# Patient Record
Sex: FEMALE | Race: WHITE | NOT HISPANIC OR LATINO | Employment: FULL TIME | URBAN - NONMETROPOLITAN AREA
[De-identification: names, ages, dates, MRNs, and addresses within clinical notes are randomized per-mention and may not be internally consistent; named-entity substitution may affect disease eponyms.]

---

## 2017-05-15 ENCOUNTER — OFFICE VISIT (OUTPATIENT)
Dept: OBSTETRICS AND GYNECOLOGY | Facility: CLINIC | Age: 37
End: 2017-05-15

## 2017-05-15 VITALS
BODY MASS INDEX: 21.33 KG/M2 | WEIGHT: 128 LBS | SYSTOLIC BLOOD PRESSURE: 120 MMHG | DIASTOLIC BLOOD PRESSURE: 72 MMHG | HEIGHT: 65 IN

## 2017-05-15 DIAGNOSIS — Z30.9 ENCOUNTER FOR CONTRACEPTIVE MANAGEMENT, UNSPECIFIED CONTRACEPTIVE ENCOUNTER TYPE: ICD-10-CM

## 2017-05-15 DIAGNOSIS — Z01.419 WELL WOMAN EXAM WITH ROUTINE GYNECOLOGICAL EXAM: Primary | ICD-10-CM

## 2017-05-15 PROCEDURE — G0123 SCREEN CERV/VAG THIN LAYER: HCPCS | Performed by: NURSE PRACTITIONER

## 2017-05-15 PROCEDURE — 99395 PREV VISIT EST AGE 18-39: CPT | Performed by: NURSE PRACTITIONER

## 2017-05-15 RX ORDER — BUTALBITAL, ACETAMINOPHEN AND CAFFEINE 300; 40; 50 MG/1; MG/1; MG/1
1 CAPSULE ORAL EVERY 4 HOURS PRN
COMMUNITY
End: 2018-07-11

## 2017-05-15 RX ORDER — TOPIRAMATE 100 MG/1
100 TABLET, FILM COATED ORAL 2 TIMES DAILY
COMMUNITY
End: 2018-07-11 | Stop reason: SDUPTHER

## 2017-05-22 LAB
GEN CATEG CVX/VAG CYTO-IMP: NORMAL
LAB AP CASE REPORT: NORMAL
LAB AP GYN ADDITIONAL INFORMATION: NORMAL
LAB AP GYN OTHER FINDINGS: NORMAL
Lab: NORMAL
PATH INTERP SPEC-IMP: NORMAL
STAT OF ADQ CVX/VAG CYTO-IMP: NORMAL

## 2017-07-18 ENCOUNTER — TELEPHONE (OUTPATIENT)
Dept: OBGYN | Age: 37
End: 2017-07-18

## 2017-07-19 ENCOUNTER — OFFICE VISIT (OUTPATIENT)
Dept: OBGYN | Age: 37
End: 2017-07-19
Payer: COMMERCIAL

## 2017-07-19 ENCOUNTER — TELEPHONE (OUTPATIENT)
Dept: OBGYN | Age: 37
End: 2017-07-19

## 2017-07-19 VITALS
BODY MASS INDEX: 22.98 KG/M2 | WEIGHT: 143 LBS | SYSTOLIC BLOOD PRESSURE: 119 MMHG | DIASTOLIC BLOOD PRESSURE: 77 MMHG | HEIGHT: 66 IN | HEART RATE: 79 BPM

## 2017-07-19 DIAGNOSIS — N91.2 AMENORRHEA: ICD-10-CM

## 2017-07-19 DIAGNOSIS — N91.2 AMENORRHEA: Primary | ICD-10-CM

## 2017-07-19 LAB
CONTROL: NORMAL
GONADOTROPIN, CHORIONIC (HCG) QUANT: 4 MIU/ML (ref 0–5.3)
PREGNANCY TEST URINE, POC: NEGATIVE

## 2017-07-19 PROCEDURE — 99202 OFFICE O/P NEW SF 15 MIN: CPT | Performed by: NURSE PRACTITIONER

## 2017-07-19 PROCEDURE — 81025 URINE PREGNANCY TEST: CPT | Performed by: NURSE PRACTITIONER

## 2017-07-19 ASSESSMENT — ENCOUNTER SYMPTOMS
GASTROINTESTINAL NEGATIVE: 1
RESPIRATORY NEGATIVE: 1
EYES NEGATIVE: 1

## 2017-08-31 DIAGNOSIS — Z30.9 ENCOUNTER FOR CONTRACEPTIVE MANAGEMENT, UNSPECIFIED CONTRACEPTIVE ENCOUNTER TYPE: ICD-10-CM

## 2017-11-08 ENCOUNTER — OFFICE VISIT (OUTPATIENT)
Dept: OBSTETRICS AND GYNECOLOGY | Facility: CLINIC | Age: 37
End: 2017-11-08

## 2017-11-08 VITALS
BODY MASS INDEX: 22.49 KG/M2 | DIASTOLIC BLOOD PRESSURE: 76 MMHG | WEIGHT: 135 LBS | HEIGHT: 65 IN | SYSTOLIC BLOOD PRESSURE: 128 MMHG

## 2017-11-08 DIAGNOSIS — N63.20 BREAST MASS, LEFT: ICD-10-CM

## 2017-11-08 DIAGNOSIS — R53.83 OTHER FATIGUE: Primary | ICD-10-CM

## 2017-11-08 DIAGNOSIS — N60.12 FIBROCYSTIC DISEASE OF LEFT BREAST: ICD-10-CM

## 2017-11-08 PROCEDURE — 99213 OFFICE O/P EST LOW 20 MIN: CPT | Performed by: NURSE PRACTITIONER

## 2017-11-08 NOTE — PROGRESS NOTES
Katt Escalona is a 36 y.o.      Chief Complaint   Patient presents with   • Breast Mass     Patient here today with c/o lump in her left breast. Patient noticed it a couple weeks ago. Patient states it is tender.            HPI -Patient found tender lump in her breast 2 weeks ago. Her LMP was about 2 weeks ago and she is taking LOLOESTRIN.  She has history of extremely fibrocystic breast.  Her 2 mat. GM had postmenopausal breast cancer.  Her mat. GM also had ovarian cancer.   She is now 37.  She does c/o fatigue and being tired, she is active as she works and has a 10 year old daughter.      The following portions of the patient's history were reviewed and updated as appropriate:vital signs, allergies, current medications, past family history, past medical history, past social history, past surgical history and problem list.      Current Outpatient Prescriptions:   •  butalbital-acetaminophen-caffeine (FIORICET) -40 MG capsule capsule, Take 1 capsule by mouth Every 4 (Four) Hours As Needed., Disp: , Rfl:   •  Norethin-Eth Estrad-Fe Biphas (LO LOESTRIN FE) 1 MG-10 MCG / 10 MCG tablet, Take 1 tablet by mouth Daily., Disp: 28 tablet, Rfl: 2  •  topiramate (TOPAMAX) 100 MG tablet, Take 100 mg by mouth 2 (Two) Times a Day., Disp: , Rfl:     Review of Systems   Constitutional: Negative for activity change and diaphoresis.   HENT: Negative for congestion, ear pain and rhinorrhea.    Eyes: Negative for redness and itching.   Respiratory: Negative for choking and shortness of breath.    Gastrointestinal: Negative for abdominal distention and anal bleeding.   Endocrine: Negative for heat intolerance and polyphagia.   Genitourinary: Negative for dyspareunia, flank pain and pelvic pain.   Musculoskeletal: Negative for arthralgias, joint swelling and neck pain.   Neurological: Negative for dizziness and light-headedness.   Psychiatric/Behavioral: Negative for agitation and confusion.     Breast ROS:  C/o breast  "lump left      Objective      /76  Ht 65\" (165.1 cm)  Wt 135 lb (61.2 kg)  LMP 10/30/2017  BMI 22.47 kg/m2      Physical Exam   Constitutional: She is oriented to person, place, and time. She appears well-developed and well-nourished.   HENT:   Head: Atraumatic.   Eyes: Right eye exhibits no discharge. Left eye exhibits no discharge.   Pulmonary/Chest: Effort normal. Right breast exhibits no inverted nipple, no mass and no nipple discharge. Left breast exhibits mass. Left breast exhibits no inverted nipple and no nipple discharge.       Bilateral fibrocystic breasts  1-2 cm isolated nodulkar area in the upper out quadrant found by patient and is found by me as well   Abdominal: Soft.   Neurological: She is alert and oriented to person, place, and time.   Psychiatric: She has a normal mood and affect. Her behavior is normal.   Nursing note and vitals reviewed.       Assessment/Plan     ASSESSMENT/PLAN    Katt was seen today for breast mass.      disease of left breast      Katt was seen today for breast mass.    Diagnoses and all orders for this visit:    Other fatigue  -     CBC & Differential  -     TSH  -     T3, Free  -     T4    Breast mass, left  -     Mammo Diagnostic Left With CAD; Future    Fibrocystic disease of left breast  -     Mammo Diagnostic Left With CAD; Future  -     US breast left limited; Future    Patient is to start EPO 1000 bid and RTO 4 weeks pending the results of the mammogram and US left breast            Skyla Rosales, APRN  11/8/2017           "

## 2017-11-09 ENCOUNTER — TELEPHONE (OUTPATIENT)
Dept: OBSTETRICS AND GYNECOLOGY | Facility: CLINIC | Age: 37
End: 2017-11-09

## 2017-11-09 LAB
BASOPHILS # BLD AUTO: 0.01 10*3/MM3 (ref 0–0.2)
BASOPHILS NFR BLD AUTO: 0.2 % (ref 0–2)
EOSINOPHIL # BLD AUTO: 0.01 10*3/MM3 (ref 0–0.7)
EOSINOPHIL NFR BLD AUTO: 0.2 % (ref 0–4)
ERYTHROCYTE [DISTWIDTH] IN BLOOD BY AUTOMATED COUNT: 12.9 % (ref 12–15)
HCT VFR BLD AUTO: 44.3 % (ref 37–47)
HGB BLD-MCNC: 14.4 G/DL (ref 12–16)
IMM GRANULOCYTES # BLD: 0.01 10*3/MM3 (ref 0–0.03)
IMM GRANULOCYTES NFR BLD: 0.2 % (ref 0–5)
LYMPHOCYTES # BLD AUTO: 1.25 10*3/MM3 (ref 0.72–4.86)
LYMPHOCYTES NFR BLD AUTO: 19.7 % (ref 15–45)
MCH RBC QN AUTO: 30 PG (ref 28–32)
MCHC RBC AUTO-ENTMCNC: 32.5 G/DL (ref 33–36)
MCV RBC AUTO: 92.3 FL (ref 82–98)
MONOCYTES # BLD AUTO: 0.37 10*3/MM3 (ref 0.19–1.3)
MONOCYTES NFR BLD AUTO: 5.8 % (ref 4–12)
NEUTROPHILS # BLD AUTO: 4.68 10*3/MM3 (ref 1.87–8.4)
NEUTROPHILS NFR BLD AUTO: 73.9 % (ref 39–78)
PLATELET # BLD AUTO: 148 10*3/MM3 (ref 130–400)
RBC # BLD AUTO: 4.8 10*6/MM3 (ref 4.2–5.4)
T3FREE SERPL-MCNC: 3.4 PG/ML (ref 2–4.4)
T4 SERPL-MCNC: 9.4 UG/DL (ref 4.5–12)
TSH SERPL DL<=0.005 MIU/L-ACNC: 3.66 MIU/ML (ref 0.47–4.68)
WBC # BLD AUTO: 6.33 10*3/MM3 (ref 4.8–10.8)

## 2017-11-14 ENCOUNTER — APPOINTMENT (OUTPATIENT)
Dept: MAMMOGRAPHY | Facility: HOSPITAL | Age: 37
End: 2017-11-14

## 2017-11-14 ENCOUNTER — APPOINTMENT (OUTPATIENT)
Dept: ULTRASOUND IMAGING | Facility: HOSPITAL | Age: 37
End: 2017-11-14

## 2018-05-04 ENCOUNTER — HOSPITAL ENCOUNTER (EMERGENCY)
Age: 38
Discharge: HOME | End: 2018-05-04
Attending: EMERGENCY MEDICINE
Payer: MEDICAID

## 2018-05-04 ENCOUNTER — APPOINTMENT (OUTPATIENT)
Dept: CT IMAGING | Age: 38
End: 2018-05-04
Payer: MEDICAID

## 2018-05-04 VITALS
TEMPERATURE: 98.3 F | WEIGHT: 130 LBS | RESPIRATION RATE: 18 BRPM | HEIGHT: 66 IN | OXYGEN SATURATION: 95 % | HEART RATE: 76 BPM | DIASTOLIC BLOOD PRESSURE: 67 MMHG | BODY MASS INDEX: 20.89 KG/M2 | SYSTOLIC BLOOD PRESSURE: 111 MMHG

## 2018-05-04 DIAGNOSIS — K29.00 ACUTE GASTRITIS WITHOUT HEMORRHAGE, UNSPECIFIED GASTRITIS TYPE: ICD-10-CM

## 2018-05-04 DIAGNOSIS — R11.2 NON-INTRACTABLE VOMITING WITH NAUSEA, UNSPECIFIED VOMITING TYPE: ICD-10-CM

## 2018-05-04 DIAGNOSIS — R10.11 ABDOMINAL PAIN, RIGHT UPPER QUADRANT: Primary | ICD-10-CM

## 2018-05-04 LAB
ALBUMIN SERPL-MCNC: 4.2 G/DL (ref 3.5–5.2)
ALP BLD-CCNC: 31 U/L (ref 35–104)
ALT SERPL-CCNC: 12 U/L (ref 5–33)
AMYLASE: 66 U/L (ref 28–100)
ANION GAP SERPL CALCULATED.3IONS-SCNC: 12 MMOL/L (ref 7–19)
AST SERPL-CCNC: 10 U/L (ref 5–32)
BASOPHILS ABSOLUTE: 0 K/UL (ref 0–0.2)
BASOPHILS RELATIVE PERCENT: 0.4 % (ref 0–1)
BILIRUB SERPL-MCNC: 0.4 MG/DL (ref 0.2–1.2)
BILIRUBIN URINE: NEGATIVE
BLOOD, URINE: NEGATIVE
BUN BLDV-MCNC: 11 MG/DL (ref 6–20)
CALCIUM SERPL-MCNC: 8.5 MG/DL (ref 8.6–10)
CHLORIDE BLD-SCNC: 109 MMOL/L (ref 98–111)
CLARITY: CLEAR
CO2: 19 MMOL/L (ref 22–29)
COLOR: YELLOW
CREAT SERPL-MCNC: 0.7 MG/DL (ref 0.5–0.9)
EOSINOPHILS ABSOLUTE: 0 K/UL (ref 0–0.6)
EOSINOPHILS RELATIVE PERCENT: 0.6 % (ref 0–5)
GFR NON-AFRICAN AMERICAN: >60
GLUCOSE BLD-MCNC: 102 MG/DL (ref 74–109)
GLUCOSE URINE: NEGATIVE MG/DL
HCG QUALITATIVE: NEGATIVE
HCT VFR BLD CALC: 44.5 % (ref 37–47)
HEMOGLOBIN: 14.2 G/DL (ref 12–16)
KETONES, URINE: NEGATIVE MG/DL
LEUKOCYTE ESTERASE, URINE: NEGATIVE
LIPASE: 48 U/L (ref 13–60)
LYMPHOCYTES ABSOLUTE: 2.4 K/UL (ref 1.1–4.5)
LYMPHOCYTES RELATIVE PERCENT: 34.9 % (ref 20–40)
MAGNESIUM: 2.1 MG/DL (ref 1.6–2.6)
MCH RBC QN AUTO: 29.8 PG (ref 27–31)
MCHC RBC AUTO-ENTMCNC: 31.9 G/DL (ref 33–37)
MCV RBC AUTO: 93.5 FL (ref 81–99)
MONOCYTES ABSOLUTE: 0.4 K/UL (ref 0–0.9)
MONOCYTES RELATIVE PERCENT: 6.1 % (ref 0–10)
NEUTROPHILS ABSOLUTE: 4 K/UL (ref 1.5–7.5)
NEUTROPHILS RELATIVE PERCENT: 57.7 % (ref 50–65)
NITRITE, URINE: NEGATIVE
PDW BLD-RTO: 12 % (ref 11.5–14.5)
PH UA: 7
PLATELET # BLD: 158 K/UL (ref 130–400)
PMV BLD AUTO: 10.2 FL (ref 9.4–12.3)
POTASSIUM REFLEX MAGNESIUM: 3.2 MMOL/L (ref 3.5–5)
PROTEIN UA: NEGATIVE MG/DL
RBC # BLD: 4.76 M/UL (ref 4.2–5.4)
SODIUM BLD-SCNC: 140 MMOL/L (ref 136–145)
SPECIFIC GRAVITY UA: >1.045
TOTAL PROTEIN: 6.7 G/DL (ref 6.6–8.7)
URINE REFLEX TO CULTURE: NORMAL
UROBILINOGEN, URINE: 0.2 E.U./DL
WBC # BLD: 6.9 K/UL (ref 4.8–10.8)

## 2018-05-04 PROCEDURE — 96374 THER/PROPH/DIAG INJ IV PUSH: CPT

## 2018-05-04 PROCEDURE — 99284 EMERGENCY DEPT VISIT MOD MDM: CPT | Performed by: EMERGENCY MEDICINE

## 2018-05-04 PROCEDURE — 2580000003 HC RX 258: Performed by: EMERGENCY MEDICINE

## 2018-05-04 PROCEDURE — 99284 EMERGENCY DEPT VISIT MOD MDM: CPT

## 2018-05-04 PROCEDURE — 74177 CT ABD & PELVIS W/CONTRAST: CPT

## 2018-05-04 PROCEDURE — 83735 ASSAY OF MAGNESIUM: CPT

## 2018-05-04 PROCEDURE — 2500000003 HC RX 250 WO HCPCS: Performed by: EMERGENCY MEDICINE

## 2018-05-04 PROCEDURE — 80053 COMPREHEN METABOLIC PANEL: CPT

## 2018-05-04 PROCEDURE — 85025 COMPLETE CBC W/AUTO DIFF WBC: CPT

## 2018-05-04 PROCEDURE — 6370000000 HC RX 637 (ALT 250 FOR IP): Performed by: EMERGENCY MEDICINE

## 2018-05-04 PROCEDURE — 82150 ASSAY OF AMYLASE: CPT

## 2018-05-04 PROCEDURE — 6360000004 HC RX CONTRAST MEDICATION: Performed by: EMERGENCY MEDICINE

## 2018-05-04 PROCEDURE — 36415 COLL VENOUS BLD VENIPUNCTURE: CPT

## 2018-05-04 PROCEDURE — 84703 CHORIONIC GONADOTROPIN ASSAY: CPT

## 2018-05-04 PROCEDURE — 83690 ASSAY OF LIPASE: CPT

## 2018-05-04 PROCEDURE — S0028 INJECTION, FAMOTIDINE, 20 MG: HCPCS | Performed by: EMERGENCY MEDICINE

## 2018-05-04 PROCEDURE — 81003 URINALYSIS AUTO W/O SCOPE: CPT

## 2018-05-04 RX ORDER — 0.9 % SODIUM CHLORIDE 0.9 %
1000 INTRAVENOUS SOLUTION INTRAVENOUS ONCE
Status: COMPLETED | OUTPATIENT
Start: 2018-05-04 | End: 2018-05-04

## 2018-05-04 RX ORDER — NORETHINDRONE ACETATE AND ETHINYL ESTRADIOL 1MG-20(21)
1 KIT ORAL DAILY
COMMUNITY
End: 2019-10-15

## 2018-05-04 RX ORDER — SUCRALFATE 1 G/1
1 TABLET ORAL 4 TIMES DAILY
Qty: 40 TABLET | Refills: 0 | Status: ON HOLD | OUTPATIENT
Start: 2018-05-04 | End: 2018-07-23

## 2018-05-04 RX ORDER — OMEPRAZOLE 20 MG/1
20 CAPSULE, DELAYED RELEASE ORAL DAILY
Status: ON HOLD | COMMUNITY
End: 2018-07-23

## 2018-05-04 RX ORDER — ONDANSETRON 4 MG/1
4 TABLET, ORALLY DISINTEGRATING ORAL EVERY 8 HOURS PRN
COMMUNITY
End: 2020-11-16

## 2018-05-04 RX ADMIN — IOPAMIDOL 90 ML: 755 INJECTION, SOLUTION INTRAVENOUS at 04:26

## 2018-05-04 RX ADMIN — SODIUM CHLORIDE 1000 ML: 9 INJECTION, SOLUTION INTRAVENOUS at 03:58

## 2018-05-04 RX ADMIN — FAMOTIDINE 40 MG: 10 INJECTION INTRAVENOUS at 03:58

## 2018-05-04 RX ADMIN — Medication 30 ML: at 03:59

## 2018-05-04 ASSESSMENT — ENCOUNTER SYMPTOMS
EYE DISCHARGE: 0
NAUSEA: 1
CONSTIPATION: 0
VOICE CHANGE: 0
BLOOD IN STOOL: 0
CHOKING: 0
ABDOMINAL PAIN: 1
DIARRHEA: 1
APNEA: 0
SORE THROAT: 0
VOMITING: 1
SHORTNESS OF BREATH: 0
FACIAL SWELLING: 0
SINUS PRESSURE: 0

## 2018-05-04 ASSESSMENT — PAIN SCALES - GENERAL: PAINLEVEL_OUTOF10: 8

## 2018-06-05 ENCOUNTER — OFFICE VISIT (OUTPATIENT)
Dept: GASTROENTEROLOGY | Age: 38
End: 2018-06-05
Payer: MEDICAID

## 2018-06-05 VITALS
BODY MASS INDEX: 21.47 KG/M2 | OXYGEN SATURATION: 97 % | HEART RATE: 83 BPM | SYSTOLIC BLOOD PRESSURE: 110 MMHG | DIASTOLIC BLOOD PRESSURE: 70 MMHG | HEIGHT: 66 IN | WEIGHT: 133.6 LBS

## 2018-06-05 DIAGNOSIS — K52.9 POSTPRANDIAL DIARRHEA: Primary | ICD-10-CM

## 2018-06-05 DIAGNOSIS — R10.13 CHRONIC EPIGASTRIC PAIN: ICD-10-CM

## 2018-06-05 DIAGNOSIS — G89.29 CHRONIC RUQ PAIN: ICD-10-CM

## 2018-06-05 DIAGNOSIS — G89.29 CHRONIC EPIGASTRIC PAIN: ICD-10-CM

## 2018-06-05 DIAGNOSIS — R10.11 CHRONIC RUQ PAIN: ICD-10-CM

## 2018-06-05 DIAGNOSIS — R11.2 NAUSEA AND VOMITING, INTRACTABILITY OF VOMITING NOT SPECIFIED, UNSPECIFIED VOMITING TYPE: ICD-10-CM

## 2018-06-05 PROCEDURE — G8427 DOCREV CUR MEDS BY ELIG CLIN: HCPCS | Performed by: NURSE PRACTITIONER

## 2018-06-05 PROCEDURE — 1036F TOBACCO NON-USER: CPT | Performed by: NURSE PRACTITIONER

## 2018-06-05 PROCEDURE — G8420 CALC BMI NORM PARAMETERS: HCPCS | Performed by: NURSE PRACTITIONER

## 2018-06-05 PROCEDURE — 99203 OFFICE O/P NEW LOW 30 MIN: CPT | Performed by: NURSE PRACTITIONER

## 2018-06-05 ASSESSMENT — ENCOUNTER SYMPTOMS
DIARRHEA: 1
NAUSEA: 1
VOMITING: 1
BACK PAIN: 0
VOICE CHANGE: 0
RECTAL PAIN: 0
CONSTIPATION: 0
ABDOMINAL PAIN: 1
SORE THROAT: 0
TROUBLE SWALLOWING: 0
ABDOMINAL DISTENTION: 0
COUGH: 0
ANAL BLEEDING: 0
SHORTNESS OF BREATH: 0
BLOOD IN STOOL: 0

## 2018-06-12 ENCOUNTER — DOCUMENTATION (OUTPATIENT)
Dept: OBSTETRICS AND GYNECOLOGY | Facility: CLINIC | Age: 38
End: 2018-06-12

## 2018-06-12 ENCOUNTER — PRIOR AUTHORIZATION (OUTPATIENT)
Dept: OBSTETRICS AND GYNECOLOGY | Facility: CLINIC | Age: 38
End: 2018-06-12

## 2018-06-12 NOTE — TELEPHONE ENCOUNTER
I received a PA request on this patient's RX Lo Loestrin FE medication. When looking through the patient's chart to see if she had previously been on any other OCP's I noticed on her 05/2017 visit with you, you had documented that the patient would have to stop taking her OCP in Nov. Of 2017 due to her being 37, a smoker, and having a clotting disorder.    In your notes in May it shows that the patient quit smoking in May of 2017 and she continued her Lo Loestrin FE. Before I began her PA I wanted to be sure that she was okay to continue her Lo Loestrin FE. If so, do you know of any hx OCP she has tried/failed.      Thanks.

## 2018-06-12 NOTE — TELEPHONE ENCOUNTER
Left 2 packs at the front of [facility], she has annual exam appointment.  HARMONY Gutierrez

## 2018-06-12 NOTE — PROGRESS NOTES
Patient stopped smoking according to her and I gave her 2 packs (left at front) for Lo lo estrin  Until her annual in July.  Skyla Rosales APRN

## 2018-06-28 NOTE — TELEPHONE ENCOUNTER
Called [facility] to initiate expendited RX PA and spoke to a rep named Keena. She said she would fill out the request then fax it to me to finish. I asked if she needed to know all the meds the patient had previously tried/failed and she said she did not but for me to fill it out when I received the forms.    Upon checking the fax, I notice that instead of a PA request being sent a DENIAL for Lo Loestrin FE had already been submitted and denied because of the rep. I contacted [facility] and spoke with Hasmukh would said there was nothing they could do expect appeal. Ref#[id number].    Spoke with patient and gave her 2 samples of Lo Loestrin FE after shipment made to our office this afternoon.     Lot# [id number]  09/19 expiration date (given to Thalia to enter in chart)     Appeal sent to [facility] as urgent.

## 2018-06-28 NOTE — TELEPHONE ENCOUNTER
Patient came in to  samples of Lo Loestrin FE but they were unfortunately not upfront upon arrival. I spoke with the patient and told her that I would get her Lo Loestrin FE approved because had previously tried/failed several OCP such as:    Junel  Microgenstin  Sprintec  Ortho-Tri-Cyclen  BC Patches

## 2018-07-11 ENCOUNTER — OFFICE VISIT (OUTPATIENT)
Dept: OBSTETRICS AND GYNECOLOGY | Facility: CLINIC | Age: 38
End: 2018-07-11

## 2018-07-11 VITALS
SYSTOLIC BLOOD PRESSURE: 120 MMHG | DIASTOLIC BLOOD PRESSURE: 70 MMHG | HEIGHT: 66 IN | BODY MASS INDEX: 21.38 KG/M2 | WEIGHT: 133 LBS

## 2018-07-11 DIAGNOSIS — Z86.69 HISTORY OF MIGRAINE HEADACHES: ICD-10-CM

## 2018-07-11 DIAGNOSIS — Z01.419 WELL WOMAN EXAM WITH ROUTINE GYNECOLOGICAL EXAM: Primary | ICD-10-CM

## 2018-07-11 DIAGNOSIS — Z30.8 ENCOUNTER FOR OTHER CONTRACEPTIVE MANAGEMENT: ICD-10-CM

## 2018-07-11 PROBLEM — R10.13 CHRONIC EPIGASTRIC PAIN: Status: ACTIVE | Noted: 2018-06-05

## 2018-07-11 PROBLEM — G89.29 CHRONIC EPIGASTRIC PAIN: Status: ACTIVE | Noted: 2018-06-05

## 2018-07-11 PROBLEM — K52.9 POSTPRANDIAL DIARRHEA: Status: ACTIVE | Noted: 2018-06-05

## 2018-07-11 PROBLEM — G89.29 CHRONIC RUQ PAIN: Status: ACTIVE | Noted: 2018-06-05

## 2018-07-11 PROBLEM — R10.11 CHRONIC RUQ PAIN: Status: ACTIVE | Noted: 2018-06-05

## 2018-07-11 PROBLEM — R11.2 NAUSEA AND VOMITING: Status: ACTIVE | Noted: 2018-06-05

## 2018-07-11 PROCEDURE — G0123 SCREEN CERV/VAG THIN LAYER: HCPCS | Performed by: NURSE PRACTITIONER

## 2018-07-11 PROCEDURE — 87624 HPV HI-RISK TYP POOLED RSLT: CPT | Performed by: NURSE PRACTITIONER

## 2018-07-11 PROCEDURE — 99395 PREV VISIT EST AGE 18-39: CPT | Performed by: NURSE PRACTITIONER

## 2018-07-11 RX ORDER — NORETHINDRONE ACETATE AND ETHINYL ESTRADIOL 1MG-20(21)
KIT ORAL
COMMUNITY
End: 2018-07-11

## 2018-07-11 NOTE — PROGRESS NOTES
"      Katt Escalona is a 37 y.o.      Chief Complaint   Patient presents with   • Gynecologic Exam     Pt is here for annual exam PT is doing well no c/o            HPI -Katt is in for her annual exam.  She takes BCP.    She has no gyn complaints.  She takes Topamax for her headaches.  She is aware that Topamax can decrease if effectiveness of BCP  And that for protection against pregnancy, she would need to use condoms as well.  She states the high dose pills make her sick.  She has a genetic clotting disorder. She quit smoking last year.      The following portions of the patient's history were reviewed and updated as appropriate:vital signs, allergies, current medications, past family history, past medical history, past social history, past surgical history and problem list.      Current Outpatient Prescriptions:   •  Norethin-Eth Estrad-Fe Biphas (LO LOESTRIN FE) 1 MG-10 MCG / 10 MCG tablet, Take 1 tablet by mouth Daily., Disp: 28 tablet, Rfl: 1      Review of Systems   Constitutional: Negative for activity change and diaphoresis.   HENT: Negative for congestion, ear pain and rhinorrhea.    Eyes: Negative for redness and itching.   Respiratory: Negative for choking and shortness of breath.    Gastrointestinal: Negative for abdominal distention and anal bleeding.   Endocrine: Negative for heat intolerance and polyphagia.   Genitourinary: Negative for dyspareunia (takes topamax 200 daily), flank pain and pelvic pain.   Musculoskeletal: Negative for arthralgias, joint swelling and neck pain.   Neurological: Positive for headaches. Negative for dizziness and light-headedness.   Hematological:        Clotting disorder  Genetic  Bleeds more freely     See. Dr. Lewis.   Psychiatric/Behavioral: Negative for agitation and confusion.     Breast ROS: negative    Objective      /70 (BP Location: Right arm, Patient Position: Sitting, Cuff Size: Adult)   Ht 167.6 cm (66\")   Wt 60.3 kg (133 lb)   BMI 21.47 " kg/m²       Physical Exam   Constitutional: She is oriented to person, place, and time. She appears well-developed and well-nourished.   HENT:   Head: Normocephalic and atraumatic.   Eyes: Right eye exhibits no discharge. Left eye exhibits no discharge.   Neck: No thyromegaly present.   Cardiovascular: Normal rate and regular rhythm.    Pulmonary/Chest: Effort normal and breath sounds normal. Right breast exhibits no inverted nipple, no mass and no nipple discharge. Left breast exhibits no inverted nipple, no mass and no nipple discharge.   Abdominal: Soft. She exhibits no distension. There is no tenderness.   Genitourinary: Rectal exam shows no mass. There is no rash, tenderness or lesion on the right labia. There is no rash, tenderness or lesion on the left labia. Uterus is not deviated, not enlarged, not fixed and not tender. Cervix exhibits no motion tenderness, no discharge and no friability. Right adnexum displays no mass and no tenderness. Left adnexum displays no mass and no tenderness. No erythema, tenderness or bleeding in the vagina. No foreign body in the vagina. No signs of injury around the vagina. No vaginal discharge found.   Musculoskeletal: She exhibits no edema or deformity.   Neurological: She is alert and oriented to person, place, and time. No cranial nerve deficit. Coordination normal.   Skin: Skin is warm and dry.   Psychiatric: She has a normal mood and affect.   Nursing note and vitals reviewed.       Assessment/Plan               Katt was seen today for gynecologic exam.    Diagnoses and all orders for this visit:    Well woman exam with routine gynecological exam  -     Liquid-based Pap Smear, Screening    Encounter for other contraceptive management    History of migraine headaches  Managed by Dr. Cuellar, patient takes Topamax daily 200 mgm and is aware that they can decrease the effectiveness of BCP.  She states that higher dose pills cause her nausea and she knows if she becomes  sexually active, she will need to use condoms.  She is aware that there are other methods available.      BMI 21.0-21.9, adult        History of migraine headaches  Managed by Dr. Cuellar.          Patient is counseled re: BCP use, risks/benifits, and side effects.              Skyla Rosales, APRN  7/11/2018

## 2018-07-19 LAB
GEN CATEG CVX/VAG CYTO-IMP: ABNORMAL
LAB AP CASE REPORT: ABNORMAL
LAB AP GYN ADDITIONAL INFORMATION: ABNORMAL
PATH INTERP SPEC-IMP: ABNORMAL
STAT OF ADQ CVX/VAG CYTO-IMP: ABNORMAL

## 2018-07-20 ENCOUNTER — TELEPHONE (OUTPATIENT)
Dept: OBSTETRICS AND GYNECOLOGY | Facility: CLINIC | Age: 38
End: 2018-07-20

## 2018-07-20 NOTE — TELEPHONE ENCOUNTER
----- Message from HARMONY Gutierrez sent at 7/20/2018  9:16 AM CDT -----  Tell pap ok  HARMONY Gutierrez  __Pt informed pap normal.

## 2018-07-23 ENCOUNTER — ANESTHESIA EVENT (OUTPATIENT)
Dept: ENDOSCOPY | Age: 38
End: 2018-07-23
Payer: MEDICAID

## 2018-07-23 ENCOUNTER — TELEPHONE (OUTPATIENT)
Dept: GASTROENTEROLOGY | Age: 38
End: 2018-07-23

## 2018-07-23 ENCOUNTER — ANESTHESIA (OUTPATIENT)
Dept: ENDOSCOPY | Age: 38
End: 2018-07-23
Payer: MEDICAID

## 2018-07-23 ENCOUNTER — HOSPITAL ENCOUNTER (OUTPATIENT)
Age: 38
Setting detail: OUTPATIENT SURGERY
Discharge: HOME | End: 2018-07-23
Attending: INTERNAL MEDICINE | Admitting: INTERNAL MEDICINE
Payer: MEDICAID

## 2018-07-23 VITALS
WEIGHT: 128 LBS | HEIGHT: 67 IN | DIASTOLIC BLOOD PRESSURE: 80 MMHG | SYSTOLIC BLOOD PRESSURE: 100 MMHG | OXYGEN SATURATION: 100 % | RESPIRATION RATE: 16 BRPM | HEART RATE: 73 BPM | BODY MASS INDEX: 20.09 KG/M2 | TEMPERATURE: 97.4 F

## 2018-07-23 VITALS
SYSTOLIC BLOOD PRESSURE: 92 MMHG | OXYGEN SATURATION: 99 % | DIASTOLIC BLOOD PRESSURE: 58 MMHG | RESPIRATION RATE: 33 BRPM

## 2018-07-23 PROBLEM — K21.9 GERD (GASTROESOPHAGEAL REFLUX DISEASE): Status: ACTIVE | Noted: 2018-07-23

## 2018-07-23 LAB — HCG(URINE) PREGNANCY TEST: NEGATIVE

## 2018-07-23 PROCEDURE — 2500000003 HC RX 250 WO HCPCS: Performed by: NURSE ANESTHETIST, CERTIFIED REGISTERED

## 2018-07-23 PROCEDURE — 2580000003 HC RX 258: Performed by: INTERNAL MEDICINE

## 2018-07-23 PROCEDURE — 45378 DIAGNOSTIC COLONOSCOPY: CPT | Performed by: INTERNAL MEDICINE

## 2018-07-23 PROCEDURE — 6360000002 HC RX W HCPCS: Performed by: NURSE ANESTHETIST, CERTIFIED REGISTERED

## 2018-07-23 PROCEDURE — 88305 TISSUE EXAM BY PATHOLOGIST: CPT

## 2018-07-23 PROCEDURE — 3700000000 HC ANESTHESIA ATTENDED CARE: Performed by: INTERNAL MEDICINE

## 2018-07-23 PROCEDURE — 3609012400 HC EGD TRANSORAL BIOPSY SINGLE/MULTIPLE: Performed by: INTERNAL MEDICINE

## 2018-07-23 PROCEDURE — 7100000011 HC PHASE II RECOVERY - ADDTL 15 MIN: Performed by: INTERNAL MEDICINE

## 2018-07-23 PROCEDURE — 87077 CULTURE AEROBIC IDENTIFY: CPT

## 2018-07-23 PROCEDURE — 7100000010 HC PHASE II RECOVERY - FIRST 15 MIN: Performed by: INTERNAL MEDICINE

## 2018-07-23 PROCEDURE — 3700000001 HC ADD 15 MINUTES (ANESTHESIA): Performed by: INTERNAL MEDICINE

## 2018-07-23 PROCEDURE — 2709999900 HC NON-CHARGEABLE SUPPLY: Performed by: INTERNAL MEDICINE

## 2018-07-23 PROCEDURE — 43239 EGD BIOPSY SINGLE/MULTIPLE: CPT | Performed by: INTERNAL MEDICINE

## 2018-07-23 PROCEDURE — 3609027000 HC COLONOSCOPY: Performed by: INTERNAL MEDICINE

## 2018-07-23 PROCEDURE — 2500000003 HC RX 250 WO HCPCS: Performed by: INTERNAL MEDICINE

## 2018-07-23 PROCEDURE — 81025 URINE PREGNANCY TEST: CPT

## 2018-07-23 RX ORDER — COLESEVELAM 180 1/1
625 TABLET ORAL 2 TIMES DAILY WITH MEALS
Qty: 60 TABLET | Refills: 3 | Status: SHIPPED | OUTPATIENT
Start: 2018-07-23 | End: 2019-09-18

## 2018-07-23 RX ORDER — LIDOCAINE HYDROCHLORIDE 20 MG/ML
INJECTION, SOLUTION INFILTRATION; PERINEURAL PRN
Status: DISCONTINUED | OUTPATIENT
Start: 2018-07-23 | End: 2018-07-23 | Stop reason: SDUPTHER

## 2018-07-23 RX ORDER — SODIUM CHLORIDE, SODIUM LACTATE, POTASSIUM CHLORIDE, CALCIUM CHLORIDE 600; 310; 30; 20 MG/100ML; MG/100ML; MG/100ML; MG/100ML
INJECTION, SOLUTION INTRAVENOUS CONTINUOUS
Status: DISCONTINUED | OUTPATIENT
Start: 2018-07-23 | End: 2018-07-23 | Stop reason: HOSPADM

## 2018-07-23 RX ORDER — MIDAZOLAM HYDROCHLORIDE 1 MG/ML
INJECTION INTRAMUSCULAR; INTRAVENOUS PRN
Status: DISCONTINUED | OUTPATIENT
Start: 2018-07-23 | End: 2018-07-23 | Stop reason: SDUPTHER

## 2018-07-23 RX ORDER — PROPOFOL 10 MG/ML
INJECTION, EMULSION INTRAVENOUS PRN
Status: DISCONTINUED | OUTPATIENT
Start: 2018-07-23 | End: 2018-07-23 | Stop reason: SDUPTHER

## 2018-07-23 RX ORDER — LIDOCAINE HYDROCHLORIDE 10 MG/ML
1 INJECTION, SOLUTION EPIDURAL; INFILTRATION; INTRACAUDAL; PERINEURAL ONCE
Status: COMPLETED | OUTPATIENT
Start: 2018-07-23 | End: 2018-07-23

## 2018-07-23 RX ADMIN — LIDOCAINE HYDROCHLORIDE 40 MG: 20 INJECTION, SOLUTION INFILTRATION; PERINEURAL at 09:18

## 2018-07-23 RX ADMIN — MIDAZOLAM 2 MG: 1 INJECTION INTRAMUSCULAR; INTRAVENOUS at 09:18

## 2018-07-23 RX ADMIN — LIDOCAINE HYDROCHLORIDE 1 ML: 10 INJECTION, SOLUTION EPIDURAL; INFILTRATION; INTRACAUDAL; PERINEURAL at 08:19

## 2018-07-23 RX ADMIN — SODIUM CHLORIDE, SODIUM LACTATE, POTASSIUM CHLORIDE, AND CALCIUM CHLORIDE: 600; 310; 30; 20 INJECTION, SOLUTION INTRAVENOUS at 08:19

## 2018-07-23 RX ADMIN — PROPOFOL 450 MG: 10 INJECTION, EMULSION INTRAVENOUS at 09:18

## 2018-07-23 ASSESSMENT — PAIN SCALES - GENERAL
PAINLEVEL_OUTOF10: 0

## 2018-07-23 ASSESSMENT — PAIN - FUNCTIONAL ASSESSMENT: PAIN_FUNCTIONAL_ASSESSMENT: 0-10

## 2018-07-23 NOTE — H&P
07/23/2018    Chronic epigastric pain [R10.13, G89.29] 06/05/2018    Nausea and vomiting [R11.2] 06/05/2018    Postprandial diarrhea [K52.9] 06/05/2018        All other pertinent GI symptoms negative. Physical Exam:  Cardiac:  [x]WNL  []Comments:  Pulmonary:  [x]WNL   []Comments:  Neuro/Mental Status:  [x]WNL  []Comments:  Abdominal:  [x]WNL    []Comments:  Other:   []WNL  []Comments:    Informed Consent:  The risks and benefits of the procedure have been discussed with either the patient or if they cannot consent, their representative. Assessment:  Patient examined and appropriate for planned sedation and procedure. Plan:  Proceed with planned sedation and procedure as above.     Ismael Montes DO  9:10 AM

## 2018-07-23 NOTE — ANESTHESIA PRE PROCEDURE
CHOLECYSTECTOMY      COLONOSCOPY      TONSILLECTOMY AND ADENOIDECTOMY      UPPER GASTROINTESTINAL ENDOSCOPY         Social History:    Social History   Substance Use Topics    Smoking status: Former Smoker    Smokeless tobacco: Never Used    Alcohol use No                                Counseling given: Not Answered      Vital Signs (Current): There were no vitals filed for this visit. BP Readings from Last 3 Encounters:   06/05/18 110/70   05/04/18 111/67   07/19/17 119/77       NPO Status:                                                                                 BMI:   Wt Readings from Last 3 Encounters:   06/05/18 133 lb 9.6 oz (60.6 kg)   05/04/18 130 lb (59 kg)   07/19/17 143 lb (64.9 kg)     There is no height or weight on file to calculate BMI.    CBC:   Lab Results   Component Value Date    WBC 6.9 05/04/2018    RBC 4.76 05/04/2018    HGB 14.2 05/04/2018    HCT 44.5 05/04/2018    MCV 93.5 05/04/2018    RDW 12.0 05/04/2018     05/04/2018       CMP:   Lab Results   Component Value Date     05/04/2018    K 3.2 05/04/2018     05/04/2018    CO2 19 05/04/2018    BUN 11 05/04/2018    CREATININE 0.7 05/04/2018    LABGLOM >60 05/04/2018    GLUCOSE 102 05/04/2018    PROT 6.7 05/04/2018    CALCIUM 8.5 05/04/2018    BILITOT 0.4 05/04/2018    ALKPHOS 31 05/04/2018    AST 10 05/04/2018    ALT 12 05/04/2018       POC Tests: No results for input(s): POCGLU, POCNA, POCK, POCCL, POCBUN, POCHEMO, POCHCT in the last 72 hours.     Coags:   Lab Results   Component Value Date    PROTIME 13.5 10/24/2013    INR 1.08 10/24/2013       HCG (If Applicable):   Lab Results   Component Value Date    PREGTESTUR Negative 07/23/2018        ABGs: No results found for: PHART, PO2ART, IOE4HDV, JAF2PWR, BEART, B7UMHHOC     Type & Screen (If Applicable):  No results found for: LABABO, [address]    Anesthesia Evaluation  Patient summary reviewed and Nursing notes reviewed  Airway:

## 2018-07-24 LAB — CLOTEST: NEGATIVE

## 2019-04-02 DIAGNOSIS — Z30.9 ENCOUNTER FOR CONTRACEPTIVE MANAGEMENT, UNSPECIFIED TYPE: Primary | ICD-10-CM

## 2019-04-03 ENCOUNTER — TELEPHONE (OUTPATIENT)
Dept: OBSTETRICS AND GYNECOLOGY | Facility: CLINIC | Age: 39
End: 2019-04-03

## 2019-04-08 ENCOUNTER — TELEPHONE (OUTPATIENT)
Dept: OBSTETRICS AND GYNECOLOGY | Facility: CLINIC | Age: 39
End: 2019-04-08

## 2019-04-08 NOTE — TELEPHONE ENCOUNTER
I called patient  Regarding hr prescription for Lo Loestrin.  Her [employer] states she has a separate policy for contraception. Called and left message with patient to give me that information.  bc

## 2019-08-02 DIAGNOSIS — Z30.9 ENCOUNTER FOR CONTRACEPTIVE MANAGEMENT, UNSPECIFIED TYPE: ICD-10-CM

## 2019-08-05 DIAGNOSIS — Z30.8 ENCOUNTER FOR OTHER CONTRACEPTIVE MANAGEMENT: ICD-10-CM

## 2019-08-06 DIAGNOSIS — Z30.9 ENCOUNTER FOR CONTRACEPTIVE MANAGEMENT, UNSPECIFIED TYPE: ICD-10-CM

## 2019-08-06 RX ORDER — NORETHINDRONE ACETATE AND ETHINYL ESTRADIOL, ETHINYL ESTRADIOL AND FERROUS FUMARATE 1MG-10(24)
KIT ORAL
Qty: 28 TABLET | Refills: 10 | Status: SHIPPED | OUTPATIENT
Start: 2019-08-06 | End: 2019-08-26

## 2019-08-26 DIAGNOSIS — Z30.9 ENCOUNTER FOR CONTRACEPTIVE MANAGEMENT, UNSPECIFIED TYPE: ICD-10-CM

## 2019-08-29 ENCOUNTER — TELEPHONE (OUTPATIENT)
Dept: OBSTETRICS AND GYNECOLOGY | Facility: CLINIC | Age: 39
End: 2019-08-29

## 2019-08-29 NOTE — TELEPHONE ENCOUNTER
----- Message from HARMONY Gutierrez sent at 8/26/2019  2:01 PM CDT -----  Please call Carolina and tell her that I sent in 2 month on BCP that her annual is overdue.  HARMONY Gutierrez

## 2019-09-02 DIAGNOSIS — Z30.8 ENCOUNTER FOR OTHER CONTRACEPTIVE MANAGEMENT: ICD-10-CM

## 2019-09-03 RX ORDER — NORETHINDRONE ACETATE AND ETHINYL ESTRADIOL, ETHINYL ESTRADIOL AND FERROUS FUMARATE 1MG-10(24)
KIT ORAL
Qty: 28 TABLET | Refills: 1 | Status: SHIPPED | OUTPATIENT
Start: 2019-09-03

## 2019-09-18 ENCOUNTER — OFFICE VISIT (OUTPATIENT)
Dept: PRIMARY CARE CLINIC | Age: 39
End: 2019-09-18
Payer: COMMERCIAL

## 2019-09-18 VITALS
HEART RATE: 70 BPM | DIASTOLIC BLOOD PRESSURE: 66 MMHG | TEMPERATURE: 98.2 F | RESPIRATION RATE: 20 BRPM | BODY MASS INDEX: 21.5 KG/M2 | OXYGEN SATURATION: 99 % | WEIGHT: 137 LBS | SYSTOLIC BLOOD PRESSURE: 102 MMHG | HEIGHT: 67 IN

## 2019-09-18 DIAGNOSIS — D68.9 CLOTTING DISORDER (HCC): ICD-10-CM

## 2019-09-18 DIAGNOSIS — F90.9 ATTENTION DEFICIT HYPERACTIVITY DISORDER (ADHD), UNSPECIFIED ADHD TYPE: Primary | ICD-10-CM

## 2019-09-18 DIAGNOSIS — Z00.00 WELLNESS EXAMINATION: ICD-10-CM

## 2019-09-18 PROCEDURE — 99203 OFFICE O/P NEW LOW 30 MIN: CPT | Performed by: FAMILY MEDICINE

## 2019-09-18 ASSESSMENT — PATIENT HEALTH QUESTIONNAIRE - PHQ9
1. LITTLE INTEREST OR PLEASURE IN DOING THINGS: 0
SUM OF ALL RESPONSES TO PHQ QUESTIONS 1-9: 0
SUM OF ALL RESPONSES TO PHQ QUESTIONS 1-9: 0
SUM OF ALL RESPONSES TO PHQ9 QUESTIONS 1 & 2: 0
2. FEELING DOWN, DEPRESSED OR HOPELESS: 0

## 2019-09-20 DIAGNOSIS — Z00.00 WELLNESS EXAMINATION: ICD-10-CM

## 2019-09-20 LAB
ALBUMIN SERPL-MCNC: 4.2 G/DL (ref 3.5–5.2)
ALP BLD-CCNC: 38 U/L (ref 35–104)
ALT SERPL-CCNC: 9 U/L (ref 5–33)
ANION GAP SERPL CALCULATED.3IONS-SCNC: 10 MMOL/L (ref 7–19)
AST SERPL-CCNC: 10 U/L (ref 5–32)
BASOPHILS ABSOLUTE: 0 K/UL (ref 0–0.2)
BASOPHILS RELATIVE PERCENT: 0.6 % (ref 0–1)
BILIRUB SERPL-MCNC: 0.5 MG/DL (ref 0.2–1.2)
BUN BLDV-MCNC: 15 MG/DL (ref 6–20)
CALCIUM SERPL-MCNC: 8.5 MG/DL (ref 8.6–10)
CHLORIDE BLD-SCNC: 117 MMOL/L (ref 98–111)
CHOLESTEROL, TOTAL: 154 MG/DL (ref 160–199)
CO2: 17 MMOL/L (ref 22–29)
CREAT SERPL-MCNC: 0.8 MG/DL (ref 0.5–0.9)
EOSINOPHILS ABSOLUTE: 0 K/UL (ref 0–0.6)
EOSINOPHILS RELATIVE PERCENT: 0.8 % (ref 0–5)
GFR NON-AFRICAN AMERICAN: >60
GLUCOSE BLD-MCNC: 82 MG/DL (ref 74–109)
HCT VFR BLD CALC: 45.9 % (ref 37–47)
HDLC SERPL-MCNC: 56 MG/DL (ref 65–121)
HEMOGLOBIN: 14.7 G/DL (ref 12–16)
IMMATURE GRANULOCYTES #: 0 K/UL
LDL CHOLESTEROL CALCULATED: 82 MG/DL
LYMPHOCYTES ABSOLUTE: 2.3 K/UL (ref 1.1–4.5)
LYMPHOCYTES RELATIVE PERCENT: 46.6 % (ref 20–40)
MCH RBC QN AUTO: 31.3 PG (ref 27–31)
MCHC RBC AUTO-ENTMCNC: 32 G/DL (ref 33–37)
MCV RBC AUTO: 97.7 FL (ref 81–99)
MONOCYTES ABSOLUTE: 0.4 K/UL (ref 0–0.9)
MONOCYTES RELATIVE PERCENT: 8.2 % (ref 0–10)
NEUTROPHILS ABSOLUTE: 2.2 K/UL (ref 1.5–7.5)
NEUTROPHILS RELATIVE PERCENT: 43.6 % (ref 50–65)
PDW BLD-RTO: 11.9 % (ref 11.5–14.5)
PLATELET # BLD: 142 K/UL (ref 130–400)
PMV BLD AUTO: 10.5 FL (ref 9.4–12.3)
POTASSIUM SERPL-SCNC: 3.8 MMOL/L (ref 3.5–5)
RBC # BLD: 4.7 M/UL (ref 4.2–5.4)
SODIUM BLD-SCNC: 144 MMOL/L (ref 136–145)
TOTAL PROTEIN: 6.9 G/DL (ref 6.6–8.7)
TRIGL SERPL-MCNC: 81 MG/DL (ref 0–149)
WBC # BLD: 5 K/UL (ref 4.8–10.8)

## 2019-09-26 ENCOUNTER — TELEPHONE (OUTPATIENT)
Dept: OBSTETRICS AND GYNECOLOGY | Facility: CLINIC | Age: 39
End: 2019-09-26

## 2019-09-26 NOTE — TELEPHONE ENCOUNTER
Pt calls stating she is out of BC pills and is told by pharmacy she cannot  another pac until 10/1.  She is asking for a sample pac.  Noted per our record She has a refill remaining at pharmacy.  States she started this current pack when it was sent in on 9/3/19.  Informed her if taken correctly her last pill would be 9/30 on 28 day pill pack and that she would be able to get the refill 10/1/19.  Also, she is in need of office appt before another refill can be ordered.  She states understanding and that she called office this morning and left message for scheduling to call her to make appt.

## 2019-09-27 ENCOUNTER — HOSPITAL ENCOUNTER (OUTPATIENT)
Dept: INFUSION THERAPY | Age: 39
Discharge: HOME | End: 2019-09-27
Payer: COMMERCIAL

## 2019-09-27 ENCOUNTER — OFFICE VISIT (OUTPATIENT)
Dept: HEMATOLOGY | Age: 39
End: 2019-09-27
Payer: COMMERCIAL

## 2019-09-27 VITALS
BODY MASS INDEX: 21.46 KG/M2 | HEIGHT: 67 IN | SYSTOLIC BLOOD PRESSURE: 114 MMHG | OXYGEN SATURATION: 99 % | DIASTOLIC BLOOD PRESSURE: 74 MMHG | HEART RATE: 80 BPM

## 2019-09-27 DIAGNOSIS — D69.6 THROMBOCYTOPENIA (HCC): ICD-10-CM

## 2019-09-27 DIAGNOSIS — D69.9 BLEEDING DIATHESIS (HCC): Primary | ICD-10-CM

## 2019-09-27 DIAGNOSIS — R53.83 OTHER FATIGUE: ICD-10-CM

## 2019-09-27 PROCEDURE — 36415 COLL VENOUS BLD VENIPUNCTURE: CPT

## 2019-09-27 PROCEDURE — 99205 OFFICE O/P NEW HI 60 MIN: CPT | Performed by: NURSE PRACTITIONER

## 2019-09-27 PROCEDURE — 84443 ASSAY THYROID STIM HORMONE: CPT

## 2019-09-27 PROCEDURE — 86140 C-REACTIVE PROTEIN: CPT

## 2019-09-27 PROCEDURE — 82607 VITAMIN B-12: CPT

## 2019-09-27 PROCEDURE — 85025 COMPLETE CBC W/AUTO DIFF WBC: CPT

## 2019-09-27 PROCEDURE — 80053 COMPREHEN METABOLIC PANEL: CPT

## 2019-09-27 RX ORDER — LEVOCETIRIZINE DIHYDROCHLORIDE 5 MG/1
5 TABLET, FILM COATED ORAL NIGHTLY
COMMUNITY
End: 2020-03-28

## 2019-09-29 ASSESSMENT — ENCOUNTER SYMPTOMS
WHEEZING: 0
BACK PAIN: 0
EYE DISCHARGE: 0
NAUSEA: 0
DIARRHEA: 0
VOMITING: 0
ABDOMINAL PAIN: 0
COUGH: 0
COLOR CHANGE: 0

## 2019-09-29 NOTE — PROGRESS NOTES
SUBJECTIVE:    Patient ID: Nenita Brooke is a 41 y.o. female. HPI:     Patient presents today to establish care. She states that she feels like she has problems with concentration. She states that she has been on ADHD medication in the past.  She states that she would like to go back on something because she states she feels like she is having difficulty staying on task at work. She states that she tolerated the medication well previously. She states it made a significant difference in her concentration. She does have a history of a clotting disorder. She states that she is not currently seeing hematology. She states that it has been a while since she has had fasting blood work done. She is not fasting today but will take labs to get them done. Past Medical History:   Diagnosis Date    Clotting disorder (HCC)     factor 14, 11    GERD (gastroesophageal reflux disease)     Headache      Current Outpatient Medications on File Prior to Visit   Medication Sig Dispense Refill    norethindrone-ethinyl estradiol (JUNEL FE 1/20) 1-20 MG-MCG per tablet Take 1 tablet by mouth daily      ondansetron (ZOFRAN-ODT) 4 MG disintegrating tablet Take 4 mg by mouth every 8 hours as needed for Nausea or Vomiting      topiramate (TOPAMAX) 200 MG tablet Take 200 mg by mouth 2 times daily       No current facility-administered medications on file prior to visit.       Allergies   Allergen Reactions    Oxaprozin     Tyloxapol     Norco [Hydrocodone-Acetaminophen] Rash    Percocet [Oxycodone-Acetaminophen] Rash     Past Surgical History:   Procedure Laterality Date    CHOLECYSTECTOMY      COLONOSCOPY      TX COLONOSCOPY FLX DX W/COLLJ SPEC WHEN PFRMD N/A 7/23/2018    Dr Jg Franklin, 14 yr (age 50) recall    TX EGD TRANSORAL BIOPSY SINGLE/MULTIPLE N/A 7/23/2018    Dr Kentrell Hernandez (-) Cathy, (+) Barrettt's, (-) dysplasia--1st dx--1 yr recall    TONSILLECTOMY AND ADENOIDECTOMY      UPPER GASTROINTESTINAL ENDOSCOPY

## 2019-10-08 ENCOUNTER — HOSPITAL ENCOUNTER (OUTPATIENT)
Age: 39
Setting detail: SPECIMEN
Discharge: HOME | End: 2019-10-08
Payer: COMMERCIAL

## 2019-10-08 PROCEDURE — 88175 CYTOPATH C/V AUTO FLUID REDO: CPT

## 2019-10-12 PROBLEM — D69.9 BLEEDING DIATHESIS (HCC): Status: ACTIVE | Noted: 2019-10-12

## 2019-10-12 PROBLEM — R53.83 OTHER FATIGUE: Status: ACTIVE | Noted: 2019-10-12

## 2019-10-12 PROBLEM — D69.6 THROMBOCYTOPENIA (HCC): Status: ACTIVE | Noted: 2019-10-12

## 2019-10-12 ASSESSMENT — ENCOUNTER SYMPTOMS
EYE PAIN: 0
WHEEZING: 0
EYE REDNESS: 0
SORE THROAT: 0
COUGH: 0
ABDOMINAL PAIN: 0
SHORTNESS OF BREATH: 0
RESPIRATORY NEGATIVE: 1
BLOOD IN STOOL: 0
VOMITING: 0
CONSTIPATION: 0
EYE DISCHARGE: 0
BACK PAIN: 0
DIARRHEA: 0
NAUSEA: 0
EYES NEGATIVE: 1
GASTROINTESTINAL NEGATIVE: 1

## 2019-10-15 ENCOUNTER — OFFICE VISIT (OUTPATIENT)
Dept: PRIMARY CARE CLINIC | Age: 39
End: 2019-10-15
Payer: COMMERCIAL

## 2019-10-15 VITALS
SYSTOLIC BLOOD PRESSURE: 106 MMHG | RESPIRATION RATE: 20 BRPM | OXYGEN SATURATION: 99 % | WEIGHT: 135 LBS | HEART RATE: 64 BPM | HEIGHT: 66 IN | BODY MASS INDEX: 21.69 KG/M2 | TEMPERATURE: 98.2 F | DIASTOLIC BLOOD PRESSURE: 64 MMHG

## 2019-10-15 DIAGNOSIS — D68.9 CLOTTING DISORDER (HCC): ICD-10-CM

## 2019-10-15 DIAGNOSIS — F90.9 ATTENTION DEFICIT HYPERACTIVITY DISORDER (ADHD), UNSPECIFIED ADHD TYPE: Primary | ICD-10-CM

## 2019-10-15 PROCEDURE — 99213 OFFICE O/P EST LOW 20 MIN: CPT | Performed by: FAMILY MEDICINE

## 2019-10-15 RX ORDER — DEXTROAMPHETAMINE SACCHARATE, AMPHETAMINE ASPARTATE MONOHYDRATE, DEXTROAMPHETAMINE SULFATE AND AMPHETAMINE SULFATE 3.75; 3.75; 3.75; 3.75 MG/1; MG/1; MG/1; MG/1
15 CAPSULE, EXTENDED RELEASE ORAL EVERY MORNING
Qty: 30 CAPSULE | Refills: 0 | Status: SHIPPED | OUTPATIENT
Start: 2019-10-15 | End: 2019-11-14

## 2019-10-15 ASSESSMENT — ENCOUNTER SYMPTOMS
DIARRHEA: 0
WHEEZING: 0
EYE DISCHARGE: 0
BACK PAIN: 0
NAUSEA: 0
ABDOMINAL PAIN: 0
COUGH: 0
COLOR CHANGE: 0
VOMITING: 0

## 2019-10-25 ENCOUNTER — HOSPITAL ENCOUNTER (OUTPATIENT)
Dept: INFUSION THERAPY | Age: 39
Discharge: HOME | End: 2019-10-25
Payer: COMMERCIAL

## 2019-10-25 ENCOUNTER — OFFICE VISIT (OUTPATIENT)
Dept: HEMATOLOGY | Age: 39
End: 2019-10-25
Payer: COMMERCIAL

## 2019-10-25 VITALS
SYSTOLIC BLOOD PRESSURE: 100 MMHG | HEIGHT: 66 IN | BODY MASS INDEX: 21.29 KG/M2 | DIASTOLIC BLOOD PRESSURE: 66 MMHG | HEART RATE: 102 BPM | WEIGHT: 132.5 LBS | OXYGEN SATURATION: 99 %

## 2019-10-25 DIAGNOSIS — E87.6 HYPOKALEMIA: ICD-10-CM

## 2019-10-25 DIAGNOSIS — D69.9 BLEEDING DIATHESIS (HCC): Primary | ICD-10-CM

## 2019-10-25 DIAGNOSIS — R53.83 OTHER FATIGUE: ICD-10-CM

## 2019-10-25 DIAGNOSIS — D69.6 THROMBOCYTOPENIA (HCC): ICD-10-CM

## 2019-10-25 PROCEDURE — 85025 COMPLETE CBC W/AUTO DIFF WBC: CPT

## 2019-10-25 PROCEDURE — 99212 OFFICE O/P EST SF 10 MIN: CPT | Performed by: NURSE PRACTITIONER

## 2019-10-25 PROCEDURE — 80053 COMPREHEN METABOLIC PANEL: CPT

## 2019-10-25 PROCEDURE — 36415 COLL VENOUS BLD VENIPUNCTURE: CPT

## 2019-11-14 ENCOUNTER — TELEPHONE (OUTPATIENT)
Dept: PRIMARY CARE CLINIC | Age: 39
End: 2019-11-14

## 2019-11-14 DIAGNOSIS — F90.9 ATTENTION DEFICIT HYPERACTIVITY DISORDER (ADHD), UNSPECIFIED ADHD TYPE: ICD-10-CM

## 2019-11-14 RX ORDER — DEXTROAMPHETAMINE SACCHARATE, AMPHETAMINE ASPARTATE MONOHYDRATE, DEXTROAMPHETAMINE SULFATE AND AMPHETAMINE SULFATE 5; 5; 5; 5 MG/1; MG/1; MG/1; MG/1
20 CAPSULE, EXTENDED RELEASE ORAL EVERY MORNING
Qty: 30 CAPSULE | Refills: 0 | Status: SHIPPED | OUTPATIENT
Start: 2019-11-14 | End: 2020-01-15

## 2019-11-14 RX ORDER — DEXTROAMPHETAMINE SACCHARATE, AMPHETAMINE ASPARTATE MONOHYDRATE, DEXTROAMPHETAMINE SULFATE AND AMPHETAMINE SULFATE 5; 5; 5; 5 MG/1; MG/1; MG/1; MG/1
20 CAPSULE, EXTENDED RELEASE ORAL EVERY MORNING
Qty: 30 CAPSULE | Refills: 0 | Status: SHIPPED | OUTPATIENT
Start: 2019-11-14 | End: 2019-11-14 | Stop reason: SDUPTHER

## 2019-11-20 ENCOUNTER — HOSPITAL ENCOUNTER (OUTPATIENT)
Age: 39
Setting detail: SPECIMEN
Discharge: HOME | End: 2019-11-20
Payer: COMMERCIAL

## 2019-11-20 PROCEDURE — 88305 TISSUE EXAM BY PATHOLOGIST: CPT

## 2019-11-20 PROCEDURE — 88175 CYTOPATH C/V AUTO FLUID REDO: CPT

## 2019-11-21 DIAGNOSIS — D69.6 THROMBOCYTOPENIA (HCC): Primary | ICD-10-CM

## 2019-11-22 DIAGNOSIS — E87.6 HYPOKALEMIA: ICD-10-CM

## 2019-11-22 DIAGNOSIS — D69.6 THROMBOCYTOPENIA (HCC): ICD-10-CM

## 2019-11-22 LAB
ALBUMIN SERPL-MCNC: 4.5 G/DL (ref 3.5–5.2)
ALP BLD-CCNC: 44 U/L (ref 35–104)
ALT SERPL-CCNC: 9 U/L (ref 5–33)
ANION GAP SERPL CALCULATED.3IONS-SCNC: 13 MMOL/L (ref 7–19)
AST SERPL-CCNC: 11 U/L (ref 5–32)
BASOPHILS ABSOLUTE: 0 K/UL (ref 0–0.2)
BASOPHILS RELATIVE PERCENT: 0.7 % (ref 0–1)
BILIRUB SERPL-MCNC: <0.2 MG/DL (ref 0.2–1.2)
BUN BLDV-MCNC: 10 MG/DL (ref 6–20)
CALCIUM SERPL-MCNC: 9 MG/DL (ref 8.6–10)
CHLORIDE BLD-SCNC: 115 MMOL/L (ref 98–111)
CO2: 17 MMOL/L (ref 22–29)
CREAT SERPL-MCNC: 0.8 MG/DL (ref 0.5–0.9)
EOSINOPHILS ABSOLUTE: 0 K/UL (ref 0–0.6)
EOSINOPHILS RELATIVE PERCENT: 0.5 % (ref 0–5)
GFR NON-AFRICAN AMERICAN: >60
GLUCOSE BLD-MCNC: 118 MG/DL (ref 74–109)
HCT VFR BLD CALC: 48 % (ref 37–47)
HEMOGLOBIN: 15.5 G/DL (ref 12–16)
IMMATURE GRANULOCYTES #: 0 K/UL
LYMPHOCYTES ABSOLUTE: 1.4 K/UL (ref 1.1–4.5)
LYMPHOCYTES RELATIVE PERCENT: 32.2 % (ref 20–40)
MCH RBC QN AUTO: 31.4 PG (ref 27–31)
MCHC RBC AUTO-ENTMCNC: 32.3 G/DL (ref 33–37)
MCV RBC AUTO: 97.4 FL (ref 81–99)
MONOCYTES ABSOLUTE: 0.4 K/UL (ref 0–0.9)
MONOCYTES RELATIVE PERCENT: 9 % (ref 0–10)
NEUTROPHILS ABSOLUTE: 2.4 K/UL (ref 1.5–7.5)
NEUTROPHILS RELATIVE PERCENT: 57.4 % (ref 50–65)
PDW BLD-RTO: 12.2 % (ref 11.5–14.5)
PLATELET # BLD: 144 K/UL (ref 130–400)
PMV BLD AUTO: 10.2 FL (ref 9.4–12.3)
POTASSIUM SERPL-SCNC: 4 MMOL/L (ref 3.5–5)
RBC # BLD: 4.93 M/UL (ref 4.2–5.4)
SODIUM BLD-SCNC: 145 MMOL/L (ref 136–145)
TOTAL PROTEIN: 7.1 G/DL (ref 6.6–8.7)
WBC # BLD: 4.2 K/UL (ref 4.8–10.8)

## 2019-12-08 PROBLEM — E87.6 HYPOKALEMIA: Status: ACTIVE | Noted: 2019-12-08

## 2019-12-08 ASSESSMENT — ENCOUNTER SYMPTOMS
COUGH: 0
EYES NEGATIVE: 1
CONSTIPATION: 0
BACK PAIN: 0
SHORTNESS OF BREATH: 0
EYE REDNESS: 0
RESPIRATORY NEGATIVE: 1
BLOOD IN STOOL: 0
GASTROINTESTINAL NEGATIVE: 1
DIARRHEA: 0
EYE DISCHARGE: 0
VOMITING: 0
SORE THROAT: 0
ABDOMINAL PAIN: 0
NAUSEA: 0
EYE PAIN: 0
WHEEZING: 0

## 2020-01-15 RX ORDER — DEXTROAMPHETAMINE SACCHARATE, AMPHETAMINE ASPARTATE MONOHYDRATE, DEXTROAMPHETAMINE SULFATE AND AMPHETAMINE SULFATE 5; 5; 5; 5 MG/1; MG/1; MG/1; MG/1
CAPSULE, EXTENDED RELEASE ORAL
Qty: 30 CAPSULE | Refills: 0 | Status: SHIPPED | OUTPATIENT
Start: 2020-01-15 | End: 2020-02-17

## 2020-02-17 RX ORDER — DEXTROAMPHETAMINE SACCHARATE, AMPHETAMINE ASPARTATE MONOHYDRATE, DEXTROAMPHETAMINE SULFATE AND AMPHETAMINE SULFATE 5; 5; 5; 5 MG/1; MG/1; MG/1; MG/1
CAPSULE, EXTENDED RELEASE ORAL
Qty: 30 CAPSULE | Refills: 0 | Status: SHIPPED | OUTPATIENT
Start: 2020-02-17 | End: 2020-03-16

## 2020-02-28 ENCOUNTER — HOSPITAL ENCOUNTER (OUTPATIENT)
Dept: INFUSION THERAPY | Age: 40
End: 2020-02-28
Payer: COMMERCIAL

## 2020-03-16 RX ORDER — DEXTROAMPHETAMINE SACCHARATE, AMPHETAMINE ASPARTATE MONOHYDRATE, DEXTROAMPHETAMINE SULFATE AND AMPHETAMINE SULFATE 5; 5; 5; 5 MG/1; MG/1; MG/1; MG/1
CAPSULE, EXTENDED RELEASE ORAL
Qty: 30 CAPSULE | Refills: 0 | Status: SHIPPED | OUTPATIENT
Start: 2020-03-16 | End: 2020-04-15 | Stop reason: SDUPTHER

## 2020-03-23 ENCOUNTER — NURSE TRIAGE (OUTPATIENT)
Dept: OTHER | Facility: CLINIC | Age: 40
End: 2020-03-23

## 2020-03-23 NOTE — TELEPHONE ENCOUNTER
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 She states she also works at [employer] and her co worker was tested + for [employer] . It was posted on facebook by the patient's aunt that she had + test.  [employer] did not notify her. Employee was with her last on 3/20/20. Employee is not sure if this person had sx at that time. She was with her for 6 hours. employee has body aches, sore throat, redness in one eye. Denies cough or congestion. She has not checked her temperature. She did have diarrhea today. Her next shift to work is 3/25/20. She did work today at [employer]. E mail sent to [employer] and [email or web address]. Discussed using My chart e visit with their PCP and the [employer] virtual visit, coupon code: [id number].

## 2020-03-24 ENCOUNTER — TELEPHONE (OUTPATIENT)
Dept: PRIMARY CARE CLINIC | Age: 40
End: 2020-03-24

## 2020-03-24 ENCOUNTER — OFFICE VISIT (OUTPATIENT)
Age: 40
End: 2020-03-24
Payer: COMMERCIAL

## 2020-03-24 VITALS
TEMPERATURE: 97.9 F | OXYGEN SATURATION: 99 % | WEIGHT: 128 LBS | DIASTOLIC BLOOD PRESSURE: 70 MMHG | BODY MASS INDEX: 20.57 KG/M2 | SYSTOLIC BLOOD PRESSURE: 124 MMHG | HEIGHT: 66 IN | HEART RATE: 103 BPM

## 2020-03-24 LAB
INFLUENZA A ANTIBODY: NORMAL
INFLUENZA B ANTIBODY: NORMAL
S PYO AG THROAT QL: POSITIVE

## 2020-03-24 PROCEDURE — 99213 OFFICE O/P EST LOW 20 MIN: CPT | Performed by: NURSE PRACTITIONER

## 2020-03-24 PROCEDURE — 87804 INFLUENZA ASSAY W/OPTIC: CPT | Performed by: NURSE PRACTITIONER

## 2020-03-24 PROCEDURE — 87880 STREP A ASSAY W/OPTIC: CPT | Performed by: NURSE PRACTITIONER

## 2020-03-24 RX ORDER — AMOXICILLIN AND CLAVULANATE POTASSIUM 875; 125 MG/1; MG/1
1 TABLET, FILM COATED ORAL 2 TIMES DAILY
Qty: 20 TABLET | Refills: 0 | Status: SHIPPED | OUTPATIENT
Start: 2020-03-24 | End: 2020-04-03

## 2020-03-24 NOTE — PROGRESS NOTES
Chief Complaint   Patient presents with    Headache    Otalgia     right    Diarrhea    Generalized Body Aches    Pharyngitis       HPI    Respiratory Symptoms:  Patient complains of headache, body aches, sore throat, ear pain, and diarrhea for 3 days. Symptoms have been unchanged with time. She denies fever, shortness of breath. Relevant PMH: No pertinent PMH. Smoking history:  She  reports that she has quit smoking. Her smoking use included cigarettes. She has never used smokeless tobacco.     She has had ill contacts with coworker 6 days ago that is in hospital in [place] waiting results on covid-19- young female with no health problems that she works closely with at [employer]. Treatment to date: Acetaminophen. Pertinent travel history:  Patient has not travelled. Vitals:    03/24/20 0949   BP: 124/70   Pulse: 103   Temp: 97.9 °F (36.6 °C)   SpO2: 99%     Physical Exam  Constitutional:       Appearance: She is not ill-appearing. HENT:      Head: Normocephalic. Right Ear: Tympanic membrane normal. There is no impacted cerumen. Left Ear: Tympanic membrane normal. There is no impacted cerumen. Nose: Nose normal. No congestion or rhinorrhea. Mouth/Throat:      Mouth: Mucous membranes are moist.      Pharynx: Posterior oropharyngeal erythema present. No oropharyngeal exudate. Eyes:      Pupils: Pupils are equal, round, and reactive to light. Pulmonary:      Effort: Pulmonary effort is normal. No respiratory distress. Breath sounds: No stridor. No wheezing or rhonchi. Skin:     General: Skin is warm and dry. Neurological:      Mental Status: She is alert. Assessment/Plan:  [facility] was seen today for headache, otalgia, diarrhea, generalized body aches and pharyngitis.     Diagnoses and all orders for this visit:    Strep pharyngitis  -     POCT Influenza A/B  -     POCT rapid strep A  -     amoxicillin-clavulanate (AUGMENTIN) 875-125 MG per tablet; Take 1 tablet by mouth 2 times daily for 10 days         Plan     Flu- neg    Strep positive. Will treat with Augmentin. I do not suspect Covid 1. I do want her to stay homeuntil she finds out if her friend that she had close contact with is neg/pos. If pos,stay home until 4/3. If neg,can return to work Friday. Return if symptoms worsen or fail to improve.

## 2020-03-25 ENCOUNTER — PATIENT MESSAGE (OUTPATIENT)
Dept: PRIMARY CARE CLINIC | Age: 40
End: 2020-03-25

## 2020-03-26 ENCOUNTER — PATIENT MESSAGE (OUTPATIENT)
Dept: PRIMARY CARE CLINIC | Age: 40
End: 2020-03-26

## 2020-03-26 RX ORDER — CETIRIZINE HYDROCHLORIDE 10 MG/1
10 TABLET ORAL DAILY
Qty: 30 TABLET | Refills: 0 | Status: SHIPPED | OUTPATIENT
Start: 2020-03-26 | End: 2020-04-25

## 2020-03-26 RX ORDER — BUTALBITAL, ACETAMINOPHEN AND CAFFEINE 50; 325; 40 MG/1; MG/1; MG/1
1 TABLET ORAL EVERY 6 HOURS PRN
Qty: 45 TABLET | Refills: 0 | Status: SHIPPED | OUTPATIENT
Start: 2020-03-26 | End: 2020-06-12 | Stop reason: SDUPTHER

## 2020-03-26 RX ORDER — FLUTICASONE PROPIONATE 50 MCG
2 SPRAY, SUSPENSION (ML) NASAL DAILY
Qty: 1 BOTTLE | Refills: 3 | Status: SHIPPED | OUTPATIENT
Start: 2020-03-26 | End: 2020-09-10

## 2020-03-26 NOTE — TELEPHONE ENCOUNTER
From: Fatemeh Lopez  To: Bassam Bernal MD  Sent: 3/25/2020 9:47 PM CDT  Subject: Prescription Question    I still have a major headache and Tylenol isnt working. Can I get something called in for headaches?

## 2020-03-26 NOTE — TELEPHONE ENCOUNTER
From: Jack Hamilton  To: Marc Hopkins MD  Sent: 3/26/2020 9:44 AM CDT  Subject: Prescription Question    Im not feeling better they swabbed me and its strep. I now have drainage like sinus and my eyes are real itchy.      ----- Message -----   Bernabe Bernal MD   Sent:3/26/2020 9:40 AM CDT   To:Dionne Boston Hwang   Subject:RE: Prescription Question    I can send you over a few Fioricet for you to take. Are you feeling better than you were a few days ago? Is it just the headache now? Did they end up swabbing you?      ----- Message -----   Odin Medina   Sent:3/25/2020 9:47 PM CDT   To:Marla Cortez MD   Subject:Prescription Question    I still have a major headache and Tylenol isnt working. Can I get something called in for headaches?

## 2020-03-28 ENCOUNTER — HOSPITAL ENCOUNTER (EMERGENCY)
Age: 40
Discharge: HOME | End: 2020-03-29
Attending: PEDIATRICS
Payer: COMMERCIAL

## 2020-03-28 ENCOUNTER — APPOINTMENT (OUTPATIENT)
Dept: GENERAL RADIOLOGY | Age: 40
End: 2020-03-28
Payer: COMMERCIAL

## 2020-03-28 LAB
ALBUMIN SERPL-MCNC: 4.1 G/DL (ref 3.5–5.2)
ALP BLD-CCNC: 40 U/L (ref 35–104)
ALT SERPL-CCNC: 8 U/L (ref 5–33)
ANION GAP SERPL CALCULATED.3IONS-SCNC: 11 MMOL/L (ref 7–19)
AST SERPL-CCNC: 9 U/L (ref 5–32)
BASOPHILS ABSOLUTE: 0 K/UL (ref 0–0.2)
BASOPHILS RELATIVE PERCENT: 0.4 % (ref 0–1)
BILIRUB SERPL-MCNC: <0.2 MG/DL (ref 0.2–1.2)
BUN BLDV-MCNC: 14 MG/DL (ref 6–20)
CALCIUM SERPL-MCNC: 8.6 MG/DL (ref 8.6–10)
CHLORIDE BLD-SCNC: 111 MMOL/L (ref 98–111)
CO2: 22 MMOL/L (ref 22–29)
CREAT SERPL-MCNC: 0.8 MG/DL (ref 0.5–0.9)
EOSINOPHILS ABSOLUTE: 0 K/UL (ref 0–0.6)
EOSINOPHILS RELATIVE PERCENT: 0.3 % (ref 0–5)
GFR NON-AFRICAN AMERICAN: >60
GLUCOSE BLD-MCNC: 70 MG/DL (ref 74–109)
HCT VFR BLD CALC: 44.5 % (ref 37–47)
HEMOGLOBIN: 14.5 G/DL (ref 12–16)
IMMATURE GRANULOCYTES #: 0 K/UL
LYMPHOCYTES ABSOLUTE: 2 K/UL (ref 1.1–4.5)
LYMPHOCYTES RELATIVE PERCENT: 26.3 % (ref 20–40)
MAGNESIUM: 2 MG/DL (ref 1.6–2.6)
MCH RBC QN AUTO: 30.5 PG (ref 27–31)
MCHC RBC AUTO-ENTMCNC: 32.6 G/DL (ref 33–37)
MCV RBC AUTO: 93.5 FL (ref 81–99)
MONOCYTES ABSOLUTE: 0.4 K/UL (ref 0–0.9)
MONOCYTES RELATIVE PERCENT: 5.3 % (ref 0–10)
NEUTROPHILS ABSOLUTE: 5.1 K/UL (ref 1.5–7.5)
NEUTROPHILS RELATIVE PERCENT: 67.4 % (ref 50–65)
PDW BLD-RTO: 11.9 % (ref 11.5–14.5)
PLATELET # BLD: 144 K/UL (ref 130–400)
PMV BLD AUTO: 9.9 FL (ref 9.4–12.3)
POTASSIUM REFLEX MAGNESIUM: 3.5 MMOL/L (ref 3.5–5)
RAPID INFLUENZA  B AGN: NEGATIVE
RAPID INFLUENZA A AGN: NEGATIVE
RBC # BLD: 4.76 M/UL (ref 4.2–5.4)
S PYO AG THROAT QL: NEGATIVE
SODIUM BLD-SCNC: 144 MMOL/L (ref 136–145)
TOTAL PROTEIN: 6.5 G/DL (ref 6.6–8.7)
TROPONIN: <0.01 NG/ML (ref 0–0.03)
WBC # BLD: 7.6 K/UL (ref 4.8–10.8)

## 2020-03-28 PROCEDURE — 87804 INFLUENZA ASSAY W/OPTIC: CPT

## 2020-03-28 PROCEDURE — 36415 COLL VENOUS BLD VENIPUNCTURE: CPT

## 2020-03-28 PROCEDURE — 80053 COMPREHEN METABOLIC PANEL: CPT

## 2020-03-28 PROCEDURE — 71045 X-RAY EXAM CHEST 1 VIEW: CPT

## 2020-03-28 PROCEDURE — 99285 EMERGENCY DEPT VISIT HI MDM: CPT

## 2020-03-28 PROCEDURE — 83735 ASSAY OF MAGNESIUM: CPT

## 2020-03-28 PROCEDURE — 87081 CULTURE SCREEN ONLY: CPT

## 2020-03-28 PROCEDURE — 93005 ELECTROCARDIOGRAM TRACING: CPT | Performed by: PEDIATRICS

## 2020-03-28 PROCEDURE — 84484 ASSAY OF TROPONIN QUANT: CPT

## 2020-03-28 PROCEDURE — 85025 COMPLETE CBC W/AUTO DIFF WBC: CPT

## 2020-03-28 PROCEDURE — 87880 STREP A ASSAY W/OPTIC: CPT

## 2020-03-28 ASSESSMENT — ENCOUNTER SYMPTOMS
RHINORRHEA: 0
EYE DISCHARGE: 0
CHEST TIGHTNESS: 1
SORE THROAT: 1
NAUSEA: 0
WHEEZING: 0
SHORTNESS OF BREATH: 0
COUGH: 1
BACK PAIN: 0
ABDOMINAL PAIN: 0
VOMITING: 0
DIARRHEA: 1

## 2020-03-29 ENCOUNTER — LAB REQUISITION (OUTPATIENT)
Dept: LAB | Facility: HOSPITAL | Age: 40
End: 2020-03-29

## 2020-03-29 VITALS
BODY MASS INDEX: 20.57 KG/M2 | HEIGHT: 66 IN | RESPIRATION RATE: 18 BRPM | SYSTOLIC BLOOD PRESSURE: 108 MMHG | TEMPERATURE: 98.1 F | DIASTOLIC BLOOD PRESSURE: 68 MMHG | OXYGEN SATURATION: 100 % | HEART RATE: 84 BPM | WEIGHT: 128 LBS

## 2020-03-29 DIAGNOSIS — Z00.00 ROUTINE GENERAL MEDICAL EXAMINATION AT A HEALTH CARE FACILITY: ICD-10-CM

## 2020-03-29 LAB
B PARAPERT DNA SPEC QL NAA+PROBE: NOT DETECTED
B PERT DNA SPEC QL NAA+PROBE: NOT DETECTED
C PNEUM DNA NPH QL NAA+NON-PROBE: NOT DETECTED
FLUAV H1 2009 PAND RNA NPH QL NAA+PROBE: NOT DETECTED
FLUAV H1 HA GENE NPH QL NAA+PROBE: NOT DETECTED
FLUAV H3 RNA NPH QL NAA+PROBE: NOT DETECTED
FLUAV SUBTYP SPEC NAA+PROBE: NOT DETECTED
FLUBV RNA ISLT QL NAA+PROBE: NOT DETECTED
HADV DNA SPEC NAA+PROBE: NOT DETECTED
HCOV 229E RNA SPEC QL NAA+PROBE: NOT DETECTED
HCOV HKU1 RNA SPEC QL NAA+PROBE: NOT DETECTED
HCOV NL63 RNA SPEC QL NAA+PROBE: NOT DETECTED
HCOV OC43 RNA SPEC QL NAA+PROBE: NOT DETECTED
HMPV RNA NPH QL NAA+NON-PROBE: NOT DETECTED
HPIV1 RNA SPEC QL NAA+PROBE: NOT DETECTED
HPIV2 RNA SPEC QL NAA+PROBE: NOT DETECTED
HPIV3 RNA NPH QL NAA+PROBE: NOT DETECTED
HPIV4 P GENE NPH QL NAA+PROBE: NOT DETECTED
Lab: NORMAL
M PNEUMO IGG SER IA-ACNC: NOT DETECTED
REPORT: NORMAL
RHINOVIRUS RNA SPEC NAA+PROBE: NOT DETECTED
RSV RNA NPH QL NAA+NON-PROBE: NOT DETECTED
THIS TEST SENT TO: NORMAL

## 2020-03-29 PROCEDURE — 0100U HC BIOFIRE FILMARRAY RESP PANEL 2: CPT

## 2020-03-29 PROCEDURE — U0002 COVID-19 LAB TEST NON-CDC: HCPCS

## 2020-03-29 PROCEDURE — 0100U HC RESPIRPTHGN MULT REV TRANS & AMP PRB TECH 21 TRGT: CPT

## 2020-03-29 RX ORDER — BENZONATATE 100 MG/1
100 CAPSULE ORAL 3 TIMES DAILY PRN
Qty: 30 CAPSULE | Refills: 0 | Status: SHIPPED | OUTPATIENT
Start: 2020-03-29 | End: 2020-04-05

## 2020-03-29 NOTE — ED TRIAGE NOTES
Pt states that she went to the respiratory clinic on Tuesday. Pt states that she was having a sore throat. Pt states that she tested positive for strep. Pt states that today she started to feel worse. Pt reports cough and SOA. 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 Pt states that a friends family has been diagnosed COVID-23.

## 2020-03-29 NOTE — ED PROVIDER NOTES
light-headedness. Psychiatric/Behavioral: Negative for agitation and confusion. All other systems reviewed and are negative. PAST MEDICALHISTORY     Past Medical History:   Diagnosis Date    Clotting disorder ([address]     factor 14, 11    GERD (gastroesophageal reflux disease)     Headache          SURGICAL HISTORY       Past Surgical History:   Procedure Laterality Date    CHOLECYSTECTOMY      COLONOSCOPY      TN COLONOSCOPY FLX DX W/COLLJ SPEC WHEN PFRMD N/A 7/23/2018    Dr Amy Sanon, 15 yr (age 50) recall    TN EGD TRANSORAL BIOPSY SINGLE/MULTIPLE N/A 7/23/2018    Dr Demarco Huddleston (-) Cathy, (+) Barrettt's, (-) dysplasia--1st dx--1 yr recall    TONSILLECTOMY AND ADENOIDECTOMY      UPPER GASTROINTESTINAL ENDOSCOPY           CURRENT MEDICATIONS     Previous Medications    AMOXICILLIN-CLAVULANATE (AUGMENTIN) 875-125 MG PER TABLET    Take 1 tablet by mouth 2 times daily for 10 days    AMPHETAMINE-DEXTROAMPHETAMINE (ADDERALL XR) 20 MG EXTENDED RELEASE CAPSULE    TAKE 1 CAPSULE BY MOUTH DAILY IN THE MORNING    BUTALBITAL-ACETAMINOPHEN-CAFFEINE (FIORICET, ESGIC) -40 MG PER TABLET    Take 1 tablet by mouth every 6 hours as needed for Headaches    CETIRIZINE (ZYRTEC) 10 MG TABLET    Take 1 tablet by mouth daily    FLUTICASONE (FLONASE) 50 MCG/ACT NASAL SPRAY    2 sprays by Nasal route daily    NORETHINDRONE-ETHINYL ESTRADIOL-FE (LO LOESTRIN FE) 1 MG-10 MCG / 10 MCG TABLET    TAKE 1 TABLET BY MOUTH ONCE DAILY    ONDANSETRON (ZOFRAN-ODT) 4 MG DISINTEGRATING TABLET    Take 4 mg by mouth every 8 hours as needed for Nausea or Vomiting    TOPIRAMATE (TOPAMAX) 200 MG TABLET    Take 1 tablet by mouth 2 times daily       ALLERGIES     Oxaprozin; Tyloxapol;  Norco [hydrocodone-acetaminophen]; and Percocet [oxycodone-acetaminophen]    FAMILY HISTORY       Family History   Problem Relation Age of Onset    Esophageal Cancer Paternal Uncle     Colon Cancer Neg Hx     Colon Polyps Neg Hx     Liver Cancer Neg Hx  Liver Disease Neg Hx     Stomach Cancer Neg Hx     Rectal Cancer Neg Hx           SOCIAL HISTORY       Social History     Socioeconomic History    Marital status: Single     Spouse name: None    Number of children: None    Years of education: None    Highest education level: None   Occupational History    None   Social Needs    Financial resource strain: None    Food insecurity     Worry: None     Inability: None    Transportation needs     Medical: None     Non-medical: None   Tobacco Use    Smoking status: Former Smoker     Types: Cigarettes    Smokeless tobacco: Never Used   Substance and Sexual Activity    Alcohol use: No    Drug use: No    Sexual activity: Yes   Lifestyle    Physical activity     Days per week: None     Minutes per session: None    Stress: None   Relationships    Social connections     Talks on phone: None     Gets together: None     Attends Shinto service: None     Active member of club or organization: None     Attends meetings of clubs or organizations: None     Relationship status: None    Intimate partner violence     Fear of current or ex partner: None     Emotionally abused: None     Physically abused: None     Forced sexual activity: None   Other Topics Concern    None   Social History Narrative    None       SCREENINGS    [place] Coma Scale  Eye Opening: Spontaneous  Best Verbal Response: Oriented  Best Motor Response: Obeys commands  [place] Coma Scale Score: 15        PHYSICAL EXAM    (up to 7 for level 4, 8 or more for level 5)     ED Triage Vitals   BP Temp Temp src Pulse Resp SpO2 Height Weight   -- -- -- -- -- -- -- --       Physical Exam  Vitals signs and nursing note reviewed. Constitutional:       General: She is not in acute distress. Appearance: Normal appearance. She is not ill-appearing. HENT:      Head: Normocephalic and atraumatic.       Right Ear: Tympanic membrane, ear canal and external ear normal.      Left Ear: Tympanic membrane,

## 2020-03-30 ENCOUNTER — CARE COORDINATION (OUTPATIENT)
Dept: CARE COORDINATION | Age: 40
End: 2020-03-30

## 2020-03-30 LAB
EKG P AXIS: 63 DEGREES
EKG P-R INTERVAL: 120 MS
EKG Q-T INTERVAL: 376 MS
EKG QRS DURATION: 84 MS
EKG QTC CALCULATION (BAZETT): 418 MS
EKG T AXIS: 58 DEGREES
S PYO THROAT QL CULT: NORMAL

## 2020-03-30 PROCEDURE — 93010 ELECTROCARDIOGRAM REPORT: CPT | Performed by: INTERNAL MEDICINE

## 2020-04-03 ENCOUNTER — CARE COORDINATION (OUTPATIENT)
Dept: CARE COORDINATION | Age: 40
End: 2020-04-03

## 2020-04-12 LAB
REPORT: NORMAL
SARS-COV-2: NOT DETECTED
THIS TEST SENT TO: NORMAL

## 2020-04-13 ENCOUNTER — CARE COORDINATION (OUTPATIENT)
Dept: CARE COORDINATION | Age: 40
End: 2020-04-13

## 2020-04-14 ENCOUNTER — CARE COORDINATION (OUTPATIENT)
Dept: CARE COORDINATION | Age: 40
End: 2020-04-14

## 2020-04-14 NOTE — CARE COORDINATION
6 feet of separation between employees. ACM advised pt to discuss her concerns about work conditions with her supervisor. Pt voiced understanding.   Electronically signed by Mary Santos RN on 4/14/2020 at 1:58 PM

## 2020-04-15 ENCOUNTER — VIRTUAL VISIT (OUTPATIENT)
Dept: PRIMARY CARE CLINIC | Age: 40
End: 2020-04-15
Payer: COMMERCIAL

## 2020-04-15 PROCEDURE — 99214 OFFICE O/P EST MOD 30 MIN: CPT | Performed by: FAMILY MEDICINE

## 2020-04-15 RX ORDER — ESCITALOPRAM OXALATE 10 MG/1
10 TABLET ORAL DAILY
Qty: 30 TABLET | Refills: 3 | Status: SHIPPED | OUTPATIENT
Start: 2020-04-15 | End: 2020-09-10

## 2020-04-15 RX ORDER — DEXTROAMPHETAMINE SACCHARATE, AMPHETAMINE ASPARTATE MONOHYDRATE, DEXTROAMPHETAMINE SULFATE AND AMPHETAMINE SULFATE 5; 5; 5; 5 MG/1; MG/1; MG/1; MG/1
20 CAPSULE, EXTENDED RELEASE ORAL EVERY MORNING
Qty: 30 CAPSULE | Refills: 0 | Status: SHIPPED | OUTPATIENT
Start: 2020-04-15 | End: 2020-05-13

## 2020-04-15 RX ORDER — CLONAZEPAM 0.5 MG/1
0.5 TABLET ORAL 2 TIMES DAILY PRN
Qty: 45 TABLET | Refills: 0 | Status: SHIPPED | OUTPATIENT
Start: 2020-04-15 | End: 2020-09-14

## 2020-04-18 ASSESSMENT — ENCOUNTER SYMPTOMS
VOMITING: 0
BACK PAIN: 0
COUGH: 0
ABDOMINAL PAIN: 0
WHEEZING: 0
NAUSEA: 0
COLOR CHANGE: 0
DIARRHEA: 0
EYE DISCHARGE: 0

## 2020-04-18 NOTE — PROGRESS NOTES
Visit Medications    Medication Sig Taking? Authorizing Provider   amphetamine-dextroamphetamine (ADDERALL XR) 20 MG extended release capsule Take 1 capsule by mouth every morning for 30 days. Yes Elena Smith MD   clonazePAM (KLONOPIN) 0.5 MG tablet Take 1 tablet by mouth 2 times daily as needed for Anxiety for up to 30 days. Yes Elena Smith MD   escitalopram (LEXAPRO) 10 MG tablet Take 1 tablet by mouth daily Yes Elena Smith MD   butalbital-acetaminophen-caffeine (FIORICET, ESGIC) -40 MG per tablet Take 1 tablet by mouth every 6 hours as needed for Headaches  Marla Cortez MD   cetirizine (ZYRTEC) 10 MG tablet Take 1 tablet by mouth daily  Eelna Smith MD   fluticasone (FLONASE) 50 MCG/ACT nasal spray 2 sprays by Nasal route daily  Elena Smith MD   topiramate (TOPAMAX) 200 MG tablet Take 1 tablet by mouth 2 times daily  Elena Smith MD   norethindrone-ethinyl estradiol-Fe (LO LOESTRIN FE) 1 MG-10 MCG / 10 MCG tablet TAKE 1 TABLET BY MOUTH ONCE DAILY  Historical Provider, MD   ondansetron (ZOFRAN-ODT) 4 MG disintegrating tablet Take 4 mg by mouth every 8 hours as needed for Nausea or Vomiting  Historical Provider, MD       Social History     Tobacco Use    Smoking status: Former Smoker     Types: Cigarettes    Smokeless tobacco: Never Used   Substance Use Topics    Alcohol use: No    Drug use: No            PHYSICAL EXAMINATION:  Physical Exam  Constitutional:       General: She is not in acute distress. Appearance: Normal appearance. She is not ill-appearing. HENT:      Head: Normocephalic and atraumatic. Nose: Nose normal.   Eyes:      Extraocular Movements: Extraocular movements intact. Conjunctiva/sclera: Conjunctivae normal.   Neck:      Musculoskeletal: Normal range of motion. Skin:     Findings: No rash. Neurological:      General: No focal deficit present.       Mental Status: She is alert and oriented to person, place, and time. Mental status is at baseline. Cranial Nerves: No cranial nerve deficit. Psychiatric:         Attention and Perception: Attention normal.         Mood and Affect: Mood and affect normal.         Speech: Speech normal.         Behavior: Behavior normal. Behavior is cooperative. Thought Content: Thought content normal.         Cognition and Memory: Cognition and memory normal.         Judgment: Judgment normal.      Comments: Tearful           Due to this being a TeleHealth encounter, evaluation of the following organ systems is limited: Vitals/Constitutional/EENT/Resp/CV/GI//MS/Neuro/Skin/Heme-Lymph-Imm. ASSESSMENT/PLAN:  1. Attention deficit hyperactivity disorder (ADHD), unspecified ADHD type  Continue adderall xr.   - amphetamine-dextroamphetamine (ADDERALL XR) 20 MG extended release capsule; Take 1 capsule by mouth every morning for 30 days. Dispense: 30 capsule; Refill: 0    2. Anxiety  We are going to start lexapro and klonopin PRN for anxiety. Lyndall Carrel has been requested. Encouraged to only take klonopin very sparingly and that I would like for this to be a temporary medication for her. - clonazePAM (KLONOPIN) 0.5 MG tablet; Take 1 tablet by mouth 2 times daily as needed for Anxiety for up to 30 days. Dispense: 45 tablet; Refill: 0      No follow-ups on file. An  electronic signature was used to authenticate this note. --Owen Mcclellan MD on 4/18/2020 at 5:49 PM        Pursuant to the emergency declaration under the [facility] waiver authority and the [email or web address] and [employer] Act, this Virtual  Visit was conducted, with patient's consent, to reduce the patient's risk of exposure to COVID-19 and provide continuity of care for an established patient.     Services were provided through a video synchronous discussion virtually to substitute for in-person clinic visit.

## 2020-05-13 ENCOUNTER — TELEMEDICINE (OUTPATIENT)
Dept: PRIMARY CARE CLINIC | Age: 40
End: 2020-05-13
Payer: COMMERCIAL

## 2020-05-13 PROCEDURE — 99214 OFFICE O/P EST MOD 30 MIN: CPT | Performed by: FAMILY MEDICINE

## 2020-05-13 RX ORDER — DEXTROAMPHETAMINE SACCHARATE, AMPHETAMINE ASPARTATE MONOHYDRATE, DEXTROAMPHETAMINE SULFATE AND AMPHETAMINE SULFATE 6.25; 6.25; 6.25; 6.25 MG/1; MG/1; MG/1; MG/1
25 CAPSULE, EXTENDED RELEASE ORAL EVERY MORNING
Qty: 30 CAPSULE | Refills: 0 | Status: SHIPPED | OUTPATIENT
Start: 2020-05-13 | End: 2020-06-12 | Stop reason: SDUPTHER

## 2020-05-13 ASSESSMENT — ENCOUNTER SYMPTOMS
BACK PAIN: 0
EYE DISCHARGE: 0
ABDOMINAL PAIN: 0
COLOR CHANGE: 0
DIARRHEA: 0
VOMITING: 0
NAUSEA: 0
WHEEZING: 0
COUGH: 0

## 2020-05-13 NOTE — PROGRESS NOTES
amphetamine-dextroamphetamine (ADDERALL XR) 25 MG extended release capsule Take 1 capsule by mouth every morning for 30 days. Yes Yelena Alicia MD   clonazePAM (KLONOPIN) 0.5 MG tablet Take 1 tablet by mouth 2 times daily as needed for Anxiety for up to 30 days. Yelena Alicia MD   escitalopram (LEXAPRO) 10 MG tablet Take 1 tablet by mouth daily  Yelena Alicia MD   butalbital-acetaminophen-caffeine (FIORICET, ESGIC) -40 MG per tablet Take 1 tablet by mouth every 6 hours as needed for Headaches  Marla Cortez MD   fluticasone (FLONASE) 50 MCG/ACT nasal spray 2 sprays by Nasal route daily  Yelena Alicia MD   topiramate (TOPAMAX) 200 MG tablet Take 1 tablet by mouth 2 times daily  Yelena Alicia MD   norethindrone-ethinyl estradiol-Fe (LO LOESTRIN FE) 1 MG-10 MCG / 10 MCG tablet TAKE 1 TABLET BY MOUTH ONCE DAILY  Historical Provider, MD   ondansetron (ZOFRAN-ODT) 4 MG disintegrating tablet Take 4 mg by mouth every 8 hours as needed for Nausea or Vomiting  Historical Provider, MD       Social History     Tobacco Use    Smoking status: Former Smoker     Types: Cigarettes    Smokeless tobacco: Never Used   Substance Use Topics    Alcohol use: No    Drug use: No            PHYSICAL EXAMINATION:  Physical Exam  Constitutional:       General: She is not in acute distress. Appearance: Normal appearance. She is not ill-appearing. HENT:      Head: Normocephalic and atraumatic. Nose: Nose normal.   Eyes:      Extraocular Movements: Extraocular movements intact. Conjunctiva/sclera: Conjunctivae normal.   Neck:      Musculoskeletal: Normal range of motion. Skin:     Findings: No rash. Neurological:      General: No focal deficit present. Mental Status: She is alert and oriented to person, place, and time. Mental status is at baseline. Cranial Nerves: No cranial nerve deficit.    Psychiatric:         Attention and Perception: Attention normal.         Mood and Affect: Mood and affect normal.         Speech: Speech normal.         Behavior: Behavior normal. Behavior is cooperative. Thought Content: Thought content normal.         Cognition and Memory: Cognition and memory normal.         Judgment: Judgment normal.         Due to this being a TeleHealth encounter, evaluation of the following organ systems is limited: Vitals/Constitutional/EENT/Resp/CV/GI//MS/Neuro/Skin/Heme-Lymph-Imm. ASSESSMENT/PLAN:  1. Attention deficit hyperactivity disorder (ADHD), unspecified ADHD type  Adderall XR was increased to 25 mg daily. We will follow-up in 6 months for checkup unless she needs a sooner. - amphetamine-dextroamphetamine (ADDERALL XR) 25 MG extended release capsule; Take 1 capsule by mouth every morning for 30 days. Dispense: 30 capsule; Refill: 0    2. Anxiety  Continue Lexapro and Klonopin. Follow-up with us in 6 months for checkup unless needed sooner. Return if symptoms worsen or fail to improve. An  electronic signature was used to authenticate this note. --Francisco Prieto MD on 5/13/2020 at 2:27 PM      Pursuant to the emergency declaration under the [address] [facility] waiver authority and the [employer] and [employer] Act, this Virtual  Visit was conducted, with patient's consent, to reduce the patient's risk of exposure to COVID-19 and provide continuity of care for an established patient. Services were provided through a video synchronous discussion virtually to substitute for in-person clinic visit.

## 2020-06-12 RX ORDER — BUTALBITAL, ACETAMINOPHEN AND CAFFEINE 50; 325; 40 MG/1; MG/1; MG/1
1 TABLET ORAL EVERY 6 HOURS PRN
Qty: 45 TABLET | Refills: 0 | Status: SHIPPED | OUTPATIENT
Start: 2020-06-12 | End: 2021-03-13 | Stop reason: SDUPTHER

## 2020-06-12 RX ORDER — DEXTROAMPHETAMINE SACCHARATE, AMPHETAMINE ASPARTATE MONOHYDRATE, DEXTROAMPHETAMINE SULFATE AND AMPHETAMINE SULFATE 6.25; 6.25; 6.25; 6.25 MG/1; MG/1; MG/1; MG/1
25 CAPSULE, EXTENDED RELEASE ORAL EVERY MORNING
Qty: 30 CAPSULE | Refills: 0 | Status: SHIPPED | OUTPATIENT
Start: 2020-06-12 | End: 2020-07-13 | Stop reason: SDUPTHER

## 2020-07-14 RX ORDER — DEXTROAMPHETAMINE SACCHARATE, AMPHETAMINE ASPARTATE MONOHYDRATE, DEXTROAMPHETAMINE SULFATE AND AMPHETAMINE SULFATE 6.25; 6.25; 6.25; 6.25 MG/1; MG/1; MG/1; MG/1
25 CAPSULE, EXTENDED RELEASE ORAL EVERY MORNING
Qty: 30 CAPSULE | Refills: 0 | Status: SHIPPED | OUTPATIENT
Start: 2020-07-14 | End: 2020-08-13

## 2020-07-20 ENCOUNTER — NURSE TRIAGE (OUTPATIENT)
Dept: OTHER | Facility: CLINIC | Age: 40
End: 2020-07-20

## 2020-07-20 ENCOUNTER — HOSPITAL ENCOUNTER (EMERGENCY)
Age: 40
Discharge: HOME | End: 2020-07-20
Attending: EMERGENCY MEDICINE
Payer: COMMERCIAL

## 2020-07-20 ENCOUNTER — APPOINTMENT (OUTPATIENT)
Dept: CT IMAGING | Age: 40
End: 2020-07-20
Payer: COMMERCIAL

## 2020-07-20 VITALS
OXYGEN SATURATION: 100 % | TEMPERATURE: 98.7 F | RESPIRATION RATE: 18 BRPM | BODY MASS INDEX: 20.66 KG/M2 | SYSTOLIC BLOOD PRESSURE: 122 MMHG | WEIGHT: 128 LBS | DIASTOLIC BLOOD PRESSURE: 92 MMHG | HEART RATE: 67 BPM

## 2020-07-20 LAB
ALBUMIN SERPL-MCNC: 4.1 G/DL (ref 3.5–5.2)
ALP BLD-CCNC: 41 U/L (ref 35–104)
ALT SERPL-CCNC: 9 U/L (ref 5–33)
ANION GAP SERPL CALCULATED.3IONS-SCNC: 12 MMOL/L (ref 7–19)
AST SERPL-CCNC: 10 U/L (ref 5–32)
BILIRUB SERPL-MCNC: <0.2 MG/DL (ref 0.2–1.2)
BILIRUBIN URINE: NEGATIVE
BLOOD, URINE: NEGATIVE
BUN BLDV-MCNC: 15 MG/DL (ref 6–20)
CALCIUM SERPL-MCNC: 8.4 MG/DL (ref 8.6–10)
CHLORIDE BLD-SCNC: 112 MMOL/L (ref 98–111)
CLARITY: ABNORMAL
CO2: 17 MMOL/L (ref 22–29)
COLOR: YELLOW
CREAT SERPL-MCNC: 0.6 MG/DL (ref 0.5–0.9)
GFR AFRICAN AMERICAN: >59
GFR NON-AFRICAN AMERICAN: >60
GLUCOSE BLD-MCNC: 101 MG/DL (ref 74–109)
GLUCOSE URINE: NEGATIVE MG/DL
HCG QUALITATIVE: NEGATIVE
HCT VFR BLD CALC: 44.4 % (ref 37–47)
HEMOGLOBIN: 14.3 G/DL (ref 12–16)
KETONES, URINE: NEGATIVE MG/DL
LEUKOCYTE ESTERASE, URINE: NEGATIVE
LIPASE: 34 U/L (ref 13–60)
MCH RBC QN AUTO: 31.7 PG (ref 27–31)
MCHC RBC AUTO-ENTMCNC: 32.2 G/DL (ref 33–37)
MCV RBC AUTO: 98.4 FL (ref 81–99)
NITRITE, URINE: NEGATIVE
PDW BLD-RTO: 12 % (ref 11.5–14.5)
PH UA: 5.5 (ref 5–8)
PLATELET # BLD: 131 K/UL (ref 130–400)
PMV BLD AUTO: 10.3 FL (ref 9.4–12.3)
POTASSIUM REFLEX MAGNESIUM: 4 MMOL/L (ref 3.5–5)
PROTEIN UA: NEGATIVE MG/DL
RBC # BLD: 4.51 M/UL (ref 4.2–5.4)
SODIUM BLD-SCNC: 141 MMOL/L (ref 136–145)
SPECIFIC GRAVITY UA: 1.02 (ref 1–1.03)
TOTAL PROTEIN: 6.4 G/DL (ref 6.6–8.7)
TROPONIN: <0.01 NG/ML (ref 0–0.03)
UROBILINOGEN, URINE: 0.2 E.U./DL
WBC # BLD: 5.9 K/UL (ref 4.8–10.8)

## 2020-07-20 PROCEDURE — 96375 TX/PRO/DX INJ NEW DRUG ADDON: CPT

## 2020-07-20 PROCEDURE — 6360000002 HC RX W HCPCS

## 2020-07-20 PROCEDURE — 83690 ASSAY OF LIPASE: CPT

## 2020-07-20 PROCEDURE — 93005 ELECTROCARDIOGRAM TRACING: CPT | Performed by: EMERGENCY MEDICINE

## 2020-07-20 PROCEDURE — 96374 THER/PROPH/DIAG INJ IV PUSH: CPT

## 2020-07-20 PROCEDURE — 84484 ASSAY OF TROPONIN QUANT: CPT

## 2020-07-20 PROCEDURE — 2500000003 HC RX 250 WO HCPCS: Performed by: EMERGENCY MEDICINE

## 2020-07-20 PROCEDURE — 6360000002 HC RX W HCPCS: Performed by: EMERGENCY MEDICINE

## 2020-07-20 PROCEDURE — 99284 EMERGENCY DEPT VISIT MOD MDM: CPT

## 2020-07-20 PROCEDURE — 2580000003 HC RX 258: Performed by: EMERGENCY MEDICINE

## 2020-07-20 PROCEDURE — 6360000004 HC RX CONTRAST MEDICATION: Performed by: EMERGENCY MEDICINE

## 2020-07-20 PROCEDURE — 36415 COLL VENOUS BLD VENIPUNCTURE: CPT

## 2020-07-20 PROCEDURE — 81003 URINALYSIS AUTO W/O SCOPE: CPT

## 2020-07-20 PROCEDURE — 74177 CT ABD & PELVIS W/CONTRAST: CPT

## 2020-07-20 PROCEDURE — 84703 CHORIONIC GONADOTROPIN ASSAY: CPT

## 2020-07-20 PROCEDURE — 80053 COMPREHEN METABOLIC PANEL: CPT

## 2020-07-20 PROCEDURE — 85027 COMPLETE CBC AUTOMATED: CPT

## 2020-07-20 RX ORDER — PROMETHAZINE HYDROCHLORIDE 25 MG/1
25 TABLET ORAL EVERY 6 HOURS PRN
Qty: 15 TABLET | Refills: 0 | Status: SHIPPED | OUTPATIENT
Start: 2020-07-20 | End: 2020-07-27

## 2020-07-20 RX ORDER — METHYLPREDNISOLONE SODIUM SUCCINATE 125 MG/2ML
125 INJECTION, POWDER, LYOPHILIZED, FOR SOLUTION INTRAMUSCULAR; INTRAVENOUS ONCE
Status: COMPLETED | OUTPATIENT
Start: 2020-07-20 | End: 2020-07-20

## 2020-07-20 RX ORDER — DIPHENHYDRAMINE HYDROCHLORIDE 50 MG/ML
25 INJECTION INTRAMUSCULAR; INTRAVENOUS ONCE
Status: COMPLETED | OUTPATIENT
Start: 2020-07-20 | End: 2020-07-20

## 2020-07-20 RX ORDER — 0.9 % SODIUM CHLORIDE 0.9 %
1000 INTRAVENOUS SOLUTION INTRAVENOUS ONCE
Status: COMPLETED | OUTPATIENT
Start: 2020-07-20 | End: 2020-07-20

## 2020-07-20 RX ORDER — METHYLPREDNISOLONE SODIUM SUCCINATE 125 MG/2ML
INJECTION, POWDER, LYOPHILIZED, FOR SOLUTION INTRAMUSCULAR; INTRAVENOUS
Status: DISCONTINUED
Start: 2020-07-20 | End: 2020-07-20

## 2020-07-20 RX ORDER — 0.9 % SODIUM CHLORIDE 0.9 %
500 INTRAVENOUS SOLUTION INTRAVENOUS ONCE
Status: COMPLETED | OUTPATIENT
Start: 2020-07-20 | End: 2020-07-20

## 2020-07-20 RX ORDER — DIPHENHYDRAMINE HYDROCHLORIDE 50 MG/ML
INJECTION INTRAMUSCULAR; INTRAVENOUS
Status: DISCONTINUED
Start: 2020-07-20 | End: 2020-07-20

## 2020-07-20 RX ORDER — ONDANSETRON 2 MG/ML
4 INJECTION INTRAMUSCULAR; INTRAVENOUS ONCE
Status: COMPLETED | OUTPATIENT
Start: 2020-07-20 | End: 2020-07-20

## 2020-07-20 RX ADMIN — SODIUM CHLORIDE 500 ML: 9 INJECTION, SOLUTION INTRAVENOUS at 12:14

## 2020-07-20 RX ADMIN — DIPHENHYDRAMINE HYDROCHLORIDE 25 MG: 50 INJECTION INTRAMUSCULAR; INTRAVENOUS at 12:33

## 2020-07-20 RX ADMIN — SODIUM CHLORIDE 500 ML: 9 INJECTION, SOLUTION INTRAVENOUS at 09:58

## 2020-07-20 RX ADMIN — ONDANSETRON 4 MG: 2 INJECTION INTRAMUSCULAR; INTRAVENOUS at 09:58

## 2020-07-20 RX ADMIN — IOPAMIDOL 90 ML: 755 INJECTION, SOLUTION INTRAVENOUS at 12:17

## 2020-07-20 RX ADMIN — FAMOTIDINE 20 MG: 10 INJECTION, SOLUTION INTRAVENOUS at 12:34

## 2020-07-20 RX ADMIN — SODIUM CHLORIDE 1000 ML: 9 INJECTION, SOLUTION INTRAVENOUS at 14:14

## 2020-07-20 RX ADMIN — DIPHENHYDRAMINE HYDROCHLORIDE 25 MG: 50 INJECTION, SOLUTION INTRAMUSCULAR; INTRAVENOUS at 12:33

## 2020-07-20 RX ADMIN — METHYLPREDNISOLONE SODIUM SUCCINATE 125 MG: 125 INJECTION, POWDER, FOR SOLUTION INTRAMUSCULAR; INTRAVENOUS at 12:34

## 2020-07-20 ASSESSMENT — ENCOUNTER SYMPTOMS
EYE PAIN: 0
SHORTNESS OF BREATH: 0
ABDOMINAL PAIN: 1
NAUSEA: 1
VOMITING: 1
BLOOD IN STOOL: 0
DIARRHEA: 1

## 2020-07-20 ASSESSMENT — PAIN SCALES - GENERAL: PAINLEVEL_OUTOF10: 5

## 2020-07-20 NOTE — TELEPHONE ENCOUNTER
Reason for Disposition   Message left on identified voicemail    Protocols used: NO CONTACT OR DUPLICATE CONTACT CALL-ADULT-[place]    Patient called [address] pre-service center [facility]) to schedule appointment, with red flag complaint, left message for RN access for triage. Attempted to return patient's call. No answer. Left message. Call not triaged. Please do not respond to the triage nurse through this encounter. Any subsequent communication should be directly with the patient.

## 2020-07-20 NOTE — TELEPHONE ENCOUNTER
Received call from [facility]. Call disconnected prior to this writer speaking to the patient. No contact no triage           Please do not reply to the triage nurse through this encounter. Any subsequent communication should be directly with the patient.     Reason for Disposition   Caller has cancelled the call before the first contact    Protocols used: NO CONTACT OR DUPLICATE CONTACT CALL-ADULT-[place]

## 2020-07-20 NOTE — ED NOTES
ASSESSMENT:    SKIN:  Warm, dry, pink. Cap refill < 2 sec  CARDIAC:  S1 S2 noted  LUNGS: clear upper and lower lobes. Respirations even and unlabored. ABDOMEN: bowel sounds noted upper and lower quadrants. Soft and tender. Pt co abd pain and n/v/d x 3 days. EXTREMITIES: bilateral DP and PT. No edema noted. Pt alert and oriented x4. Pupils equal and reactive. No distress noted. Side rails up and call light within reach.        Kevan Vance RN  07/20/20 [id number]

## 2020-07-20 NOTE — ED PROVIDER NOTES
[facility] EMERGENCY DEPT  eMERGENCY dEPARTMENT eNCOUnter      Pt Name: Shahida Calderon  MRN: [id number]  [place] 1980  Date of evaluation: 7/20/2020  Provider: Miriam Bowers MD    [address]       Chief Complaint   Patient presents with    Emesis     reports N/V/D for several days         HISTORY OF PRESENT ILLNESS   (Location/Symptom, Timing/Onset,Context/Setting, Quality, Duration, Modifying Factors, Severity)  Note limiting factors. Shahida Calderon is a 42 y.o. female who presents to the emergency department due to nausea vomiting diarrhea. Has had nausea and abdominal discomfort for about 4-5 days. Mild chills. No fever. No pelvic discomfort discharge bleeding or urinary symptoms. For the past 2 or 3 days has had nausea vomiting diarrhea. No hematemesis or blood in stools. No COVID exposure she is aware of. Has a mild headache which similar to her typical headaches. No neck stiffness. No vision changes numbness or weakness. HPI    NursingNotes were reviewed. REVIEW OF SYSTEMS    (2-9 systems for level 4, 10 or more for level 5)     Review of Systems   Constitutional: Negative for fever. Eyes: Negative for pain and visual disturbance. Respiratory: Negative for shortness of breath. Cardiovascular: Negative for chest pain and palpitations. Gastrointestinal: Positive for abdominal pain, diarrhea, nausea and vomiting. Negative for blood in stool. Genitourinary: Negative for decreased urine volume, difficulty urinating, dysuria, flank pain, pelvic pain, vaginal bleeding, vaginal discharge and vaginal pain. Skin: Negative for rash. Neurological: Positive for headaches (mild generalized). Negative for weakness and numbness. All other systems reviewed and are negative. A complete review of systems was performed and is negative except as noted above in the HPI.        PAST MEDICAL HISTORY     Past Medical History:   Diagnosis Date    Clotting disorder ([address]     factor 14, 11    GERD (gastroesophageal reflux disease)     Headache          SURGICAL HISTORY       Past Surgical History:   Procedure Laterality Date    CHOLECYSTECTOMY      COLONOSCOPY      MO COLONOSCOPY FLX DX W/COLLJ SPEC WHEN PFRMD N/A 7/23/2018    Dr Ignacio Marquez, 14 yr (age 50) recall    MO EGD TRANSORAL BIOPSY SINGLE/MULTIPLE N/A 7/23/2018    Dr Rajinder Ortiz (-) Cathy, (+) Barrettt's, (-) dysplasia--1st dx--1 yr recall   [address]       Discharge Medication List as of 7/20/2020  3:31 PM      CONTINUE these medications which have NOT CHANGED    Details   amphetamine-dextroamphetamine (ADDERALL XR) 25 MG extended release capsule Take 1 capsule by mouth every morning for 30 days. , Disp-30 capsule,R-0Normal      butalbital-acetaminophen-caffeine (FIORICET, ESGIC) -40 MG per tablet Take 1 tablet by mouth every 6 hours as needed for Headaches, Disp-45 tablet,R-0Normal      clonazePAM (KLONOPIN) 0.5 MG tablet Take 1 tablet by mouth 2 times daily as needed for Anxiety for up to 30 days. , Disp-45 tablet, R-0Normal      escitalopram (LEXAPRO) 10 MG tablet Take 1 tablet by mouth daily, Disp-30 tablet, R-3Normal      fluticasone (FLONASE) 50 MCG/ACT nasal spray 2 sprays by Nasal route daily, Disp-1 Bottle, R-3Normal      topiramate (TOPAMAX) 200 MG tablet Take 1 tablet by mouth 2 times daily, Disp-180 tablet, R-3Normal      norethindrone-ethinyl estradiol-Fe (LO LOESTRIN FE) 1 MG-10 MCG / 10 MCG tablet TAKE 1 TABLET BY MOUTH ONCE DAILYHistorical Med      ondansetron (ZOFRAN-ODT) 4 MG disintegrating tablet Take 4 mg by mouth every 8 hours as needed for Nausea or VomitingHistorical Med             ALLERGIES     Iv dye [iodides]; Oxaprozin; Tyloxapol;  Norco [hydrocodone-acetaminophen]; and Percocet [oxycodone-acetaminophen]    FAMILY HISTORY       Family History   Problem Relation Age of Onset    Esophageal Cancer Paternal Uncle     Colon Cancer Neg Hx     Colon Polyps Neg Hx     Liver Cancer Neg Hx     Liver Disease Neg Hx     Stomach Cancer Neg Hx     Rectal Cancer Neg Hx           SOCIAL HISTORY       Social History     Socioeconomic History    Marital status: Single     Spouse name: None    Number of children: None    Years of education: None    Highest education level: None   Occupational History    None   Social Needs    Financial resource strain: None    Food insecurity     Worry: None     Inability: None    Transportation needs     Medical: None     Non-medical: None   Tobacco Use    Smoking status: Former Smoker     Types: Cigarettes    Smokeless tobacco: Never Used   Substance and Sexual Activity    Alcohol use: No    Drug use: No    Sexual activity: Yes   Lifestyle    Physical activity     Days per week: None     Minutes per session: None    Stress: None   Relationships    Social connections     Talks on phone: None     Gets together: None     Attends Jainism service: None     Active member of club or organization: None     Attends meetings of clubs or organizations: None     Relationship status: None    Intimate partner violence     Fear of current or ex partner: None     Emotionally abused: None     Physically abused: None     Forced sexual activity: None   Other Topics Concern    None   Social History Narrative    None       SCREENINGS             PHYSICAL EXAM    (up to 7 for level 4, 8 or more for level 5)     ED Triage Vitals [07/20/20 0938]   BP Temp Temp src Pulse Resp SpO2 Height Weight   (!) 104/98 98.7 °F (37.1 °C) -- -- 20 98 % -- 128 lb (58.1 kg)       Physical Exam  Vitals signs reviewed. Constitutional:       General: She is not in acute distress. Appearance: She is well-developed. HENT:      Head: Normocephalic and atraumatic.       Right Ear: Tympanic membrane, ear canal and external ear normal.      Left Ear: Tympanic membrane, ear canal and external ear normal.      Mouth/Throat: Mouth: Mucous membranes are moist.      Pharynx: Oropharynx is clear. Eyes:      General: No scleral icterus. Extraocular Movements: Extraocular movements intact. Pupils: Pupils are equal, round, and reactive to light. Neck:      Musculoskeletal: Normal range of motion and neck supple. No neck rigidity. Vascular: No JVD. Cardiovascular:      Rate and Rhythm: Normal rate and regular rhythm. Pulses: Normal pulses. Heart sounds: Normal heart sounds. No murmur. Pulmonary:      Effort: Pulmonary effort is normal. No respiratory distress. Breath sounds: Normal breath sounds. Abdominal:      General: There is no distension. Palpations: Abdomen is soft. There is no pulsatile mass. Tenderness: There is generalized abdominal tenderness. There is no guarding or rebound. Musculoskeletal: Normal range of motion. General: No swelling or tenderness. Lymphadenopathy:      Cervical: No cervical adenopathy. Skin:     General: Skin is warm and dry. Capillary Refill: Capillary refill takes less than 2 seconds. Neurological:      General: No focal deficit present. Mental Status: She is alert and oriented to person, place, and time. GCS: GCS eye subscore is 4. GCS verbal subscore is 5. GCS motor subscore is 6. Cranial Nerves: Cranial nerves are intact. Sensory: Sensation is intact. Motor: Motor function is intact. Psychiatric:         Mood and Affect: Mood normal.         Behavior: Behavior normal.         DIAGNOSTIC RESULTS     EKG: All EKG's are interpreted by the Emergency Department Physician who either signs or Co-signs this chart in the absence of a cardiologist.    Normal sinus rhythm. Normal QT. No signs of acute ischemia.     RADIOLOGY:   Non-plain film images such as CT, Ultrasound and MRI are read by the radiologist. Plainradiographic images are visualized and preliminarily interpreted by the emergency physician with the below findings:    Interpretation per the Radiologist below, if available at the time of this note:    CT ABDOMEN PELVIS W IV CONTRAST Additional Contrast? None   Final Result   1. No acute inflammatory process seen within the abdomen or pelvis. Moderate volume of stool in the colon. No evidence of bowel   dissection. No free air or abscess. 2. 2 cm follicle suspected of the right ovary. Signed by Dr Tessa Valles on 7/20/2020 12:38 PM            LABS:  Labs Reviewed   CBC - Abnormal; Notable for the following components:       Result Value    MCH 31.7 (*)     MCHC 32.2 (*)     All other components within normal limits   COMPREHENSIVE METABOLIC PANEL W/ REFLEX TO MG FOR LOW K - Abnormal; Notable for the following components:    Chloride 112 (*)     CO2 17 (*)     Calcium 8.4 (*)     Total Protein 6.4 (*)     All other components within normal limits   URINE RT REFLEX TO CULTURE - Abnormal; Notable for the following components:    Clarity, UA CLOUDY (*)     All other components within normal limits   LIPASE   HCG, SERUM, QUALITATIVE   TROPONIN   COVID-19       All other labs were within normal range or not returned as of this dictation. EMERGENCY DEPARTMENT COURSE and DIFFERENTIALDIAGNOSIS/MDM:   Vitals:    Vitals:    07/20/20 0938 07/20/20 1235   BP: (!) 104/98 (!) 122/92   Pulse:  67   Resp: 20 18   Temp: 98.7 °F (37.1 °C)    SpO2: 98% 100%   Weight: 128 lb (58.1 kg)        MDM  Possible reaction after contrast.  Patient became anxious and felt like she was having trouble swallowing. On exam she did not have anything that looked like a reaction. No rash. No hives. No angioedema. I think this may have just been anxiety but cannot be certain. Treated her with Benadryl Pepcid and Solu-Medrol and symptoms resolved completely very quickly. Patient is now resting comfortably and in no distress. She is nontoxic on exam.  Told her uncertain as far as etiology of her symptoms.   Told her about the small follicle in the right ovary. Think this is just incidental finding. She has no pelvic complaints. She has no tenderness over there. Tolerating oral intake here. Nontoxic on exam no distress. Think COVID is very unlikely but will send COVID swab just in case. Told patient would like her to stay in quarantine until results of this come back. Told her if it is positive she only needs to be in quarantine for 14 days or longer if symptoms do not resolve. Looks like she might have some constipation. Recommend she try over-the-counter MiraLAX for this. Told to follow-up with her doctor and return to the ER if she has any change worsening symptoms or new concerns. Patient agreeable plan. Stable for discharge. CONSULTS:  None    PROCEDURES:  Unless otherwise notedbelow, none     Procedures    FINAL IMPRESSION     1. Nausea vomiting and diarrhea    2. Generalized abdominal pain    3. Allergic reaction, initial encounter    4. Cyst of right ovary    5.  Constipation, unspecified constipation type          DISPOSITION/PLAN   DISPOSITION Decision To Discharge 07/20/2020 03:30:48 PM      PATIENT REFERRED TO:  @FUP@    DISCHARGE MEDICATIONS:  Discharge Medication List as of 7/20/2020  3:31 PM      START taking these medications    Details   promethazine (PHENERGAN) 25 MG tablet Take 1 tablet by mouth every 6 hours as needed for Nausea, Disp-15 tablet,R-0Print                (Please note that portions of this note were completed with a voice recognition program.  Efforts were made to edit the dictations butoccasionally words are mis-transcribed.)    Leonardo Menchaca MD (electronically signed)  AttendingEmergency Physician        Leonardo Menchaca MD  07/21/20 9842

## 2020-07-20 NOTE — TELEPHONE ENCOUNTER
Reason for Disposition   MILD to MODERATE vomiting (e.g., 1-5 times/day) and lasts > 48 hours (2 days)    Answer Assessment - Initial Assessment Questions  1. VOMITING SEVERITY: \"How many times have you vomited in the past 24 hours? \"      - MILD:  1 - 2 times/day     - MODERATE: 3 - 5 times/day, decreased oral intake without significant weight loss or symptoms of dehydration     - SEVERE: 6 or more times/day, vomits everything or nearly everything, with significant weight loss, symptoms of dehydration       Last vomiting was 11:30 last night. Yesterday vomited everytime she drank something  2. ONSET: \"When did the vomiting begin? \"   Started 2 days ago  3. FLUIDS: \"What fluids or food have you vomited up today? \" \"Have you been able to keep any fluids down? \"  Staying hydrated  4. ABDOMINAL PAIN: [employer] your having any abdominal pain? \" If yes : \"How bad is it and what does it feel like? \" (e.g., crampy, dull, intermittent, constant)   Having abdominal pain  5. DIARRHEA: \"Is there any diarrhea? \" If so, ask: \"How many times today? \"     Some diarrhea  6. CONTACTS: \"Is there anyone else in the family with the same symptoms? \"   No ill contacts  7. CAUSE: \"What do you think is causing your vomiting? \"     unsure  8. HYDRATION STATUS: \"Any signs of dehydration? \" (e.g., dry mouth [not only dry lips], too weak to stand) \"When did you last urinate? \"    Trying to stay hydrated  9. OTHER SYMPTOMS: \"Do you have any other symptoms? \" (e.g., fever, headache, vertigo, vomiting blood or coffee grounds, recent head injury)   some headache. No dizziness  10. PREGNANCY: \"Is there any chance you are pregnant? \" \"When was your last menstrual period? \"  no    Protocols used: VOMITING-ADULT-OH    Received call from [facility]. Pt calling with vomiting for 2 days. Also, with abdominal pain and some diarrhea. Trying to stay hydrated. Recommend pt is seen today, call back if any new or worsening symptoms.     Call soft transferred to [facility] to schedule appointment. Please do not reply to the triage nurse through this encounter. Any subsequent communication should be directly with the patient.

## 2020-07-21 ENCOUNTER — CARE COORDINATION (OUTPATIENT)
Dept: CARE COORDINATION | Age: 40
End: 2020-07-21

## 2020-07-21 LAB
EKG P AXIS: 51 DEGREES
EKG P-R INTERVAL: 112 MS
EKG Q-T INTERVAL: 388 MS
EKG QRS DURATION: 78 MS
EKG QTC CALCULATION (BAZETT): 410 MS
EKG T AXIS: 52 DEGREES
REPORT: NORMAL
SARS-COV-2: NOT DETECTED
THIS TEST SENT TO: NORMAL

## 2020-07-21 PROCEDURE — 93010 ELECTROCARDIOGRAM REPORT: CPT | Performed by: INTERNAL MEDICINE

## 2020-07-21 RX ORDER — NORETHINDRONE ACETATE AND ETHINYL ESTRADIOL, ETHINYL ESTRADIOL AND FERROUS FUMARATE 1MG-10(24)
1 KIT ORAL DAILY
Qty: 28 TABLET | Refills: 5 | Status: SHIPPED | OUTPATIENT
Start: 2020-07-21 | End: 2020-11-03 | Stop reason: SDUPTHER

## 2020-07-21 NOTE — CARE COORDINATION
Patient contacted regarding Quang Oconnor. Discussed COVID-19 related testing which was pending at this time. Test results were pending. Patient informed of results, if available? Not available    Care Transition Nurse/ Ambulatory Care Manager contacted the patient by telephone to perform post discharge assessment. Verified name and  with patient as identifiers. Provided introduction to self, and explanation of the CTN/ACM role, and reason for call due to risk factors for infection and/or exposure to COVID-19. Symptoms reviewed with patient who verbalized the following symptoms: nausea and no worsening symptoms. Due to nausea controlled with medication encounter was not routed to provider for escalation. Discussed follow-up appointments. If no appointment was previously scheduled, appointment scheduling offered: no appt scheduled. Pt encouraged to call PCP and notify of her ER visit and if work excuse is needed while in quarantine. [facility] follow up appointment(s):   Future Appointments   Date Time Provider Gopi Rosales   2020 10:00 AM [facility]     [facility] follow up appointment(s): NA     Advance Care Planning:   Does patient have an Advance Directive:  not on file. Patient has following risk factors of: clotting disorder. CTN/ACM reviewed discharge instructions, medical action plan and red flags such as increased shortness of breath, increasing fever and signs of decompensation with patient who verbalized understanding. Discussed exposure protocols and quarantine with CDC Guidelines What to do if you are sick with coronavirus disease .  Patient was given an opportunity for questions and concerns. The patient agrees to contact the [facility] exposure line [phone], local health department [facility] Department of Health: ([phone]) and PCP office for questions related to their healthcare.  CTN/ACM provided contact information for future needs.    Reviewed and educated patient on any new and changed medications related to discharge diagnosis     Patient/family/caregiver given information for GetWell Loop and agrees to enroll Pt stated she was enrolled by staff at ER on 7/20  Patient's preferred e-mail: [email or web address]   Patient's preferred phone number: [phone]  Based on Loop alert triggers, patient will be contacted by nurse care manager for worsening symptoms. Pt will be further monitored by COVID Loop Team based on severity of symptoms and risk factors.

## 2020-07-23 ENCOUNTER — TELEPHONE (OUTPATIENT)
Dept: PRIMARY CARE CLINIC | Age: 40
End: 2020-07-23

## 2020-07-23 NOTE — LETTER
[place] [address] [address]  Phone: [phone]  Fax: [phone]    Marietta Silver MD        July 23, 2020     Patient: Kevin Cunha   YOB: 1980   Date of Visit: 7/23/2020       To Whom It May Concern: It is my medical opinion that Kevin Cunha may return to full duty immediately with no restrictions. If you have any questions or concerns, please don't hesitate to call.     Sincerely,        Marietta Silver MD

## 2020-07-23 NOTE — TELEPHONE ENCOUNTER
Pt is needing a return to work letter. She has been off due to N&V, [place]. Pt will return tomorrow. Pt will call back with the fax number. She works at [employer].

## 2020-07-31 ENCOUNTER — CARE COORDINATION (OUTPATIENT)
Dept: CARE COORDINATION | Age: 40
End: 2020-07-31

## 2020-07-31 NOTE — CARE COORDINATION
Yellow alert noted in Loop remote symptom monitoring program. Messaged patient to notify Sharyn Nava if symptoms have worsened since yesterday.

## 2020-08-13 RX ORDER — DEXTROAMPHETAMINE SACCHARATE, AMPHETAMINE ASPARTATE MONOHYDRATE, DEXTROAMPHETAMINE SULFATE AND AMPHETAMINE SULFATE 6.25; 6.25; 6.25; 6.25 MG/1; MG/1; MG/1; MG/1
CAPSULE, EXTENDED RELEASE ORAL
Qty: 30 CAPSULE | Refills: 0 | Status: SHIPPED | OUTPATIENT
Start: 2020-08-13 | End: 2020-09-15

## 2020-08-27 ENCOUNTER — PATIENT MESSAGE (OUTPATIENT)
Dept: PRIMARY CARE CLINIC | Age: 40
End: 2020-08-27

## 2020-08-27 RX ORDER — ONDANSETRON 4 MG/1
4 TABLET, ORALLY DISINTEGRATING ORAL 3 TIMES DAILY PRN
Qty: 30 TABLET | Refills: 0 | Status: SHIPPED | OUTPATIENT
Start: 2020-08-27 | End: 2020-09-10

## 2020-08-27 NOTE — TELEPHONE ENCOUNTER
From: Zandra Bishop  To: La Walsh MD  Sent: 8/27/2020 7:50 AM CDT  Subject: Prescription Question    Can we fill me some Zofran I am nauseated and have ran out but the phenergan makes me to tired.

## 2020-09-10 ENCOUNTER — OFFICE VISIT (OUTPATIENT)
Dept: PRIMARY CARE CLINIC | Age: 40
End: 2020-09-10
Payer: COMMERCIAL

## 2020-09-10 VITALS
DIASTOLIC BLOOD PRESSURE: 70 MMHG | TEMPERATURE: 98 F | HEART RATE: 105 BPM | HEIGHT: 66 IN | RESPIRATION RATE: 16 BRPM | SYSTOLIC BLOOD PRESSURE: 104 MMHG | BODY MASS INDEX: 21.31 KG/M2 | WEIGHT: 132.6 LBS | OXYGEN SATURATION: 99 %

## 2020-09-10 LAB
ALBUMIN SERPL-MCNC: 4.6 G/DL (ref 3.5–5.2)
ALP BLD-CCNC: 39 U/L (ref 35–104)
ALT SERPL-CCNC: 11 U/L (ref 5–33)
ANION GAP SERPL CALCULATED.3IONS-SCNC: 15 MMOL/L (ref 7–19)
AST SERPL-CCNC: 18 U/L (ref 5–32)
BASOPHILS ABSOLUTE: 0 K/UL (ref 0–0.2)
BASOPHILS RELATIVE PERCENT: 0.6 % (ref 0–1)
BILIRUB SERPL-MCNC: 0.8 MG/DL (ref 0.2–1.2)
BUN BLDV-MCNC: 11 MG/DL (ref 6–20)
CALCIUM SERPL-MCNC: 8.9 MG/DL (ref 8.6–10)
CHLORIDE BLD-SCNC: 105 MMOL/L (ref 98–111)
CO2: 19 MMOL/L (ref 22–29)
CREAT SERPL-MCNC: 0.6 MG/DL (ref 0.5–0.9)
EOSINOPHILS ABSOLUTE: 0 K/UL (ref 0–0.6)
EOSINOPHILS RELATIVE PERCENT: 0.3 % (ref 0–5)
GFR AFRICAN AMERICAN: >59
GFR NON-AFRICAN AMERICAN: >60
GLUCOSE BLD-MCNC: 91 MG/DL (ref 74–109)
HCT VFR BLD CALC: 43.2 % (ref 37–47)
HEMOGLOBIN: 14.3 G/DL (ref 12–16)
IMMATURE GRANULOCYTES #: 0 K/UL
IRON SATURATION: 44 % (ref 14–50)
IRON: 145 UG/DL (ref 37–145)
LYMPHOCYTES ABSOLUTE: 2 K/UL (ref 1.1–4.5)
LYMPHOCYTES RELATIVE PERCENT: 30.5 % (ref 20–40)
MCH RBC QN AUTO: 31.2 PG (ref 27–31)
MCHC RBC AUTO-ENTMCNC: 33.1 G/DL (ref 33–37)
MCV RBC AUTO: 94.1 FL (ref 81–99)
MONOCYTES ABSOLUTE: 0.4 K/UL (ref 0–0.9)
MONOCYTES RELATIVE PERCENT: 6.4 % (ref 0–10)
NEUTROPHILS ABSOLUTE: 4 K/UL (ref 1.5–7.5)
NEUTROPHILS RELATIVE PERCENT: 62 % (ref 50–65)
PDW BLD-RTO: 12.3 % (ref 11.5–14.5)
PLATELET # BLD: 166 K/UL (ref 130–400)
PMV BLD AUTO: 10.3 FL (ref 9.4–12.3)
POTASSIUM SERPL-SCNC: 3.6 MMOL/L (ref 3.5–5)
RBC # BLD: 4.59 M/UL (ref 4.2–5.4)
SODIUM BLD-SCNC: 139 MMOL/L (ref 136–145)
TOTAL IRON BINDING CAPACITY: 331 UG/DL (ref 250–400)
TOTAL PROTEIN: 7 G/DL (ref 6.6–8.7)
TSH SERPL DL<=0.05 MIU/L-ACNC: 5.34 UIU/ML (ref 0.27–4.2)
VITAMIN B-12: 1116 PG/ML (ref 211–946)
WBC # BLD: 6.4 K/UL (ref 4.8–10.8)

## 2020-09-10 PROCEDURE — 99214 OFFICE O/P EST MOD 30 MIN: CPT | Performed by: FAMILY MEDICINE

## 2020-09-10 PROCEDURE — 36415 COLL VENOUS BLD VENIPUNCTURE: CPT | Performed by: FAMILY MEDICINE

## 2020-09-10 RX ORDER — PROMETHAZINE HYDROCHLORIDE 25 MG/1
25 TABLET ORAL EVERY 6 HOURS PRN
COMMUNITY
End: 2020-11-16

## 2020-09-10 ASSESSMENT — PATIENT HEALTH QUESTIONNAIRE - PHQ9
1. LITTLE INTEREST OR PLEASURE IN DOING THINGS: 0
SUM OF ALL RESPONSES TO PHQ QUESTIONS 1-9: 0
2. FEELING DOWN, DEPRESSED OR HOPELESS: 0
SUM OF ALL RESPONSES TO PHQ9 QUESTIONS 1 & 2: 0
SUM OF ALL RESPONSES TO PHQ QUESTIONS 1-9: 0

## 2020-09-10 ASSESSMENT — ENCOUNTER SYMPTOMS
COLOR CHANGE: 0
DIARRHEA: 0
VOMITING: 0
COUGH: 0
ABDOMINAL PAIN: 0
NAUSEA: 1
EYE DISCHARGE: 0
WHEEZING: 0
BACK PAIN: 0

## 2020-09-10 NOTE — PROGRESS NOTES
Normal range of motion and neck supple. Cardiovascular:      Rate and Rhythm: Normal rate and regular rhythm. Pulses: Normal pulses. Heart sounds: Normal heart sounds. No murmur. Pulmonary:      Effort: Pulmonary effort is normal. No respiratory distress. Breath sounds: Normal breath sounds. Chest:      Breasts:         Right: No inverted nipple, nipple discharge or skin change. Left: Tenderness present. No inverted nipple, nipple discharge or skin change. Comments: Nonmobile nodules palpated in the areas diagrammed. These do not have a firm texture to them. Lymphadenopathy:      Upper Body:      Right upper body: No axillary adenopathy. Left upper body: No axillary adenopathy. Skin:     General: Skin is warm and dry. Neurological:      General: No focal deficit present. Mental Status: She is alert and oriented to person, place, and time. Mental status is at baseline. Psychiatric:         Mood and Affect: Mood normal.         Behavior: Behavior normal.         Thought Content: Thought content normal.         Judgment: Judgment normal.        /70 (Site: Left Upper Arm, Position: Sitting, Cuff Size: Medium Adult)   Pulse 105   Temp 98 °F (36.7 °C) (Temporal)   Resp 16   Ht 5' 6\" (1.676 m)   Wt 132 lb 9.6 oz (60.1 kg)   SpO2 99%   BMI 21.40 kg/m²      ASSESSMENT:    Erwin Otoole was seen today for breast mass, fatigue and nausea.     Diagnoses and all orders for this visit:    Nausea  -     [facility]  -     TSH without Reflex  -     CBC Auto Differential  -     Comprehensive Metabolic Panel  -     Iron and TIBC  -     Vitamin B12    Sorensen's esophagus without dysplasia  -     [facility]  -     TSH without Reflex  -     CBC Auto Differential  -     Comprehensive Metabolic Panel  -     Iron and TIBC  -     Vitamin B12    Fatigue, unspecified type  -     TSH without Reflex  - CBC Auto Differential  -     Comprehensive Metabolic Panel  -     Iron and TIBC  -     Vitamin B12    Breast mass in female  -     VIDAL DIGITAL DIAGNOSTIC W OR WO CAD BILATERAL; Future  -     US BREAST COMPLETE LEFT; Future  -     US BREAST COMPLETE RIGHT; Future        PLAN:    We are going to get her scheduled for diagnostic mammogram as well as ultrasound of her breast to the breast lumps. We will notify her of these results. I have ordered labs today. We will notify her of those results as well. I put a referral in for GI due to her nausea and her history of Sorensen's. She was previously being seen at [facility] however she was on a different insurance. Due to her being on her insurance we are going to refer her back to our GI group. EMR Dragon/transcription disclaimer:  Much of this encounter note is electronic transcription/translation of spoken language toprinted texts. The electronic translation of spoken language may be erroneous, or at times, nonsensical words or phrases may be inadvertently transcribed.   Although I have reviewed the note for such errors, some may stillexist.

## 2020-09-15 RX ORDER — CLONAZEPAM 0.5 MG/1
TABLET ORAL
Qty: 45 TABLET | Refills: 0 | Status: SHIPPED | OUTPATIENT
Start: 2020-09-15 | End: 2021-03-13 | Stop reason: SDUPTHER

## 2020-09-15 RX ORDER — DEXTROAMPHETAMINE SACCHARATE, AMPHETAMINE ASPARTATE MONOHYDRATE, DEXTROAMPHETAMINE SULFATE AND AMPHETAMINE SULFATE 6.25; 6.25; 6.25; 6.25 MG/1; MG/1; MG/1; MG/1
CAPSULE, EXTENDED RELEASE ORAL
Qty: 30 CAPSULE | Refills: 0 | Status: SHIPPED | OUTPATIENT
Start: 2020-09-15 | End: 2020-10-14 | Stop reason: SDUPTHER

## 2020-09-18 ENCOUNTER — HOSPITAL ENCOUNTER (OUTPATIENT)
Dept: WOMENS IMAGING | Age: 40
Discharge: HOME | End: 2020-09-18
Payer: COMMERCIAL

## 2020-09-18 PROCEDURE — 76642 ULTRASOUND BREAST LIMITED: CPT

## 2020-09-18 PROCEDURE — G0279 TOMOSYNTHESIS, MAMMO: HCPCS

## 2020-09-23 ENCOUNTER — OFFICE VISIT (OUTPATIENT)
Dept: GASTROENTEROLOGY | Age: 40
End: 2020-09-23
Payer: COMMERCIAL

## 2020-09-23 VITALS
OXYGEN SATURATION: 99 % | DIASTOLIC BLOOD PRESSURE: 70 MMHG | SYSTOLIC BLOOD PRESSURE: 110 MMHG | HEART RATE: 82 BPM | WEIGHT: 135 LBS | HEIGHT: 66 IN | BODY MASS INDEX: 21.69 KG/M2

## 2020-09-23 PROBLEM — K22.70 BARRETT'S ESOPHAGUS WITHOUT DYSPLASIA: Status: ACTIVE | Noted: 2020-09-23

## 2020-09-23 PROCEDURE — 99214 OFFICE O/P EST MOD 30 MIN: CPT | Performed by: NURSE PRACTITIONER

## 2020-09-23 RX ORDER — OMEPRAZOLE 20 MG/1
20 CAPSULE, DELAYED RELEASE ORAL DAILY
Qty: 30 CAPSULE | Refills: 11 | Status: SHIPPED | OUTPATIENT
Start: 2020-09-23 | End: 2020-11-16

## 2020-09-23 ASSESSMENT — ENCOUNTER SYMPTOMS
BACK PAIN: 0
ABDOMINAL DISTENTION: 0
ANAL BLEEDING: 0
SHORTNESS OF BREATH: 0
COUGH: 0
VOICE CHANGE: 0
TROUBLE SWALLOWING: 0
DIARRHEA: 0
VOMITING: 1
SORE THROAT: 0
NAUSEA: 1
CONSTIPATION: 0
BLOOD IN STOOL: 0
ABDOMINAL PAIN: 0
RECTAL PAIN: 0

## 2020-09-23 NOTE — PROGRESS NOTES
Subjective:      Lela Race is a38 y.o. female  Chief Complaint   Patient presents with    Nausea     referred here by her PCP       HPI  PCP: Terre Severance, MD  Referring Provider: Romina Sonia referred here by her PCP for c/o nausea. This started about 6 months ago. Has vomiting with this. And sometimes cant stop vomiting. Wakes up nauseated and it lasts all day. Not able to identify any triggers. Currently taking zofran and phenergan routinely and these dont help anything. Is on several meds that have an a/e of nausea/vomting. She reports she has been on these meds for a long time. Has migraines but doesn't feel the n/v is related to this. Wearing the mandated masks makes the nausea worse. So oftentimes she has to pull it down from below her nose, and this helps. Is s/p arya. Recent ED evaluation for this, lipase normal and CT negative from GI standpoint. Has barrets esophagus. Diagnosed in 2018 via EGD by Dr Jan Lea. Reports she made her own appt with [facility] for f/u. And had an EGD at [facility] in 2019. Reports \"they didn't want to scrape the barretts because it wasn't that bad. \"  Not on PPI. Has rare heartburn, but nothing daily or persistent. No further GI complaints. GI scope reports in history per MA per OV policy, I reviewed this. Family HX:  Paternal uncle had esophageal cancer  Pt denies family hx of colon polyps, colon CA, inflammatory bowel dx, gastric CA.     Past Medical History:   Diagnosis Date    Clotting disorder (HCC)     factor 14, 11    GERD (gastroesophageal reflux disease)     Headache           Past Surgical History:   Procedure Laterality Date    CHOLECYSTECTOMY      COLONOSCOPY  2005    \"NORMAL\" PER PT RECALL    COLONOSCOPY  2019    [address], [place]- \" normal\" per pt recall    [place] COLONOSCOPY FLX DX W/COLLJ SPEC WHEN PFRMD N/A 7/23/2018    Dr Rajinder Plascencia, 15 yr (age 50) recall    MN EGD TRANSORAL BIOPSY SINGLE/MULTIPLE N/A 7/23/2018 XR) 25 MG extended release capsule TAKE 1 CAPSULE BY MOUTH DAILY IN THE MORNING FOR 30 DAYS 30 capsule 0    clonazePAM (KLONOPIN) 0.5 MG tablet TAKE 1 TABLET BY MOUTH TWICE DAILY AS NEEDED FOR ANXIETY 45 tablet 0    promethazine (PHENERGAN) 25 MG tablet Take 25 mg by mouth every 6 hours as needed for Nausea      norethindrone-ethinyl estradiol-Fe (LO LOESTRIN FE) 1 MG-10 MCG / 10 MCG tablet Take 1 tablet by mouth daily 28 tablet 5    butalbital-acetaminophen-caffeine (FIORICET, ESGIC) -40 MG per tablet Take 1 tablet by mouth every 6 hours as needed for Headaches 45 tablet 0    topiramate (TOPAMAX) 200 MG tablet Take 1 tablet by mouth 2 times daily 180 tablet 3    ondansetron (ZOFRAN-ODT) 4 MG disintegrating tablet Take 4 mg by mouth every 8 hours as needed for Nausea or Vomiting       No current facility-administered medications for this visit. Review of Systems   Constitutional: Positive for fatigue. Negative for appetite change, fever and unexpected weight change. HENT: Negative for sore throat, trouble swallowing and voice change. Respiratory: Negative for cough and shortness of breath. Cardiovascular: Negative for chest pain, palpitations and leg swelling. Gastrointestinal: Positive for nausea and vomiting. Negative for abdominal distention, abdominal pain, anal bleeding, blood in stool, constipation, diarrhea and rectal pain. Genitourinary: Negative for hematuria. Musculoskeletal: Negative for arthralgias, back pain and neck pain. Neurological: Negative for dizziness, weakness, light-headedness and headaches. Psychiatric/Behavioral: Negative for dysphoric mood and sleep disturbance. The patient is nervous/anxious. All other systems reviewed and are negative. Objective:     Physical Exam  Vitals signs and nursing note reviewed. Constitutional:       Appearance: She is well-developed.       Comments: /70   Pulse 82   Ht 5' 6\" (1.676 m)   Wt 135 lb (61.2 kg) SpO2 99%   BMI 21.79 kg/m²    Eyes:      General: No scleral icterus. Conjunctiva/sclera: Conjunctivae normal.      Pupils: Pupils are equal, round, and reactive to light. Neck:      Musculoskeletal: Normal range of motion and neck supple. Thyroid: No thyromegaly. Cardiovascular:      Rate and Rhythm: Normal rate and regular rhythm. Heart sounds: Normal heart sounds. No murmur. No friction rub. No gallop. Pulmonary:      Effort: Pulmonary effort is normal. No respiratory distress. Breath sounds: Normal breath sounds. Abdominal:      General: Bowel sounds are normal. There is no distension. Palpations: Abdomen is soft. Tenderness: There is no abdominal tenderness. There is no rebound. Musculoskeletal: Normal range of motion. General: No deformity. Neurological:      Mental Status: She is alert and oriented to person, place, and time. Cranial Nerves: No cranial nerve deficit. Psychiatric:         Judgment: Judgment normal.           Assessment:       Diagnosis Orders   1. Nausea and vomiting, intractability of vomiting not specified, unspecified vomiting type  ESOPHAGOSCOPY / EGD   2. Sorensen's esophagus without dysplasia  omeprazole (PRILOSEC) 20 MG delayed release capsule    ESOPHAGOSCOPY / EGD         Plan:      1. escribed Prilosec 20mg daily #30 with 11 refills  2. Schedule outpatient endoscopy- r/o h pylori please and get barretts bx. Patient advised no Aspirin, Fish Oil, Vit E or NSAIDs 5 (five) days before procedure. Follow-up Visit: per Dr. Krystyna Macedo  Pt Education:   Risks, benefits, and alternatives to endoscopy were discussed. Patient voices understanding of risks of, but not limited to, perforation, bleeding, and infection. The risk of perforation is increased with esophageal dilatation. All questions answered to patient's satisfaction. Patient is agreable to proceed.

## 2020-09-23 NOTE — PATIENT INSTRUCTIONS
You are going to have an Endoscopy and here are some basic instructions:    Nothing to eat or drink after midnight EXCEPT:  PLEASE TAKE MEDICATION(S) FOR HIGH BLOOD PRESSURE, SEIZURES, HEART, AND THYROID WITH A SIP OF WATER AT LEAST 2 HOURS PRIOR TO ARRIVAL TIME.   YOU MAY ALSO TAKE ANY INHALERS YOU ARE PRESCRIBED. You will not be able to drive for 24 hours after the procedure due to sedation. Bring a  with you the day of the procedure. No aspirin, ibuprofen, naproxen, fish oil or vitamin E for 5 days before procedure. Continue current medications. If you are on blood thinners, clearance from the prescribing physician will be obtained before your procedure is scheduled. [email or web address] may be associated with discontinuation of your blood thinner and include, but not limited to, stroke, TIA, or cardiac event. If biopsies are taken during the procedure they will be sent to a pathologist for analysis. You will be notified by mail of the pathology results in 2-3 weeks. Your physician may also schedule a follow up appointment with the nurse practitioner to discuss pathology, symptoms or to check if you have had any problems related to your procedure. If you prefer not to return to the office after your procedure please discuss this with your physician on the day of your procedure.

## 2020-09-24 ENCOUNTER — TELEPHONE (OUTPATIENT)
Dept: GASTROENTEROLOGY | Age: 40
End: 2020-09-24

## 2020-09-25 ENCOUNTER — OFFICE VISIT (OUTPATIENT)
Age: 40
End: 2020-09-25

## 2020-09-25 VITALS — HEART RATE: 107 BPM | TEMPERATURE: 97.8 F | OXYGEN SATURATION: 99 %

## 2020-09-26 LAB — SARS-COV-2, NAA: NOT DETECTED

## 2020-09-30 ENCOUNTER — HOSPITAL ENCOUNTER (OUTPATIENT)
Age: 40
Setting detail: OUTPATIENT SURGERY
Discharge: HOME | End: 2020-09-30
Attending: INTERNAL MEDICINE | Admitting: INTERNAL MEDICINE
Payer: COMMERCIAL

## 2020-09-30 ENCOUNTER — ANESTHESIA (OUTPATIENT)
Dept: OPERATING ROOM | Age: 40
End: 2020-09-30

## 2020-09-30 ENCOUNTER — HOSPITAL ENCOUNTER (OUTPATIENT)
Age: 40
Setting detail: SPECIMEN
Discharge: HOME | End: 2020-09-30
Payer: COMMERCIAL

## 2020-09-30 ENCOUNTER — APPOINTMENT (OUTPATIENT)
Dept: OPERATING ROOM | Age: 40
End: 2020-09-30

## 2020-09-30 ENCOUNTER — ANESTHESIA EVENT (OUTPATIENT)
Dept: OPERATING ROOM | Age: 40
End: 2020-09-30

## 2020-09-30 VITALS
RESPIRATION RATE: 18 BRPM | SYSTOLIC BLOOD PRESSURE: 112 MMHG | DIASTOLIC BLOOD PRESSURE: 79 MMHG | BODY MASS INDEX: 20.89 KG/M2 | HEART RATE: 87 BPM | TEMPERATURE: 98.8 F | WEIGHT: 130 LBS | OXYGEN SATURATION: 96 % | HEIGHT: 66 IN

## 2020-09-30 VITALS — SYSTOLIC BLOOD PRESSURE: 99 MMHG | OXYGEN SATURATION: 96 % | DIASTOLIC BLOOD PRESSURE: 69 MMHG

## 2020-09-30 PROCEDURE — 43239 EGD BIOPSY SINGLE/MULTIPLE: CPT | Performed by: INTERNAL MEDICINE

## 2020-09-30 PROCEDURE — 88305 TISSUE EXAM BY PATHOLOGIST: CPT

## 2020-09-30 PROCEDURE — 43239 EGD BIOPSY SINGLE/MULTIPLE: CPT

## 2020-09-30 RX ORDER — LIDOCAINE HYDROCHLORIDE 10 MG/ML
INJECTION, SOLUTION INFILTRATION; PERINEURAL PRN
Status: DISCONTINUED | OUTPATIENT
Start: 2020-09-30 | End: 2020-09-30 | Stop reason: SDUPTHER

## 2020-09-30 RX ORDER — GLYCOPYRROLATE 0.6MG/3ML
SYRINGE (ML) INTRAVENOUS PRN
Status: DISCONTINUED | OUTPATIENT
Start: 2020-09-30 | End: 2020-09-30 | Stop reason: SDUPTHER

## 2020-09-30 RX ORDER — SODIUM CHLORIDE 9 MG/ML
INJECTION, SOLUTION INTRAVENOUS CONTINUOUS
Status: DISCONTINUED | OUTPATIENT
Start: 2020-09-30 | End: 2020-09-30 | Stop reason: HOSPADM

## 2020-09-30 RX ORDER — PROPOFOL 10 MG/ML
INJECTION, EMULSION INTRAVENOUS PRN
Status: DISCONTINUED | OUTPATIENT
Start: 2020-09-30 | End: 2020-09-30 | Stop reason: SDUPTHER

## 2020-09-30 RX ORDER — SODIUM CHLORIDE 9 MG/ML
INJECTION, SOLUTION INTRAVENOUS CONTINUOUS PRN
Status: DISCONTINUED | OUTPATIENT
Start: 2020-09-30 | End: 2020-09-30 | Stop reason: SDUPTHER

## 2020-09-30 RX ORDER — MIDAZOLAM HYDROCHLORIDE 1 MG/ML
INJECTION INTRAMUSCULAR; INTRAVENOUS PRN
Status: DISCONTINUED | OUTPATIENT
Start: 2020-09-30 | End: 2020-09-30 | Stop reason: SDUPTHER

## 2020-09-30 RX ADMIN — SODIUM CHLORIDE: 9 INJECTION, SOLUTION INTRAVENOUS at 11:20

## 2020-09-30 RX ADMIN — MIDAZOLAM HYDROCHLORIDE 2 MG: 1 INJECTION INTRAMUSCULAR; INTRAVENOUS at 12:24

## 2020-09-30 RX ADMIN — SODIUM CHLORIDE: 9 INJECTION, SOLUTION INTRAVENOUS at 12:22

## 2020-09-30 RX ADMIN — LIDOCAINE HYDROCHLORIDE 40 MG: 10 INJECTION, SOLUTION INFILTRATION; PERINEURAL at 12:29

## 2020-09-30 RX ADMIN — Medication 0.2 MG: at 12:27

## 2020-09-30 RX ADMIN — PROPOFOL 200 MG: 10 INJECTION, EMULSION INTRAVENOUS at 12:29

## 2020-09-30 NOTE — OP NOTE
Endoscopic Procedure Note    Patient: Mine Mcghee : 1980  Med Rec#: [id number] Acc#: [id number]     Primary Care Provider Stone Cowart MD    Endoscopist: Jordon New MD    Date of Procedure:  2020    Procedure:   1. EGD with cold biopsies    Indications:   1. Nausea and vomiting, intractability of vomiting not specified, unspecified vomiting type  ESOPHAGOSCOPY / EGD   2. Sorensen's esophagus without dysplasia       Anesthesia:  Sedation was administered by anesthesia who monitored the patient during the procedure. Estimated Blood Loss: minimal    Procedure:   After reviewing the patient's chart and obtaining informed consent, the patient was placed in the left lateral decubitus position. A forward-viewing Olympus endoscope was lubricated and inserted through the mouth into the oropharynx. Under direct visualization, the upper esophagus was intubated. The scope was advanced to the level of the third portion of duodenum. Vi  Scope was slowly withdrawn with careful inspection of the mucosal surfaces. The scope was retroflexed for inspection of the gastric fundus and incisura. Findings and maneuvers are listed in impression below. The patient tolerated the procedure well. The scope was removed. There were no immediate complications. Findings/IMPRESSION:  Esophagus: normal and a normal EG junction at 40 cm. No clear cut endoscopic evidence of Short segment Sorensen's Esophagus. NO erosions or ulcers or nodules or strictures or webs or rings or mass lesions or extrinsic compression or diverticula noted. Biopsies were taken from the EG junction to check for intestinal metaplasia and random cold biopsies were taken to check for EoE. There is NO hiatal hernia present. Stomach:  Normal. NO ulcers or masses or gastric outlet obstruction or retained food or fluid. Rugae were normal and lumen distended well with insufflation.  Retroflexed views otherwise revealed a normal GE

## 2020-09-30 NOTE — H&P
Patient Name: Tish Bosch  : 1980  MRN: [id number]  DATE: 20    Allergies: Allergies   Allergen Reactions    Iv Dye [Iodides] Itching     Itchy throat, redness    Oxaprozin     Tyloxapol     Norco [Hydrocodone-Acetaminophen] Rash    Percocet [Oxycodone-Acetaminophen] Rash        ENDOSCOPY  History and Physical    Procedure:    [] Diagnostic Colonoscopy       [] Screening Colonoscopy  [x] EGD      [] ERCP      [] EUS       [] Other    [x] Previous office notes/History and Physical reviewed from the patients chart. Please see EMR for further details of HPI. I have examined the patient's status immediately prior to the procedure and:      Indications/HPI:    1. Nausea and vomiting, intractability of vomiting not specified, unspecified vomiting type     2. Sorensen's esophagus without dysplasia     3. Paternal uncle had Esophageal cancer    []Abdominal Pain   []Cancer- GI/Lung     []Fhx of colon CA/polyps  []History of Polyps  []Barretts            []Melena  []Abnormal Imaging              []Dysphagia              []Persistent Pneumonia   []Anemia                            []Food Impaction        []History of Polyps  [] GI Bleed             []Pulmonary nodule/Mass   []Change in bowel habits []Heartburn/Reflux  []Rectal Bleed (BRBPR)  []Chest Pain - Non Cardiac []Heme (+) Stool []Ulcers  []Constipation  []Hemoptysis  []Varices  []Diarrhea  []Hypoxemia    []Nausea/Vomiting   []Screening   []Crohns/Colitis  []Other:     Anesthesia:   [x] MAC [] Moderate Sedation   [] General   [] None     ROS: 12 pt Review of Symptoms was negative unless mentioned above    Medications:   Prior to Admission medications    Medication Sig Start Date End Date Taking? Authorizing Provider   omeprazole (PRILOSEC) 20 MG delayed release capsule Take 1 capsule by mouth Daily Take first thing in the morning on an empty stomach.  20  Yes ALVERTO Dewey   amphetamine-dextroamphetamine (ADDERALL XR) 25 MG extended release capsule TAKE 1 CAPSULE BY MOUTH DAILY IN THE MORNING FOR 30 DAYS 9/15/20 10/15/20 Yes Boaz Taylor MD   clonazePAM (KLONOPIN) 0.5 MG tablet TAKE 1 TABLET BY MOUTH TWICE DAILY AS NEEDED FOR ANXIETY 9/15/20 10/15/20 Yes Boaz Taylor MD   promethazine (PHENERGAN) 25 MG tablet Take 25 mg by mouth every 6 hours as needed for Nausea   Yes Historical Provider, MD   norethindrone-ethinyl estradiol-Fe (LO LOESTRIN FE) 1 MG-10 MCG / 10 MCG tablet Take 1 tablet by mouth daily 7/21/20  Yes Boaz Taylor MD   butalbital-acetaminophen-caffeine (FIORICET, ESGIC) -40 MG per tablet Take 1 tablet by mouth every 6 hours as needed for Headaches 6/12/20  Yes Mark Whittington MD   topiramate (TOPAMAX) 200 MG tablet Take 1 tablet by mouth 2 times daily 11/14/19  Yes Boaz Taylor MD   ondansetron (ZOFRAN-ODT) 4 MG disintegrating tablet Take 4 mg by mouth every 8 hours as needed for Nausea or Vomiting   Yes Historical Provider, MD       Past Medical History:  Past Medical History:   Diagnosis Date    Clotting disorder ([address]     factor 14, 11    GERD (gastroesophageal reflux disease)     Headache        Past Surgical History:  Past Surgical History:   Procedure Laterality Date    CHOLECYSTECTOMY      COLONOSCOPY  2005    \"NORMAL\" PER PT RECALL    COLONOSCOPY  2019    [place]- \" normal\" per pt recall    IN COLONOSCOPY FLX DX W/COLLJ SPEC WHEN PFRMD N/A 7/23/2018    Dr Kelsey Burton, 15 yr (age 50) recall    IN EGD TRANSORAL BIOPSY SINGLE/MULTIPLE N/A 7/23/2018    Dr Dave Reece (-) Cathy, (+) Barrettt's, (-) dysplasia--1st dx--1 yr recall   [facility] ENDOSCOPY  2005    \"NORMAL\" PER PT RECALL    UPPER GASTROINTESTINAL ENDOSCOPY  2019    [place]- \"normal\" per pt recall       Social History:  Social History     Tobacco Use    Smoking status: Former Smoker     Types: Cigarettes    Smokeless tobacco: Never Used Substance Use Topics    Alcohol use: Yes     Comment: every week-end    Drug use: No       Vital Signs:   Vitals:    09/30/20 1112   BP: 101/62   Pulse: 70   Resp: 18   Temp: 98.8 °F (37.1 °C)   SpO2: 99%        Physical Exam:  Cardiac:  [x]WNL  []Comments:  Pulmonary:  [x]WNL   []Comments:  Neuro/Mental Status:  [x]WNL  []Comments:  Abdominal:  [x]WNL    []Comments:  Other:   []WNL  []Comments:    Informed Consent:  The risks and benefits of the procedure have been discussed with either the patient or if they cannot consent, their representative. Assessment:  Patient examined and appropriate for planned sedation and procedure. Plan:  Proceed with planned sedation and procedure as above.          Amira Araujo MD

## 2020-09-30 NOTE — ANESTHESIA PRE PROCEDURE
Department of Anesthesiology  Preprocedure Note       Name:  Kd Chandler   Age:  42 y.o.  :  1980                                          MRN:  [id number]         Date:  2020      Surgeon: Shahzad Orantes):  Emiliano Foote MD    Procedure: Procedure(s):    EGD BIOPSY      Medications prior to admission:   Prior to Admission medications    Medication Sig Start Date End Date Taking? Authorizing Provider   omeprazole (PRILOSEC) 20 MG delayed release capsule Take 1 capsule by mouth Daily Take first thing in the morning on an empty stomach. 20   ALVERTO Muñoz   amphetamine-dextroamphetamine (ADDERALL XR) 25 MG extended release capsule TAKE 1 CAPSULE BY MOUTH DAILY IN THE MORNING FOR 30 DAYS 9/15/20 10/15/20  Marla Cortez MD   clonazePAM (KLONOPIN) 0.5 MG tablet TAKE 1 TABLET BY MOUTH TWICE DAILY AS NEEDED FOR ANXIETY 9/15/20 10/15/20  Marla Cortez MD   promethazine (PHENERGAN) 25 MG tablet Take 25 mg by mouth every 6 hours as needed for Nausea    Historical MD En   norethindrone-ethinyl estradiol-Fe (LO LOESTRIN FE) 1 MG-10 MCG / 10 MCG tablet Take 1 tablet by mouth daily 20   Daniel Blue MD   butalbital-acetaminophen-caffeine (FIORICET, ESGIC) -40 MG per tablet Take 1 tablet by mouth every 6 hours as needed for Headaches 20   Manuel Albert MD   topiramate (TOPAMAX) 200 MG tablet Take 1 tablet by mouth 2 times daily 19   Marla Cortez MD   ondansetron (ZOFRAN-ODT) 4 MG disintegrating tablet Take 4 mg by mouth every 8 hours as needed for Nausea or Vomiting    Historical Provider, MD       Current medications:    No current outpatient medications on file. No current facility-administered medications for this visit. Allergies:     Allergies   Allergen Reactions    Iv Dye [Iodides] Itching     Itchy throat, redness    Oxaprozin     Tyloxapol     Norco [Hydrocodone-Acetaminophen] Rash    Percocet [Oxycodone-Acetaminophen] Rash       Problem List:    Patient Active Problem List   Diagnosis Code    Postprandial diarrhea K52.9    Chronic epigastric pain R10.13, G89.29    Chronic RUQ pain R10.11, G89.29    Nausea and vomiting R11.2    GERD (gastroesophageal reflux disease) K21.9    Bleeding diathesis (HCC) D69.9    Thrombocytopenia (HCC) D69.6    Other fatigue R53.83    Hypokalemia E87.6    Sorensen's esophagus without dysplasia K22.70       Past Medical History:        Diagnosis Date    Clotting disorder (HCC)     factor 14, 11    GERD (gastroesophageal reflux disease)     Headache        Past Surgical History:        Procedure Laterality Date    CHOLECYSTECTOMY      COLONOSCOPY  2005    \"NORMAL\" PER PT RECALL    COLONOSCOPY  2019    [facility], [place]- \" normal\" per pt recall    CT COLONOSCOPY FLX DX W/COLLJ SPEC WHEN PFRMD N/A 7/23/2018    Dr Mercedes Dakin, 15 yr (age 50) recall    CT EGD TRANSORAL BIOPSY SINGLE/MULTIPLE N/A 7/23/2018    Dr Wilson Mandujano (-) Cathy, (+) Barrettt's, (-) dysplasia--1st dx--1 yr recall    TONSILLECTOMY AND ADENOIDECTOMY      UPPER GASTROINTESTINAL ENDOSCOPY  2005    \"NORMAL\" PER PT RECALL    UPPER GASTROINTESTINAL ENDOSCOPY  2019    [facility], [place]- \"normal\" per pt recall       Social History:    Social History     Tobacco Use    Smoking status: Former Smoker     Types: Cigarettes    Smokeless tobacco: Never Used   Substance Use Topics    Alcohol use: Yes     Comment: every week-end                                Counseling given: Not Answered      Vital Signs (Current): There were no vitals filed for this visit.                                            BP Readings from Last 3 Encounters:   09/23/20 110/70   09/10/20 104/70   07/20/20 (!) 122/92       NPO Status:                                                                                 BMI:   Wt Readings from Last 3 Encounters:   09/23/20 135 lb (61.2 kg)   09/10/20 132 lb 9.6 oz (60.1 kg)   07/20/20 128 lb (58.1 kg)     There is no height or weight on file to calculate BMI.    CBC:   Lab Results   Component Value Date    WBC 6.4 09/10/2020    RBC 4.59 09/10/2020    HGB 14.3 09/10/2020    HCT 43.2 09/10/2020    MCV 94.1 09/10/2020    RDW 12.3 09/10/2020     09/10/2020       CMP:   Lab Results   Component Value Date     09/10/2020    K 3.6 09/10/2020    K 4.0 07/20/2020     09/10/2020    CO2 19 09/10/2020    BUN 11 09/10/2020    CREATININE 0.6 09/10/2020    GFRAA >59 09/10/2020    LABGLOM >60 09/10/2020    GLUCOSE 91 09/10/2020    PROT 7.0 09/10/2020    CALCIUM 8.9 09/10/2020    BILITOT 0.8 09/10/2020    ALKPHOS 39 09/10/2020    AST 18 09/10/2020    ALT 11 09/10/2020       POC Tests: No results for input(s): POCGLU, POCNA, POCK, POCCL, POCBUN, POCHEMO, POCHCT in the last 72 hours. Coags:   Lab Results   Component Value Date    PROTIME 10.5 09/27/2019    INR 1.0 09/27/2019    APTT 27 09/27/2019       HCG (If Applicable):   Lab Results   Component Value Date    PREGTESTUR Negative 07/23/2018        ABGs: No results found for: PHART, PO2ART, EYL5EEB, XRB4SVA, BEART, O8BLSPEL     Type & Screen (If Applicable):  No results found for: LABABO, [address]    Anesthesia Evaluation  Patient summary reviewed and Nursing notes reviewed  Airway: Mallampati: I  TM distance: >3 FB   Neck ROM: full  Mouth opening: > = 3 FB Dental:          Pulmonary:Negative Pulmonary ROS and normal exam  breath sounds clear to auscultation                             Cardiovascular:  Exercise tolerance: good (>4 METS),           Rhythm: regular  Rate: normal           Beta Blocker:  Not on Beta Blocker         Neuro/Psych:   (+) headaches:, depression/anxiety             GI/Hepatic/Renal:   (+) GERD:,           Endo/Other:    (+) blood dyscrasia (factors 14,11)::., .          Pt had no PAT visit       Abdominal:           Vascular: negative vascular ROS.                                          Anesthesia Plan      general     ASA 2

## 2020-09-30 NOTE — ANESTHESIA POSTPROCEDURE EVALUATION
Department of Anesthesiology  Postprocedure Note    Patient: Wu Gordon  MRN: [id number]  [place]: 1980  Date of evaluation: 9/30/2020  Time:  12:41 PM     Procedure Summary     Date:  09/30/20 Room / Location:  [facility] ENDO 02 / [address]    Anesthesia Start:  4962 Anesthesia Stop:      Procedure:  EGD BIOPSY (N/A Esophagus) Diagnosis:  (N/V)    Surgeon:  Senthil Waite MD Responsible Provider:  ALVERTO Cordoba CRNA    Anesthesia Type:  general ASA Status:  2          Anesthesia Type: general    Mk Phase I:      Mk Phase II:      Last vitals: Reviewed and per EMR flowsheets.        Anesthesia Post Evaluation    Patient location during evaluation: bedside  Patient participation: complete - patient participated  Level of consciousness: sleepy but conscious  Pain score: 0  Airway patency: patent  Nausea & Vomiting: no nausea and no vomiting  Complications: no  Cardiovascular status: hemodynamically stable and blood pressure returned to baseline  Respiratory status: acceptable  Hydration status: stable

## 2020-10-06 ENCOUNTER — TELEPHONE (OUTPATIENT)
Dept: GASTROENTEROLOGY | Age: 40
End: 2020-10-06

## 2020-10-14 RX ORDER — DEXTROAMPHETAMINE SACCHARATE, AMPHETAMINE ASPARTATE MONOHYDRATE, DEXTROAMPHETAMINE SULFATE AND AMPHETAMINE SULFATE 6.25; 6.25; 6.25; 6.25 MG/1; MG/1; MG/1; MG/1
25 CAPSULE, EXTENDED RELEASE ORAL EVERY MORNING
Qty: 30 CAPSULE | Refills: 0 | Status: SHIPPED | OUTPATIENT
Start: 2020-10-14 | End: 2020-11-16 | Stop reason: SDUPTHER

## 2020-10-16 ENCOUNTER — OFFICE VISIT (OUTPATIENT)
Dept: GASTROENTEROLOGY | Age: 40
End: 2020-10-16
Payer: COMMERCIAL

## 2020-10-16 VITALS
HEART RATE: 100 BPM | HEIGHT: 66 IN | WEIGHT: 136.2 LBS | OXYGEN SATURATION: 99 % | DIASTOLIC BLOOD PRESSURE: 72 MMHG | BODY MASS INDEX: 21.89 KG/M2 | SYSTOLIC BLOOD PRESSURE: 118 MMHG

## 2020-10-16 PROBLEM — R11.0 CHRONIC NAUSEA: Status: ACTIVE | Noted: 2020-10-16

## 2020-10-16 PROCEDURE — 99214 OFFICE O/P EST MOD 30 MIN: CPT | Performed by: NURSE PRACTITIONER

## 2020-10-16 ASSESSMENT — ENCOUNTER SYMPTOMS
ABDOMINAL PAIN: 0
SHORTNESS OF BREATH: 0
DIARRHEA: 0
COUGH: 0
CONSTIPATION: 0
VOMITING: 0
SORE THROAT: 0
RECTAL PAIN: 0
VOICE CHANGE: 0
ANAL BLEEDING: 0
TROUBLE SWALLOWING: 0
NAUSEA: 1
BLOOD IN STOOL: 0
ABDOMINAL DISTENTION: 0
BACK PAIN: 0

## 2020-10-16 NOTE — PROGRESS NOTES
Subjective:      William Ring is a38 y.o. female  Chief Complaint   Patient presents with    Nausea       HPI  PCP: Chet Mon MD   Pt returns today with c/o continued nausea. Recent EGD 9/30/2020 was unrevealing for etiology of her chronic nausea. She is s/p arya as well. As noted previously, she is on multiple medications with a/e of n/v.  We discussed this at her previous appt as well as again today. She reports the nausea isnt any better. The nausea waxes and wanes. Wakes up with nausea. It resolves when she is up moving around. And sometimes she has it during the day at work, but not that often. Then the nausea returns in the evening and it resolves when she lays down at night. But she also reports wearing the masks make it worse. She reports she has tried PPI that I prescribed to see if this would help the nausea, and it doesn't. Her PCP prescribes zofran for the nausea. She is taking 3-4 at a time. Which doesn't help. She reports she has been constipated since using so many zofran. Has also tried and failed phenergan. Family HX:    Pt denies family hx of colon polyps, colon CA, inflammatory bowel dx, gastric CA and esophageal CA.     Past Medical History:   Diagnosis Date    Clotting disorder (HCC)     factor 14, 11    GERD (gastroesophageal reflux disease)     Headache           Past Surgical History:   Procedure Laterality Date    CHOLECYSTECTOMY      COLONOSCOPY  2005    \"NORMAL\" PER PT RECALL    COLONOSCOPY  2019 [address] [facility], [place]- \" normal\" per pt recall    ID COLONOSCOPY FLX DX W/COLLJ SPEC WHEN PFRMD N/A 7/23/2018    Dr Selena Sparks, 15 yr (age 50) recall    ID EGD TRANSORAL BIOPSY SINGLE/MULTIPLE N/A 7/23/2018    Dr Sriram Amor (-) Cathy, (+) Barrettt's, (-) dysplasia--1st dx--1 yr recall   [facility] ENDOSCOPY  2005    \"NORMAL\" PER PT RECALL    UPPER GASTROINTESTINAL ENDOSCOPY  2019    [address] [facility], [address] [place] [address]- \"normal\" per pt recall    UPPER GASTROINTESTINAL ENDOSCOPY N/A 9/30/2020    Dr Elfego Yun exam,  NEG EoE, Neg Barretts, no recall       Social History     Socioeconomic History    Marital status: Single     Spouse name: None    Number of children: None    Years of education: None    Highest education level: None   Occupational History    None   Social Needs    Financial resource strain: None    Food insecurity     Worry: None     Inability: None    Transportation needs     Medical: None     Non-medical: None   Tobacco Use    Smoking status: Former Smoker     Types: Cigarettes    Smokeless tobacco: Never Used   Substance and Sexual Activity    Alcohol use: Yes     Comment: every week-end    Drug use: No    Sexual activity: Yes   Lifestyle    Physical activity     Days per week: None     Minutes per session: None    Stress: None   Relationships    Social connections     Talks on phone: None     Gets together: None     Attends Yarsani service: None     Active member of club or organization: None     Attends meetings of clubs or organizations: None     Relationship status: None    Intimate partner violence     Fear of current or ex partner: None     Emotionally abused: None     Physically abused: None     Forced sexual activity: None   Other Topics Concern    None   Social History Narrative    None       Allergies   Allergen Reactions    Iv Dye [Iodides] Itching     Itchy throat, redness    Oxaprozin     Tyloxapol     Norco [Hydrocodone-Acetaminophen] Rash    Percocet [Oxycodone-Acetaminophen] Rash       Current Outpatient Medications   Medication Sig Dispense Refill    amphetamine-dextroamphetamine (ADDERALL XR) 25 MG extended release capsule Take 1 capsule by mouth every morning for 30 days. 30 capsule 0    omeprazole (PRILOSEC) 20 MG delayed release capsule Take 1 capsule by mouth Daily Take first thing in the morning on an empty stomach.  30 capsule 11    clonazePAM (KLONOPIN) 0.5 MG tablet TAKE 1 TABLET BY MOUTH TWICE DAILY AS NEEDED FOR ANXIETY 45 tablet 0    promethazine (PHENERGAN) 25 MG tablet Take 25 mg by mouth every 6 hours as needed for Nausea      norethindrone-ethinyl estradiol-Fe (LO LOESTRIN FE) 1 MG-10 MCG / 10 MCG tablet Take 1 tablet by mouth daily 28 tablet 5    butalbital-acetaminophen-caffeine (FIORICET, ESGIC) -40 MG per tablet Take 1 tablet by mouth every 6 hours as needed for Headaches 45 tablet 0    topiramate (TOPAMAX) 200 MG tablet Take 1 tablet by mouth 2 times daily 180 tablet 3    ondansetron (ZOFRAN-ODT) 4 MG disintegrating tablet Take 4 mg by mouth every 8 hours as needed for Nausea or Vomiting       No current facility-administered medications for this visit. Review of Systems   Constitutional: Negative for appetite change, fatigue, fever and unexpected weight change. HENT: Negative for sore throat, trouble swallowing and voice change. Respiratory: Negative for cough and shortness of breath. Cardiovascular: Negative for chest pain, palpitations and leg swelling. Gastrointestinal: Positive for nausea. Negative for abdominal distention, abdominal pain, anal bleeding, blood in stool, constipation, diarrhea, rectal pain and vomiting. Genitourinary: Negative for hematuria. Musculoskeletal: Negative for arthralgias, back pain and neck pain. Neurological: Negative for dizziness, weakness, light-headedness and headaches. Psychiatric/Behavioral: Negative for dysphoric mood and sleep disturbance. The patient is not nervous/anxious. All other systems reviewed and are negative. Objective:     Physical Exam  Vitals signs and nursing note reviewed. Constitutional:       Appearance: She is well-developed. Comments: /72   Pulse 100   Ht 5' 6\" (1.676 m)   Wt 136 lb 3.2 oz (61.8 kg)   SpO2 99%   BMI 21.98 kg/m²    Eyes:      General: No scleral icterus.      Conjunctiva/sclera: Conjunctivae normal.      Pupils: Pupils are equal, round, and reactive to light. Neck:      Musculoskeletal: Normal range of motion and neck supple. Thyroid: No thyromegaly. Cardiovascular:      Rate and Rhythm: Normal rate and regular rhythm. Heart sounds: Normal heart sounds. No murmur. No friction rub. No gallop. Pulmonary:      Effort: Pulmonary effort is normal. No respiratory distress. Breath sounds: Normal breath sounds. Abdominal:      General: Bowel sounds are normal. There is no distension. Palpations: Abdomen is soft. Tenderness: There is no abdominal tenderness. There is no rebound. Musculoskeletal: Normal range of motion. General: No deformity. Neurological:      Mental Status: She is alert and oriented to person, place, and time. Cranial Nerves: No cranial nerve deficit. Psychiatric:         Judgment: Judgment normal.           Assessment:       Diagnosis Orders   1. Chronic nausea  NM GASTRIC EMPTYING         Plan:      1. Sent her home with a diatherix stool test to r/o h pylori  2. [facility]- will call her with results. 3. I advise discussing her routine meds with her PCP to see if any of these are causing this nausea  4.  Ill have her f/u with Dr Yung Garcia  for any further recommendations if the nausea persists

## 2020-10-21 ENCOUNTER — OFFICE VISIT (OUTPATIENT)
Dept: URGENT CARE | Age: 40
End: 2020-10-21
Payer: COMMERCIAL

## 2020-10-21 VITALS
TEMPERATURE: 98 F | BODY MASS INDEX: 21.02 KG/M2 | HEIGHT: 66 IN | DIASTOLIC BLOOD PRESSURE: 73 MMHG | SYSTOLIC BLOOD PRESSURE: 108 MMHG | RESPIRATION RATE: 20 BRPM | WEIGHT: 130.8 LBS | OXYGEN SATURATION: 99 % | HEART RATE: 88 BPM

## 2020-10-21 LAB
INFLUENZA A ANTIBODY: NEGATIVE
INFLUENZA B ANTIBODY: NEGATIVE
S PYO AG THROAT QL: NORMAL

## 2020-10-21 PROCEDURE — 99213 OFFICE O/P EST LOW 20 MIN: CPT | Performed by: NURSE PRACTITIONER

## 2020-10-21 PROCEDURE — 87804 INFLUENZA ASSAY W/OPTIC: CPT | Performed by: NURSE PRACTITIONER

## 2020-10-21 PROCEDURE — 87880 STREP A ASSAY W/OPTIC: CPT | Performed by: NURSE PRACTITIONER

## 2020-10-21 ASSESSMENT — ENCOUNTER SYMPTOMS
TROUBLE SWALLOWING: 0
ABDOMINAL PAIN: 0
SINUS PRESSURE: 0
NAUSEA: 0
WHEEZING: 0
VOMITING: 1
SORE THROAT: 0
APNEA: 0
STRIDOR: 0
DIARRHEA: 1
CHOKING: 0
CHEST TIGHTNESS: 0
ALLERGIC/IMMUNOLOGIC NEGATIVE: 1
SHORTNESS OF BREATH: 0
COLOR CHANGE: 0
COUGH: 0
RHINORRHEA: 0
EYES NEGATIVE: 1
VOICE CHANGE: 0

## 2020-10-21 NOTE — PATIENT INSTRUCTIONS
1. Strep and flu are negative  2. Increase fluids and rest  3. Rapid COVID test results will determine recommendations   4. Back to work tomorrow with no fever 24 hours and negative COVID  5.  Follow up as needed

## 2020-10-21 NOTE — PROGRESS NOTES
[address] [facility]  [address] [address] [place] [address]  Dept: [phone]  Dept Fax: [phone]  Loc: [phone]     William Ring is a 42 y.o. female who presents today for her medical conditions/complaintsas noted below. William Ring is c/o of Generalized Body Aches; Diarrhea; and Emesis        HPI:     HPI    Influenza  This is a new problem. The current episode started yesterday. The problem occurs constantly. The problem has been unchanged. Associated symptoms include fatigue, headaches, n/v/d,  and myalgias. Pertinent negatives include  abdominal pain, anorexia, arthralgias, change in bowel habit, chest pain, diaphoresis, joint swelling, nausea, neck pain, numbness, rash, sore throat, swollen glands, urinary symptoms, vertigo, visual change, vomiting or weakness. Nothing aggravates the symptoms. Patient has tried nothing for the symptoms. The treatment provided no relief. states she has been able to urinate today and has not vomited or had diarrhea at all today.      Results for orders placed or performed in visit on 10/21/20   POCT rapid strep A   Result Value Ref Range    Strep A Ag None Detected None Detected            Past Medical History:   Diagnosis Date    Clotting disorder (HCC)     factor 14, 11    GERD (gastroesophageal reflux disease)     Headache       Past Surgical History:   Procedure Laterality Date    CHOLECYSTECTOMY      COLONOSCOPY  2005    \"NORMAL\" PER PT RECALL    COLONOSCOPY  2019 [address] [place], [place]- \" normal\" per pt recall    [place] COLONOSCOPY FLX DX W/COLLJ SPEC WHEN PFRMD N/A 7/23/2018    Dr Selena Sparks, 15 yr (age 50) recall    [place] EGD TRANSORAL BIOPSY SINGLE/MULTIPLE N/A 7/23/2018    Dr Sriram Amor (-) Cathy, (+) Barrettt's, (-) dysplasia--1st dx--1 yr recall   [address]  2005    \"NORMAL\" PER PT RECALL    UPPER GASTROINTESTINAL ENDOSCOPY  2019    [address] [facility], [address] [place] [address]- \"normal\" per pt recall    UPPER GASTROINTESTINAL ENDOSCOPY N/A 9/30/2020    Dr Pastora Aceves exam,  NEG EoE, Neg Barretts, no recall       Family History   Problem Relation Age of Onset    Esophageal Cancer Paternal Uncle     Colon Cancer Neg Hx     Colon Polyps Neg Hx     Liver Cancer Neg Hx     Liver Disease Neg Hx     Stomach Cancer Neg Hx     Rectal Cancer Neg Hx        Social History     Tobacco Use    Smoking status: Former Smoker     Types: Cigarettes    Smokeless tobacco: Never Used   Substance Use Topics    Alcohol use: Yes     Comment: every week-end      Current Outpatient Medications   Medication Sig Dispense Refill    amphetamine-dextroamphetamine (ADDERALL XR) 25 MG extended release capsule Take 1 capsule by mouth every morning for 30 days. 30 capsule 0    clonazePAM (KLONOPIN) 0.5 MG tablet TAKE 1 TABLET BY MOUTH TWICE DAILY AS NEEDED FOR ANXIETY 45 tablet 0    norethindrone-ethinyl estradiol-Fe (LO LOESTRIN FE) 1 MG-10 MCG / 10 MCG tablet Take 1 tablet by mouth daily 28 tablet 5    butalbital-acetaminophen-caffeine (FIORICET, ESGIC) -40 MG per tablet Take 1 tablet by mouth every 6 hours as needed for Headaches 45 tablet 0    topiramate (TOPAMAX) 200 MG tablet Take 1 tablet by mouth 2 times daily 180 tablet 3    omeprazole (PRILOSEC) 20 MG delayed release capsule Take 1 capsule by mouth Daily Take first thing in the morning on an empty stomach. (Patient not taking: Reported on 10/21/2020) 30 capsule 11    promethazine (PHENERGAN) 25 MG tablet Take 25 mg by mouth every 6 hours as needed for Nausea      ondansetron (ZOFRAN-ODT) 4 MG disintegrating tablet Take 4 mg by mouth every 8 hours as needed for Nausea or Vomiting       No current facility-administered medications for this visit.       Allergies   Allergen Reactions    Iv Dye [Iodides] Itching     Itchy throat, redness    Oxaprozin     Tyloxapol     Norco [Hydrocodone-Acetaminophen] Rash    Percocet [Oxycodone-Acetaminophen] Rash       Health Maintenance   Topic Date Due    Varicella vaccine (1 of 2 - 2-dose childhood series) 11/26/1983    Cervical cancer screen  11/20/2022    DTaP/Tdap/Td vaccine (2 - Td) 07/11/2024    Colon cancer screen colonoscopy  07/23/2032    Flu vaccine  Completed    HIV screen  Completed    Hepatitis A vaccine  Aged Out    Hepatitis B vaccine  Aged Out    Hib vaccine  Aged Out    Meningococcal (ACWY) vaccine  Aged Out    Pneumococcal 0-64 years Vaccine  Aged Out       Subjective:     Review of Systems   Constitutional: Positive for activity change, appetite change and fatigue. Negative for chills, fever and unexpected weight change. HENT: Negative for congestion, rhinorrhea, sinus pressure, sneezing, sore throat, trouble swallowing and voice change. Eyes: Negative. Respiratory: Negative for apnea, cough, choking, chest tightness, shortness of breath, wheezing and stridor. Cardiovascular: Negative. Gastrointestinal: Positive for diarrhea and vomiting. Negative for abdominal pain and nausea. Endocrine: Negative. Genitourinary: Negative. Musculoskeletal: Positive for myalgias. Skin: Negative for color change. Allergic/Immunologic: Negative. Neurological: Negative. Hematological: Negative. Psychiatric/Behavioral: Negative. Objective:     Physical Exam  Vitals signs and nursing note reviewed. Constitutional:       Appearance: Normal appearance. She is well-developed. HENT:      Head: Normocephalic and atraumatic. Right Ear: Hearing, tympanic membrane, ear canal and external ear normal.      Left Ear: Hearing, tympanic membrane, ear canal and external ear normal.      Nose: Nose normal.      Mouth/Throat:      Pharynx: Uvula midline. Eyes:      Conjunctiva/sclera: Conjunctivae normal.      Pupils: Pupils are equal, round, and reactive to light. Neck:      Musculoskeletal: Normal range of motion. Thyroid: No thyroid mass or thyromegaly. Vascular: No carotid bruit. Trachea: Trachea normal.   Cardiovascular:      Rate and Rhythm: Normal rate and regular rhythm. Heart sounds: Normal heart sounds. Pulmonary:      Effort: Pulmonary effort is normal.      Breath sounds: Normal breath sounds. Abdominal:      General: Bowel sounds are increased. Palpations: Abdomen is soft. Abdomen is not rigid. Tenderness: There is no abdominal tenderness. Musculoskeletal: Normal range of motion. Skin:     General: Skin is warm and moist.      Capillary Refill: Capillary refill takes less than 2 seconds. Neurological:      Mental Status: She is alert and oriented to person, place, and time. Psychiatric:         Behavior: Behavior normal.         Thought Content: Thought content normal.         Judgment: Judgment normal.       /73   Pulse 88   Temp 98 °F (36.7 °C) (Oral)   Resp 20   Ht 5' 6\" (1.676 m)   Wt 130 lb 12.8 oz (59.3 kg)   SpO2 99%   BMI 21.11 kg/m²     Assessment:          Diagnosis Orders   1. Body aches  POCT rapid strep A    POCT Influenza A/B   2. Generalized body aches         Plan:      Orders Placed This Encounter   Procedures    POCT rapid strep A    POCT Influenza A/B        No follow-ups on file. Orders Placed This Encounter   Procedures    POCT rapid strep A    POCT Influenza A/B     No orders of the defined types were placed in this encounter. Patient given educationalmaterials - see patient instructions. Discussed use, benefit, and side effectsof prescribed medications. All patient questions answered. Pt voiced understanding. Reviewed health maintenance. Instructed to continue current medications, diet andexercise. Patient agreed with treatment plan. Follow up as directed. Patient Instructions   1. Strep and flu are negative  2. Increase fluids and rest  3. Rapid COVID test results will determine recommendations   4. Back to work tomorrow with no fever 24 hours and negative COVID  5. Follow up as needed        Electronically signed by ALVERTO Henry CNP on 10/21/2020 at 8:49 AM

## 2020-11-03 RX ORDER — NORETHINDRONE ACETATE AND ETHINYL ESTRADIOL, ETHINYL ESTRADIOL AND FERROUS FUMARATE 1MG-10(24)
1 KIT ORAL DAILY
Qty: 28 TABLET | Refills: 1 | Status: SHIPPED | OUTPATIENT
Start: 2020-11-03 | End: 2021-04-29

## 2020-11-16 ENCOUNTER — OFFICE VISIT (OUTPATIENT)
Dept: PRIMARY CARE CLINIC | Age: 40
End: 2020-11-16
Payer: COMMERCIAL

## 2020-11-16 VITALS
SYSTOLIC BLOOD PRESSURE: 104 MMHG | OXYGEN SATURATION: 97 % | RESPIRATION RATE: 16 BRPM | BODY MASS INDEX: 21.05 KG/M2 | TEMPERATURE: 96.2 F | WEIGHT: 131 LBS | HEART RATE: 65 BPM | DIASTOLIC BLOOD PRESSURE: 72 MMHG | HEIGHT: 66 IN

## 2020-11-16 PROCEDURE — 99214 OFFICE O/P EST MOD 30 MIN: CPT | Performed by: FAMILY MEDICINE

## 2020-11-16 RX ORDER — DEXTROAMPHETAMINE SACCHARATE, AMPHETAMINE ASPARTATE MONOHYDRATE, DEXTROAMPHETAMINE SULFATE AND AMPHETAMINE SULFATE 6.25; 6.25; 6.25; 6.25 MG/1; MG/1; MG/1; MG/1
25 CAPSULE, EXTENDED RELEASE ORAL EVERY MORNING
Qty: 30 CAPSULE | Refills: 0 | Status: SHIPPED | OUTPATIENT
Start: 2020-11-16 | End: 2020-12-14 | Stop reason: SDUPTHER

## 2020-11-16 ASSESSMENT — ENCOUNTER SYMPTOMS
WHEEZING: 0
COLOR CHANGE: 0
VOMITING: 0
ABDOMINAL PAIN: 0
NAUSEA: 0
DIARRHEA: 0
BACK PAIN: 0
EYE DISCHARGE: 0
COUGH: 0

## 2020-11-16 NOTE — PROGRESS NOTES
SUBJECTIVE:    Patient ID: Yassine Hansen is a 42 y.o. female. HPI:   Patient is seen today for 6-month follow-up. She states that overall she is doing well. She states that her Adderall XR is working well and the current dose is doing well. She states that it is helping with her concentration. She denies any side effects to it. She does have a history of migraines and states that she is having migraines frequently. She states she has about 2 to 3/week. She states that she is on Topamax 200 mg at bedtime. She states that she is tolerating this well. She states that she is still getting migraines. She states that she takes Fioricet whenever she gets a migraine. She has tried Imitrex and Maxalt in the past without any improvement in her symptoms. She is still struggling with nausea. She states that they have taken her off of her Zofran, Phenergan, and Prilosec because she states that she was having trouble with constipation so they want to try her off of all of her medications to see how she does. Past Medical History:   Diagnosis Date    Clotting disorder (HCC)     factor 14, 11    GERD (gastroesophageal reflux disease)     Headache       Current Outpatient Medications   Medication Sig Dispense Refill    amphetamine-dextroamphetamine (ADDERALL XR) 25 MG extended release capsule Take 1 capsule by mouth every morning for 30 days. 30 capsule 0    topiramate (TOPAMAX) 200 MG tablet TAKE ONE TABLET TWO TIMES A  tablet 3    norethindrone-ethinyl estradiol-Fe (LO LOESTRIN FE) 1 MG-10 MCG / 10 MCG tablet Take 1 tablet by mouth daily 28 tablet 1    clonazePAM (KLONOPIN) 0.5 MG tablet TAKE 1 TABLET BY MOUTH TWICE DAILY AS NEEDED FOR ANXIETY 45 tablet 0    butalbital-acetaminophen-caffeine (FIORICET, ESGIC) -40 MG per tablet Take 1 tablet by mouth every 6 hours as needed for Headaches 45 tablet 0     No current facility-administered medications for this visit.        Allergies   Allergen Reactions    Iv Dye [Iodides] Itching     Itchy throat, redness    Oxaprozin     Shrimp Flavor     Tyloxapol     Norco [Hydrocodone-Acetaminophen] Rash    Percocet [Oxycodone-Acetaminophen] Rash       Review of Systems   Constitutional: Negative for activity change, appetite change and fever. HENT: Negative for congestion and nosebleeds. Eyes: Negative for discharge. Respiratory: Negative for cough and wheezing. Cardiovascular: Negative for chest pain and leg swelling. Gastrointestinal: Negative for abdominal pain, diarrhea, nausea and vomiting. Genitourinary: Negative for difficulty urinating, frequency and urgency. Musculoskeletal: Negative for back pain and gait problem. Skin: Negative for color change and rash. Neurological: Negative for dizziness and headaches. Hematological: Does not bruise/bleed easily. Psychiatric/Behavioral: Negative for sleep disturbance and suicidal ideas. OBJECTIVE:     Physical Exam  Vitals signs reviewed. Constitutional:       General: She is not in acute distress. Appearance: Normal appearance. She is well-developed. She is not diaphoretic. HENT:      Head: Normocephalic and atraumatic. Right Ear: External ear normal.      Left Ear: External ear normal.   Neck:      Musculoskeletal: Normal range of motion and neck supple. Cardiovascular:      Rate and Rhythm: Normal rate and regular rhythm. Pulses: Normal pulses. Heart sounds: Normal heart sounds. No murmur. Pulmonary:      Effort: Pulmonary effort is normal. No respiratory distress. Breath sounds: Normal breath sounds. Skin:     General: Skin is warm and dry. Neurological:      General: No focal deficit present. Mental Status: She is alert and oriented to person, place, and time. Mental status is at baseline. Psychiatric:         Mood and Affect: Mood normal.         Behavior: Behavior normal.         Thought Content:  Thought content normal. Judgment: Judgment normal.        /72 (Site: Left Upper Arm, Position: Sitting, Cuff Size: Medium Adult)   Pulse 65   Temp 96.2 °F (35.7 °C) (Temporal)   Resp 16   Ht 5' 6\" (1.676 m)   Wt 131 lb (59.4 kg)   SpO2 97%   BMI 21.14 kg/m²      ASSESSMENT:    Luigi Chaparro was seen today for 6 month follow-up. Diagnoses and all orders for this visit:    Nausea    Attention deficit hyperactivity disorder (ADHD), unspecified ADHD type  -     amphetamine-dextroamphetamine (ADDERALL XR) 25 MG extended release capsule; Take 1 capsule by mouth every morning for 30 days. Migraine without status migrainosus, not intractable, unspecified migraine type        PLAN:    Adderall was refilled today. Jinx File was requested. Continue to follow with GI regarding her stomach issues. I have given her samples of Ubrelvy for her to try for her migraines. She is to let us know if symptoms are improving. We did discuss Aimovig or Emgality for prevention today. Patient is going to think about these and decide if she would like to try them or not. Follow-up with us in 6 months for checkup unless needed sooner. EMR Dragon/transcription disclaimer:  Much of this encounter note is electronic transcription/translation of spoken language toprinted texts. The electronic translation of spoken language may be erroneous, or at times, nonsensical words or phrases may be inadvertently transcribed.   Although I have reviewed the note for such errors, some may stillexist.

## 2020-12-14 RX ORDER — DEXTROAMPHETAMINE SACCHARATE, AMPHETAMINE ASPARTATE MONOHYDRATE, DEXTROAMPHETAMINE SULFATE AND AMPHETAMINE SULFATE 6.25; 6.25; 6.25; 6.25 MG/1; MG/1; MG/1; MG/1
25 CAPSULE, EXTENDED RELEASE ORAL EVERY MORNING
Qty: 30 CAPSULE | Refills: 0 | Status: SHIPPED | OUTPATIENT
Start: 2020-12-14 | End: 2021-01-14 | Stop reason: SDUPTHER

## 2021-01-14 DIAGNOSIS — F90.9 ATTENTION DEFICIT HYPERACTIVITY DISORDER (ADHD), UNSPECIFIED ADHD TYPE: ICD-10-CM

## 2021-01-14 RX ORDER — DEXTROAMPHETAMINE SACCHARATE, AMPHETAMINE ASPARTATE MONOHYDRATE, DEXTROAMPHETAMINE SULFATE AND AMPHETAMINE SULFATE 6.25; 6.25; 6.25; 6.25 MG/1; MG/1; MG/1; MG/1
25 CAPSULE, EXTENDED RELEASE ORAL EVERY MORNING
Qty: 30 CAPSULE | Refills: 0 | Status: SHIPPED | OUTPATIENT
Start: 2021-01-14 | End: 2021-02-14 | Stop reason: SDUPTHER

## 2021-02-14 DIAGNOSIS — F90.9 ATTENTION DEFICIT HYPERACTIVITY DISORDER (ADHD), UNSPECIFIED ADHD TYPE: ICD-10-CM

## 2021-02-15 RX ORDER — DEXTROAMPHETAMINE SACCHARATE, AMPHETAMINE ASPARTATE MONOHYDRATE, DEXTROAMPHETAMINE SULFATE AND AMPHETAMINE SULFATE 6.25; 6.25; 6.25; 6.25 MG/1; MG/1; MG/1; MG/1
25 CAPSULE, EXTENDED RELEASE ORAL EVERY MORNING
Qty: 30 CAPSULE | Refills: 0 | Status: SHIPPED | OUTPATIENT
Start: 2021-02-15 | End: 2021-03-15 | Stop reason: SDUPTHER

## 2021-03-01 ENCOUNTER — PATIENT MESSAGE (OUTPATIENT)
Dept: PRIMARY CARE CLINIC | Age: 41
End: 2021-03-01

## 2021-03-01 NOTE — TELEPHONE ENCOUNTER
From: Iglesia Moser  To: Renaldo Machado MD  Sent: 3/1/2021 8:12 AM CST  Subject: Prescription Question    I grind my teeth and now one is hurting but I don't have time off for the dentist right now. Can I get an antibiotic called in.

## 2021-03-02 RX ORDER — AMOXICILLIN 500 MG/1
500 CAPSULE ORAL 2 TIMES DAILY
Qty: 14 CAPSULE | Refills: 0 | Status: SHIPPED | OUTPATIENT
Start: 2021-03-02 | End: 2021-03-09

## 2021-03-13 DIAGNOSIS — R51.9 ACUTE NONINTRACTABLE HEADACHE, UNSPECIFIED HEADACHE TYPE: ICD-10-CM

## 2021-03-13 DIAGNOSIS — F41.9 ANXIETY: ICD-10-CM

## 2021-03-15 DIAGNOSIS — F90.9 ATTENTION DEFICIT HYPERACTIVITY DISORDER (ADHD), UNSPECIFIED ADHD TYPE: ICD-10-CM

## 2021-03-15 RX ORDER — DEXTROAMPHETAMINE SACCHARATE, AMPHETAMINE ASPARTATE MONOHYDRATE, DEXTROAMPHETAMINE SULFATE AND AMPHETAMINE SULFATE 6.25; 6.25; 6.25; 6.25 MG/1; MG/1; MG/1; MG/1
25 CAPSULE, EXTENDED RELEASE ORAL EVERY MORNING
Qty: 30 CAPSULE | Refills: 0 | Status: SHIPPED | OUTPATIENT
Start: 2021-03-15 | End: 2021-04-15 | Stop reason: SDUPTHER

## 2021-03-15 RX ORDER — BUTALBITAL, ACETAMINOPHEN AND CAFFEINE 50; 325; 40 MG/1; MG/1; MG/1
1 TABLET ORAL EVERY 6 HOURS PRN
Qty: 45 TABLET | Refills: 0 | Status: SHIPPED | OUTPATIENT
Start: 2021-03-15 | End: 2021-11-22 | Stop reason: SDUPTHER

## 2021-03-15 RX ORDER — CLONAZEPAM 0.5 MG/1
0.5 TABLET ORAL 2 TIMES DAILY PRN
Qty: 45 TABLET | Refills: 0 | Status: SHIPPED | OUTPATIENT
Start: 2021-03-15 | End: 2021-05-20 | Stop reason: SDUPTHER

## 2021-04-15 DIAGNOSIS — F90.9 ATTENTION DEFICIT HYPERACTIVITY DISORDER (ADHD), UNSPECIFIED ADHD TYPE: ICD-10-CM

## 2021-04-15 RX ORDER — DEXTROAMPHETAMINE SACCHARATE, AMPHETAMINE ASPARTATE MONOHYDRATE, DEXTROAMPHETAMINE SULFATE AND AMPHETAMINE SULFATE 6.25; 6.25; 6.25; 6.25 MG/1; MG/1; MG/1; MG/1
25 CAPSULE, EXTENDED RELEASE ORAL EVERY MORNING
Qty: 30 CAPSULE | Refills: 0 | Status: SHIPPED | OUTPATIENT
Start: 2021-04-15 | End: 2021-05-20

## 2021-05-17 ENCOUNTER — OFFICE VISIT (OUTPATIENT)
Dept: PRIMARY CARE CLINIC | Age: 41
End: 2021-05-17
Payer: COMMERCIAL

## 2021-05-17 VITALS
OXYGEN SATURATION: 99 % | TEMPERATURE: 97 F | WEIGHT: 129.5 LBS | HEIGHT: 66 IN | SYSTOLIC BLOOD PRESSURE: 110 MMHG | RESPIRATION RATE: 16 BRPM | BODY MASS INDEX: 20.81 KG/M2 | DIASTOLIC BLOOD PRESSURE: 82 MMHG | HEART RATE: 89 BPM

## 2021-05-17 DIAGNOSIS — F90.9 ATTENTION DEFICIT HYPERACTIVITY DISORDER (ADHD), UNSPECIFIED ADHD TYPE: ICD-10-CM

## 2021-05-17 DIAGNOSIS — G43.909 MIGRAINE WITHOUT STATUS MIGRAINOSUS, NOT INTRACTABLE, UNSPECIFIED MIGRAINE TYPE: Primary | ICD-10-CM

## 2021-05-17 DIAGNOSIS — F41.9 ANXIETY: ICD-10-CM

## 2021-05-17 PROCEDURE — 99214 OFFICE O/P EST MOD 30 MIN: CPT | Performed by: FAMILY MEDICINE

## 2021-05-17 RX ORDER — CLONAZEPAM 0.5 MG/1
0.5 TABLET ORAL 2 TIMES DAILY PRN
Qty: 45 TABLET | Refills: 0 | Status: CANCELLED | OUTPATIENT
Start: 2021-05-17 | End: 2021-06-16

## 2021-05-17 RX ORDER — DEXTROAMPHETAMINE SACCHARATE, AMPHETAMINE ASPARTATE MONOHYDRATE, DEXTROAMPHETAMINE SULFATE AND AMPHETAMINE SULFATE 6.25; 6.25; 6.25; 6.25 MG/1; MG/1; MG/1; MG/1
25 CAPSULE, EXTENDED RELEASE ORAL EVERY MORNING
Qty: 30 CAPSULE | Refills: 0 | Status: CANCELLED | OUTPATIENT
Start: 2021-05-17 | End: 2021-06-16

## 2021-05-17 RX ORDER — PAROXETINE 10 MG/1
10 TABLET, FILM COATED ORAL DAILY
Qty: 30 TABLET | Refills: 3 | Status: CANCELLED | OUTPATIENT
Start: 2021-05-17

## 2021-05-17 ASSESSMENT — PATIENT HEALTH QUESTIONNAIRE - PHQ9
1. LITTLE INTEREST OR PLEASURE IN DOING THINGS: 1
SUM OF ALL RESPONSES TO PHQ QUESTIONS 1-9: 2
2. FEELING DOWN, DEPRESSED OR HOPELESS: 1
SUM OF ALL RESPONSES TO PHQ9 QUESTIONS 1 & 2: 2

## 2021-05-20 ENCOUNTER — PATIENT MESSAGE (OUTPATIENT)
Dept: PRIMARY CARE CLINIC | Age: 41
End: 2021-05-20

## 2021-05-20 DIAGNOSIS — F41.9 ANXIETY: ICD-10-CM

## 2021-05-20 DIAGNOSIS — F90.9 ATTENTION DEFICIT HYPERACTIVITY DISORDER (ADHD), UNSPECIFIED ADHD TYPE: ICD-10-CM

## 2021-05-20 RX ORDER — DEXTROAMPHETAMINE SACCHARATE, AMPHETAMINE ASPARTATE MONOHYDRATE, DEXTROAMPHETAMINE SULFATE AND AMPHETAMINE SULFATE 6.25; 6.25; 6.25; 6.25 MG/1; MG/1; MG/1; MG/1
25 CAPSULE, EXTENDED RELEASE ORAL EVERY MORNING
Qty: 30 CAPSULE | Refills: 0 | Status: SHIPPED | OUTPATIENT
Start: 2021-05-20 | End: 2021-06-28

## 2021-05-20 RX ORDER — CLONAZEPAM 0.5 MG/1
0.5 TABLET ORAL 2 TIMES DAILY PRN
Qty: 45 TABLET | Refills: 0 | Status: SHIPPED | OUTPATIENT
Start: 2021-05-20 | End: 2021-07-26 | Stop reason: SDUPTHER

## 2021-05-20 RX ORDER — PAROXETINE 10 MG/1
10 TABLET, FILM COATED ORAL DAILY
Qty: 30 TABLET | Refills: 3 | Status: SHIPPED | OUTPATIENT
Start: 2021-05-20 | End: 2022-03-02 | Stop reason: ALTCHOICE

## 2021-05-20 NOTE — TELEPHONE ENCOUNTER
From: Bharath Haile  To:  Graceann Brunner, MD  Sent: 5/20/2021 3:47 PM CDT  Subject: Prescription Question    The pharmacy didn't receive a prescription for the Paxil 10mg I thought we were gonna try

## 2021-05-25 ASSESSMENT — ENCOUNTER SYMPTOMS
NAUSEA: 0
WHEEZING: 0
COUGH: 0
BACK PAIN: 0
VOMITING: 0
EYE DISCHARGE: 0
COLOR CHANGE: 0
DIARRHEA: 0
ABDOMINAL PAIN: 0

## 2021-05-25 NOTE — PROGRESS NOTES
SUBJECTIVE:    Patient ID: Marisela Owen is a 47 y.o. female. HPI:   Patient is seen today for 6-month follow-up. She states that she wants to talk about the dose of her Adderall. She feels like the dose may need to be increased slightly. However she is having a lot of anxiety. She states that she is just very down and is stressed. She states that she is going through divorce and is having a custody issue with her ex. She states that she stays anxious most of the time. She states that she just feels irritable and edgy. She states that she is having some panic attacks. She states that she takes the Klonopin as needed but feels like she may need something on a daily basis that she can take to help with anxiety. She states that she does take the Klonopin as needed but is having to rely on it a little bit more because of anxiety. She states that she is just struggling day-to-day with anxiety. She states that she also feels down. She states she has not had any suicidal thoughts or ideation. Past Medical History:   Diagnosis Date    Clotting disorder ([facility] [address]     factor 14, 11    GERD (gastroesophageal reflux disease)     Headache       Current Outpatient Medications on File Prior to Visit   Medication Sig Dispense Refill    LO LOESTRIN FE 1 MG-10 MCG / 10 MCG tablet TAKE 1 TABLET BY MOUTH ONCE DAILY 28 tablet 3    butalbital-acetaminophen-caffeine (FIORICET, ESGIC) -40 MG per tablet Take 1 tablet by mouth every 6 hours as needed for Headaches 45 tablet 0    topiramate (TOPAMAX) 200 MG tablet TAKE ONE TABLET TWO TIMES A  tablet 3     No current facility-administered medications on file prior to visit.       Allergies   Allergen Reactions    Iv Dye [Iodides] Itching     Itchy throat, redness    Oxaprozin     Shrimp Flavor     Tyloxapol     Norco [Hydrocodone-Acetaminophen] Rash    Percocet [Oxycodone-Acetaminophen] Rash       Review of Systems   Constitutional: Negative for activity change, appetite change and fever. HENT: Negative for congestion and nosebleeds. Eyes: Negative for discharge. Respiratory: Negative for cough and wheezing. Cardiovascular: Negative for chest pain and leg swelling. Gastrointestinal: Negative for abdominal pain, diarrhea, nausea and vomiting. Genitourinary: Negative for difficulty urinating, frequency and urgency. Musculoskeletal: Negative for back pain and gait problem. Skin: Negative for color change and rash. Neurological: Negative for dizziness and headaches. Hematological: Does not bruise/bleed easily. Psychiatric/Behavioral: Positive for agitation and decreased concentration. Negative for sleep disturbance and suicidal ideas. The patient is nervous/anxious. OBJECTIVE:    Physical Exam  Vitals reviewed. Constitutional:       General: She is not in acute distress. Appearance: Normal appearance. She is well-developed. She is not diaphoretic. HENT:      Head: Normocephalic and atraumatic. Right Ear: External ear normal.      Left Ear: External ear normal.   Cardiovascular:      Rate and Rhythm: Normal rate and regular rhythm. Pulses: Normal pulses. Heart sounds: Normal heart sounds. No murmur heard. Pulmonary:      Effort: Pulmonary effort is normal. No respiratory distress. Breath sounds: Normal breath sounds. Musculoskeletal:      Cervical back: Normal range of motion and neck supple. Skin:     General: Skin is warm and dry. Neurological:      General: No focal deficit present. Mental Status: She is alert and oriented to person, place, and time. Mental status is at baseline. Psychiatric:         Mood and Affect: Mood normal.         Behavior: Behavior normal.         Thought Content:  Thought content normal.         Judgment: Judgment normal.        /82 (Site: Left Upper Arm, Position: Sitting, Cuff Size: Medium Adult)   Pulse 89   Temp 97 °F (36.1 °C) (Temporal)   Resp 16   Ht 5' 6\" (1.676 m)   Wt 129 lb 8 oz (58.7 kg)   SpO2 99%   BMI 20.90 kg/m²      ASSESSMENT:    Rogerio Walsh was seen today for 6 month follow-up and discuss medications. Diagnoses and all orders for this visit:    Migraine without status migrainosus, not intractable, unspecified migraine type    Anxiety    Attention deficit hyperactivity disorder (ADHD), unspecified ADHD type        PLAN:    Adderall XR was sent to the pharmacy. Continue current dose. We have refilled her Klonopin today as well. We are going to start her on Paxil to see if this will help with her daily anxiety. If her symptoms or not getting better she is to let us know. I would like to see her back in 4 to 6 weeks for recheck unless needed sooner. EMR Dragon/transcription disclaimer:  Much of this encounter note is electronic transcription/translation of spoken language toprinted texts. The electronic translation of spoken language may be erroneous, or at times, nonsensical words or phrases may be inadvertently transcribed.   Although I have reviewed the note for such errors, some may stillexist.

## 2021-06-28 ENCOUNTER — OFFICE VISIT (OUTPATIENT)
Dept: PRIMARY CARE CLINIC | Age: 41
End: 2021-06-28
Payer: COMMERCIAL

## 2021-06-28 VITALS
SYSTOLIC BLOOD PRESSURE: 110 MMHG | TEMPERATURE: 96.6 F | HEIGHT: 66 IN | BODY MASS INDEX: 21.38 KG/M2 | RESPIRATION RATE: 16 BRPM | WEIGHT: 133 LBS | DIASTOLIC BLOOD PRESSURE: 76 MMHG | OXYGEN SATURATION: 99 % | HEART RATE: 89 BPM

## 2021-06-28 DIAGNOSIS — F41.9 ANXIETY: Primary | ICD-10-CM

## 2021-06-28 DIAGNOSIS — G43.909 MIGRAINE WITHOUT STATUS MIGRAINOSUS, NOT INTRACTABLE, UNSPECIFIED MIGRAINE TYPE: ICD-10-CM

## 2021-06-28 DIAGNOSIS — F90.9 ATTENTION DEFICIT HYPERACTIVITY DISORDER (ADHD), UNSPECIFIED ADHD TYPE: ICD-10-CM

## 2021-06-28 PROCEDURE — 99214 OFFICE O/P EST MOD 30 MIN: CPT | Performed by: FAMILY MEDICINE

## 2021-06-28 RX ORDER — DEXTROAMPHETAMINE SACCHARATE, AMPHETAMINE ASPARTATE MONOHYDRATE, DEXTROAMPHETAMINE SULFATE AND AMPHETAMINE SULFATE 6.25; 6.25; 6.25; 6.25 MG/1; MG/1; MG/1; MG/1
25 CAPSULE, EXTENDED RELEASE ORAL EVERY MORNING
Qty: 30 CAPSULE | Refills: 0 | Status: CANCELLED | OUTPATIENT
Start: 2021-06-28 | End: 2021-07-28

## 2021-06-28 RX ORDER — DEXTROAMPHETAMINE SACCHARATE, AMPHETAMINE ASPARTATE MONOHYDRATE, DEXTROAMPHETAMINE SULFATE AND AMPHETAMINE SULFATE 7.5; 7.5; 7.5; 7.5 MG/1; MG/1; MG/1; MG/1
30 CAPSULE, EXTENDED RELEASE ORAL DAILY
Qty: 30 CAPSULE | Refills: 0 | Status: SHIPPED | OUTPATIENT
Start: 2021-06-28 | End: 2021-07-26 | Stop reason: SDUPTHER

## 2021-06-28 ASSESSMENT — ENCOUNTER SYMPTOMS
NAUSEA: 0
BACK PAIN: 0
EYE DISCHARGE: 0
WHEEZING: 0
DIARRHEA: 0
VOMITING: 0
COLOR CHANGE: 0
COUGH: 0
ABDOMINAL PAIN: 0

## 2021-06-28 NOTE — PROGRESS NOTES
SUBJECTIVE:    Patient ID: Corie Quinteros is a 47 y.o. female. HPI:   Patient is seen today for a follow-up on her anxiety and ADHD. She states that she is taking her Paxil 10 mg every other day. She states that she is tolerating it better. She states that it is causing her to be tired and have a headache. She states that she has been on multiple other antidepressants in the past and they did the same thing. She states that she does feel like it helps some with the baseline anxiety. She would like to continue it for now. She does not feel like it needs to be changed at this point. She is on Adderall XR and states that she does feel like the dose could be increased on this slightly. She states that she is staying tired a lot. She has been out of this for a few days and would like a refill on it. She is also on Klonopin as needed for anxiety. She states that she does feel like this is helping. She would like to continue current dose. She is still struggling with migraines. She states that she is on Topamax has been on this since she was 18. She is on Fioricet as well. She states that she is having headaches almost daily or every other day. We did try Ubrelvy and she states that it did help some but did not help significantly. She would like to try something to help with her symptoms    Past Medical History:   Diagnosis Date    Clotting disorder ([facility])     factor 14, 11    GERD (gastroesophageal reflux disease)     Headache       Current Outpatient Medications on File Prior to Visit   Medication Sig Dispense Refill    clonazePAM (KLONOPIN) 0.5 MG tablet Take 1 tablet by mouth 2 times daily as needed for Anxiety for up to 30 days.  45 tablet 0    PARoxetine (PAXIL) 10 MG tablet Take 1 tablet by mouth daily (Patient taking differently: Take 10 mg by mouth daily Pt states she is taking this every other day) 30 tablet 3    LO LOESTRIN FE 1 MG-10 MCG / 10 MCG tablet TAKE 1 TABLET BY MOUTH ONCE DAILY 28 tablet 3    butalbital-acetaminophen-caffeine (FIORICET, ESGIC) -40 MG per tablet Take 1 tablet by mouth every 6 hours as needed for Headaches 45 tablet 0    topiramate (TOPAMAX) 200 MG tablet TAKE ONE TABLET TWO TIMES A  tablet 3     No current facility-administered medications on file prior to visit. Allergies   Allergen Reactions    Iv Dye [Iodides] Itching     Itchy throat, redness    Oxaprozin     Shrimp Flavor     Tyloxapol     Norco [Hydrocodone-Acetaminophen] Rash    Percocet [Oxycodone-Acetaminophen] Rash       Review of Systems   Constitutional: Positive for fatigue. Negative for activity change, appetite change and fever. HENT: Negative for congestion and nosebleeds. Eyes: Negative for discharge. Respiratory: Negative for cough and wheezing. Cardiovascular: Negative for chest pain and leg swelling. Gastrointestinal: Negative for abdominal pain, diarrhea, nausea and vomiting. Genitourinary: Negative for difficulty urinating, frequency and urgency. Musculoskeletal: Negative for back pain and gait problem. Skin: Negative for color change and rash. Neurological: Negative for dizziness and headaches. Hematological: Does not bruise/bleed easily. Psychiatric/Behavioral: Negative for sleep disturbance and suicidal ideas. The patient is nervous/anxious. OBJECTIVE:    Physical Exam  Vitals reviewed. Constitutional:       General: She is not in acute distress. Appearance: Normal appearance. She is well-developed. She is not diaphoretic. HENT:      Head: Normocephalic and atraumatic. Right Ear: External ear normal.      Left Ear: External ear normal.   Cardiovascular:      Rate and Rhythm: Normal rate and regular rhythm. Pulses: Normal pulses. Heart sounds: Normal heart sounds. No murmur heard. Pulmonary:      Effort: Pulmonary effort is normal. No respiratory distress. Breath sounds: Normal breath sounds. Musculoskeletal:      Cervical back: Normal range of motion and neck supple. Skin:     General: Skin is warm and dry. Neurological:      General: No focal deficit present. Mental Status: She is alert and oriented to person, place, and time. Mental status is at baseline. Psychiatric:         Mood and Affect: Mood normal.         Behavior: Behavior normal.         Thought Content: Thought content normal.         Judgment: Judgment normal.        /76 (Site: Right Upper Arm, Position: Sitting, Cuff Size: Medium Adult)   Pulse 89   Temp 96.6 °F (35.9 °C) (Temporal)   Resp 16   Ht 5' 6\" (1.676 m)   Wt 133 lb (60.3 kg)   SpO2 99%   BMI 21.47 kg/m²      ASSESSMENT:    Lili Leigh was seen today for follow-up and headache. Diagnoses and all orders for this visit:    Anxiety    Attention deficit hyperactivity disorder (ADHD), unspecified ADHD type  -     amphetamine-dextroamphetamine (ADDERALL XR) 30 MG extended release capsule; Take 1 capsule by mouth daily for 30 days. Migraine without status migrainosus, not intractable, unspecified migraine type        PLAN:    Adderall XR was increased to 30 mg. Follow-up with us in 3 months for a checkup unless needed sooner. I did give her Nurtec samples today to try for migraines. If this helps then discussed we could potentially do every other day dosing for prevention of her migraines. Continue Topamax and Fioricet for now for migraines. Follow-up with us again in 3 months unless needed sooner. EMR Dragon/transcription disclaimer:  Much of this encounter note is electronic transcription/translation of spoken language toprinted texts. The electronic translation of spoken language may be erroneous, or at times, nonsensical words or phrases may be inadvertently transcribed.   Although I have reviewed the note for such errors, some may stillexist.

## 2021-07-26 DIAGNOSIS — F41.9 ANXIETY: ICD-10-CM

## 2021-07-26 DIAGNOSIS — F90.9 ATTENTION DEFICIT HYPERACTIVITY DISORDER (ADHD), UNSPECIFIED ADHD TYPE: ICD-10-CM

## 2021-07-26 RX ORDER — CLONAZEPAM 0.5 MG/1
0.5 TABLET ORAL 2 TIMES DAILY PRN
Qty: 45 TABLET | Refills: 0 | Status: SHIPPED | OUTPATIENT
Start: 2021-07-26 | End: 2021-08-26 | Stop reason: SDUPTHER

## 2021-07-26 RX ORDER — DEXTROAMPHETAMINE SACCHARATE, AMPHETAMINE ASPARTATE MONOHYDRATE, DEXTROAMPHETAMINE SULFATE AND AMPHETAMINE SULFATE 7.5; 7.5; 7.5; 7.5 MG/1; MG/1; MG/1; MG/1
30 CAPSULE, EXTENDED RELEASE ORAL DAILY
Qty: 30 CAPSULE | Refills: 0 | Status: SHIPPED | OUTPATIENT
Start: 2021-07-26 | End: 2021-08-26 | Stop reason: SDUPTHER

## 2021-08-26 DIAGNOSIS — F41.9 ANXIETY: ICD-10-CM

## 2021-08-26 DIAGNOSIS — F90.9 ATTENTION DEFICIT HYPERACTIVITY DISORDER (ADHD), UNSPECIFIED ADHD TYPE: ICD-10-CM

## 2021-08-26 RX ORDER — DEXTROAMPHETAMINE SACCHARATE, AMPHETAMINE ASPARTATE MONOHYDRATE, DEXTROAMPHETAMINE SULFATE AND AMPHETAMINE SULFATE 7.5; 7.5; 7.5; 7.5 MG/1; MG/1; MG/1; MG/1
30 CAPSULE, EXTENDED RELEASE ORAL DAILY
Qty: 30 CAPSULE | Refills: 0 | Status: SHIPPED | OUTPATIENT
Start: 2021-08-26 | End: 2021-09-27 | Stop reason: SDUPTHER

## 2021-08-26 RX ORDER — CLONAZEPAM 0.5 MG/1
0.5 TABLET ORAL 2 TIMES DAILY PRN
Qty: 45 TABLET | Refills: 0 | Status: SHIPPED | OUTPATIENT
Start: 2021-08-26 | End: 2021-10-24 | Stop reason: SDUPTHER

## 2021-09-17 ENCOUNTER — PATIENT MESSAGE (OUTPATIENT)
Dept: PRIMARY CARE CLINIC | Age: 41
End: 2021-09-17

## 2021-09-17 RX ORDER — RIMEGEPANT SULFATE 75 MG/75MG
75 TABLET, ORALLY DISINTEGRATING ORAL DAILY PRN
Qty: 30 TABLET | Refills: 2 | Status: SHIPPED | OUTPATIENT
Start: 2021-09-17 | End: 2022-03-02 | Stop reason: SINTOL

## 2021-09-17 NOTE — TELEPHONE ENCOUNTER
From: Yuliet Johansen  To:  Jim Monzon MD  Sent: 9/17/2021 7:27 AM CDT  Subject: Prescription Question    Can I get something called in for a migraine the fiorcet isn't touching it

## 2021-09-27 ENCOUNTER — OFFICE VISIT (OUTPATIENT)
Dept: PRIMARY CARE CLINIC | Age: 41
End: 2021-09-27
Payer: COMMERCIAL

## 2021-09-27 VITALS
DIASTOLIC BLOOD PRESSURE: 80 MMHG | SYSTOLIC BLOOD PRESSURE: 120 MMHG | HEART RATE: 82 BPM | WEIGHT: 137 LBS | HEIGHT: 66 IN | OXYGEN SATURATION: 99 % | BODY MASS INDEX: 22.02 KG/M2 | TEMPERATURE: 97.4 F

## 2021-09-27 DIAGNOSIS — G43.909 MIGRAINE WITHOUT STATUS MIGRAINOSUS, NOT INTRACTABLE, UNSPECIFIED MIGRAINE TYPE: ICD-10-CM

## 2021-09-27 DIAGNOSIS — F90.9 ATTENTION DEFICIT HYPERACTIVITY DISORDER (ADHD), UNSPECIFIED ADHD TYPE: ICD-10-CM

## 2021-09-27 DIAGNOSIS — F41.9 ANXIETY: Primary | ICD-10-CM

## 2021-09-27 PROCEDURE — 99214 OFFICE O/P EST MOD 30 MIN: CPT | Performed by: FAMILY MEDICINE

## 2021-09-27 RX ORDER — DEXTROAMPHETAMINE SACCHARATE, AMPHETAMINE ASPARTATE MONOHYDRATE, DEXTROAMPHETAMINE SULFATE AND AMPHETAMINE SULFATE 7.5; 7.5; 7.5; 7.5 MG/1; MG/1; MG/1; MG/1
30 CAPSULE, EXTENDED RELEASE ORAL DAILY
Qty: 30 CAPSULE | Refills: 0 | Status: SHIPPED | OUTPATIENT
Start: 2021-09-27 | End: 2021-10-26 | Stop reason: SDUPTHER

## 2021-09-27 RX ORDER — GALCANEZUMAB 120 MG/ML
120 INJECTION, SOLUTION SUBCUTANEOUS
Qty: 1 ML | Refills: 5 | Status: SHIPPED | OUTPATIENT
Start: 2021-09-27 | End: 2021-11-24 | Stop reason: SDUPTHER

## 2021-09-27 ASSESSMENT — ENCOUNTER SYMPTOMS
EYE DISCHARGE: 0
DIARRHEA: 0
COUGH: 0
NAUSEA: 0
ABDOMINAL PAIN: 0
WHEEZING: 0
BACK PAIN: 0
COLOR CHANGE: 0
VOMITING: 0

## 2021-09-27 NOTE — PROGRESS NOTES
SUBJECTIVE:    Patient ID: Siddharth Mcgowan is a 47 y.o. female. HPI:   Patient is seen today for 3-month follow-up on her ADHD. She states that the Adderall XR is working well for her and she would like to continue the current dose. She states that she is tolerating it well denies any side effects to it. She states that she would like to try Emgality for her migraines. She is still having them very frequently. She states that insurance has not covered the Nurtec and she does not member she got samples of it or not. She did try Brittni Magana previously and she states that it did not work well for her. She states that she does have Fioricet at home to take when she needs it. She states that she feels like the anxiety has improved significantly. She is taking Paxil every other day. She states that she also takes Klonopin as needed for anxiety. She states that both of these medications are helping keep her anxiety controlled. Past Medical History:   Diagnosis Date    ADHD (attention deficit hyperactivity disorder)     Clotting disorder ([facility])     factor 14, 11    GERD (gastroesophageal reflux disease)     Headache       Current Outpatient Medications on File Prior to Visit   Medication Sig Dispense Refill    Rimegepant Sulfate (NURTEC) 75 MG TBDP Take 75 mg by mouth daily as needed (migraine) 30 tablet 2    clonazePAM (KLONOPIN) 0.5 MG tablet Take 1 tablet by mouth 2 times daily as needed for Anxiety for up to 30 days.  45 tablet 0    LO LOESTRIN FE 1 MG-10 MCG / 10 MCG tablet TAKE 1 TABLET BY MOUTH ONCE DAILY 28 tablet 3    PARoxetine (PAXIL) 10 MG tablet Take 1 tablet by mouth daily (Patient taking differently: Take 10 mg by mouth daily Pt states she is taking this every other day) 30 tablet 3    butalbital-acetaminophen-caffeine (FIORICET, ESGIC) -40 MG per tablet Take 1 tablet by mouth every 6 hours as needed for Headaches 45 tablet 0    topiramate (TOPAMAX) 200 MG tablet TAKE ONE TABLET TWO TIMES A  tablet 3     No current facility-administered medications on file prior to visit. Allergies   Allergen Reactions    Iv Dye [Iodides] Itching     Itchy throat, redness    Oxaprozin     Shrimp Flavor     Tyloxapol     Norco [Hydrocodone-Acetaminophen] Rash    Percocet [Oxycodone-Acetaminophen] Rash       Review of Systems   Constitutional: Negative for activity change, appetite change and fever. HENT: Negative for congestion and nosebleeds. Eyes: Negative for discharge. Respiratory: Negative for cough and wheezing. Cardiovascular: Negative for chest pain and leg swelling. Gastrointestinal: Negative for abdominal pain, diarrhea, nausea and vomiting. Genitourinary: Negative for difficulty urinating, frequency and urgency. Musculoskeletal: Negative for back pain and gait problem. Skin: Negative for color change and rash. Neurological: Negative for dizziness and headaches. Hematological: Does not bruise/bleed easily. Psychiatric/Behavioral: Negative for sleep disturbance and suicidal ideas. OBJECTIVE:    Physical Exam  Vitals reviewed. Constitutional:       General: She is not in acute distress. Appearance: Normal appearance. She is well-developed. She is not diaphoretic. HENT:      Head: Normocephalic and atraumatic. Right Ear: External ear normal.      Left Ear: External ear normal.   Cardiovascular:      Rate and Rhythm: Normal rate and regular rhythm. Pulses: Normal pulses. Heart sounds: Normal heart sounds. No murmur heard. Pulmonary:      Effort: Pulmonary effort is normal. No respiratory distress. Breath sounds: Normal breath sounds. Musculoskeletal:      Cervical back: Normal range of motion and neck supple. Skin:     General: Skin is warm and dry. Neurological:      General: No focal deficit present. Mental Status: She is alert and oriented to person, place, and time. Mental status is at baseline.    Psychiatric: Mood and Affect: Mood normal.         Behavior: Behavior normal.         Thought Content: Thought content normal.         Judgment: Judgment normal.        /80   Pulse 82   Temp 97.4 °F (36.3 °C)   Ht 5' 6\" (1.676 m)   Wt 137 lb (62.1 kg)   SpO2 99%   BMI 22.11 kg/m²      ASSESSMENT/PLAN:    Demetri Abraham was seen today for follow-up. Diagnoses and all orders for this visit:    Anxiety    Attention deficit hyperactivity disorder (ADHD), unspecified ADHD type  -     amphetamine-dextroamphetamine (ADDERALL XR) 30 MG extended release capsule; Take 1 capsule by mouth daily for 30 days. Migraine without status migrainosus, not intractable, unspecified migraine type    Other orders  -     Galcanezumab-gnlm (EMGALITY) 120 MG/ML SOAJ; Inject 120 mg into the skin every 30 days          Emgality was sent to the pharmacy. Adderall was refilled today. Continue Nurtec. I given her samples today. Follow-up with us in 3 months for checkup unless needed sooner. She is to let me know if she feels like the migraine medications are not working. Continue Klonopin and Paxil for anxiety. EMR Dragon/transcription disclaimer:  Much of this encounter note is electronic transcription/translation of spoken language toprinted texts. The electronic translation of spoken language may be erroneous, or at times, nonsensical words or phrases may be inadvertently transcribed.   Although I have reviewed the note for such errors, some may stillexist.

## 2021-09-29 ENCOUNTER — OFFICE VISIT (OUTPATIENT)
Dept: URGENT CARE | Age: 41
End: 2021-09-29
Payer: COMMERCIAL

## 2021-09-29 VITALS
OXYGEN SATURATION: 97 % | TEMPERATURE: 97.7 F | HEART RATE: 91 BPM | HEIGHT: 67 IN | SYSTOLIC BLOOD PRESSURE: 102 MMHG | DIASTOLIC BLOOD PRESSURE: 72 MMHG | BODY MASS INDEX: 21.63 KG/M2 | WEIGHT: 137.8 LBS

## 2021-09-29 DIAGNOSIS — J02.9 SORE THROAT: Primary | ICD-10-CM

## 2021-09-29 DIAGNOSIS — R09.89 RUNNY NOSE: ICD-10-CM

## 2021-09-29 DIAGNOSIS — R05.9 COUGH: ICD-10-CM

## 2021-09-29 LAB — S PYO AG THROAT QL: NORMAL

## 2021-09-29 PROCEDURE — 99213 OFFICE O/P EST LOW 20 MIN: CPT | Performed by: NURSE PRACTITIONER

## 2021-09-29 PROCEDURE — 87880 STREP A ASSAY W/OPTIC: CPT | Performed by: NURSE PRACTITIONER

## 2021-09-29 RX ORDER — METHYLPREDNISOLONE 4 MG/1
TABLET ORAL
Qty: 1 KIT | Refills: 0 | Status: SHIPPED | OUTPATIENT
Start: 2021-09-29 | End: 2021-12-20

## 2021-09-29 ASSESSMENT — ENCOUNTER SYMPTOMS
SORE THROAT: 1
EYES NEGATIVE: 1
ALLERGIC/IMMUNOLOGIC NEGATIVE: 1
RHINORRHEA: 1
COUGH: 1
GASTROINTESTINAL NEGATIVE: 1

## 2021-09-29 NOTE — PROGRESS NOTES
[address] [facility] URGENT CARE  [address] [address] [place] [address]  Dept: [phone]  Dept Fax: [phone]  Loc: [phone]    Timothy Owens is a 47 y.o. female who presents today for her medical conditions/complaints as noted below. Timothy Owens is c/o of Pharyngitis, Cough, Head Congestion, and Chest Congestion (chest tightness )        HPI:     HPI   Chief Complaint   Patient presents with    Pharyngitis    Cough    Head Congestion    Chest Congestion     chest tightness    She reports sore throat, cough, head and chest congestion and constant runny nose since Sunday. States she has taken Claritin with no relief. Took Tylenol or Ibuprofen only when she has a headache. Has not been COVID vaccinated. States she has a \"clotting disorder\".    Past Medical History:   Diagnosis Date    ADHD (attention deficit hyperactivity disorder)     Clotting disorder (HCC)     factor 14, 11    GERD (gastroesophageal reflux disease)     Headache       Past Surgical History:   Procedure Laterality Date    CHOLECYSTECTOMY      COLONOSCOPY  2005    \"NORMAL\" PER PT RECALL    COLONOSCOPY  2019    [place]- \" normal\" per pt recall    [place] COLONOSCOPY FLX DX W/COLLJ SPEC WHEN PFRMD N/A 7/23/2018    Dr Yanni Sky, 15 yr (age 50) recall    [place] EGD TRANSORAL BIOPSY SINGLE/MULTIPLE N/A 7/23/2018    Dr Donte Montemayor (-) Cathy, (+) Barrettt's, (-) dysplasia--1st dx--1 yr recall   [facility] ENDOSCOPY  2005    \"NORMAL\" PER PT RECALL    UPPER GASTROINTESTINAL ENDOSCOPY  2019    [place], [place]- \"normal\" per pt recall    UPPER GASTROINTESTINAL ENDOSCOPY N/A 9/30/2020    Dr Estrellita Wilson exam,  NEG EoE, Neg Barretts, no recall       Vitals 9/29/2021 9/27/2021 6/28/2021 5/17/2021 11/16/2020 55/02/7875   SYSTOLIC 478 366 169 174 460 204   DIASTOLIC 72 80 76 82 72 73   Site - - Right Upper Arm Left Upper Arm Left Upper Arm -   Position - - Sitting Sitting Sitting -   Cuff Size - - Medium Adult Medium Adult Medium Adult -   Pulse 91 82 89 89 65 88   Temp 97.7 97.4 96.6 97 96.2 98   Resp - - 16 16 16 20   SpO2 97 99 99 99 97 99   Weight 137 lb 12.8 oz 137 lb 133 lb 129 lb 8 oz 131 lb 130 lb 12.8 oz   Height 5' 7\" 5' 6\" 5' 6\" 5' 6\" 5' 6\" 5' 6\"   Body mass index 21.58 kg/m2 22.11 kg/m2 21.46 kg/m2 20.9 kg/m2 21.14 kg/m2 21.11 kg/m2   Some recent data might be hidden       Family History   Problem Relation Age of Onset    Esophageal Cancer Paternal Uncle     Colon Cancer Neg Hx     Colon Polyps Neg Hx     Liver Cancer Neg Hx     Liver Disease Neg Hx     Stomach Cancer Neg Hx     Rectal Cancer Neg Hx        Social History     Tobacco Use    Smoking status: Former Smoker     Types: Cigarettes    Smokeless tobacco: Never Used   Substance Use Topics    Alcohol use: Yes     Comment: every week-end      Current Outpatient Medications on File Prior to Visit   Medication Sig Dispense Refill    amphetamine-dextroamphetamine (ADDERALL XR) 30 MG extended release capsule Take 1 capsule by mouth daily for 30 days. 30 capsule 0    Galcanezumab-gnlm (EMGALITY) 120 MG/ML SOAJ Inject 120 mg into the skin every 30 days 1 mL 5    Rimegepant Sulfate (NURTEC) 75 MG TBDP Take 75 mg by mouth daily as needed (migraine) 30 tablet 2    LO LOESTRIN FE 1 MG-10 MCG / 10 MCG tablet TAKE 1 TABLET BY MOUTH ONCE DAILY 28 tablet 3    PARoxetine (PAXIL) 10 MG tablet Take 1 tablet by mouth daily (Patient taking differently: Take 10 mg by mouth daily Pt states she is taking this every other day) 30 tablet 3    butalbital-acetaminophen-caffeine (FIORICET, ESGIC) -40 MG per tablet Take 1 tablet by mouth every 6 hours as needed for Headaches 45 tablet 0    topiramate (TOPAMAX) 200 MG tablet TAKE ONE TABLET TWO TIMES A  tablet 3    clonazePAM (KLONOPIN) 0.5 MG tablet Take 1 tablet by mouth 2 times daily as needed for Anxiety for up to 30 days.  45 tablet 0     No current facility-administered medications on file prior to visit. Allergies   Allergen Reactions    Iv Dye [Iodides] Itching     Itchy throat, redness    Red Dye Swelling     Throat swells    Shellfish-Derived Products Swelling     tongue and throat swells    Oxaprozin     Shrimp Flavor     Tyloxapol     Norco [Hydrocodone-Acetaminophen] Rash    Percocet [Oxycodone-Acetaminophen] Rash       Health Maintenance   Topic Date Due    Hepatitis C screen  Never done    Varicella vaccine (1 of 2 - 2-dose childhood series) Never done    COVID-19 Vaccine (1) Never done    Flu vaccine (1) 09/01/2021    Cervical cancer screen  11/20/2022    DTaP/Tdap/Td vaccine (2 - Td or Tdap) 07/11/2024    Colon cancer screen colonoscopy  07/23/2032    HIV screen  Completed    Hepatitis A vaccine  Aged Out    Hepatitis B vaccine  Aged Out    Hib vaccine  Aged Out    Meningococcal (ACWY) vaccine  Aged Out    Pneumococcal 0-64 years Vaccine  Aged Out       Subjective:      Review of Systems   Constitutional: Negative. HENT: Positive for congestion, rhinorrhea and sore throat. Eyes: Negative. Respiratory: Positive for cough. Cardiovascular: Negative. Gastrointestinal: Negative. Endocrine: Negative. Genitourinary: Negative. Musculoskeletal: Negative. Skin: Negative. Allergic/Immunologic: Negative. Neurological: Positive for headaches. Hematological: Negative. Psychiatric/Behavioral: Negative. Objective:     Physical Exam  Vitals reviewed. Constitutional:       General: She is not in acute distress. Appearance: Normal appearance. She is not diaphoretic. HENT:      Head: Normocephalic. Right Ear: Tympanic membrane normal.      Left Ear: Tympanic membrane normal.      Nose: Rhinorrhea present. Mouth/Throat:      Mouth: Mucous membranes are moist.      Pharynx: No oropharyngeal exudate or posterior oropharyngeal erythema.    Eyes:      General:         Right eye: No discharge. Left eye: No discharge. Cardiovascular:      Rate and Rhythm: Regular rhythm. Tachycardia present. Pulses: Normal pulses. Heart sounds: Normal heart sounds. Pulmonary:      Effort: Pulmonary effort is normal.      Breath sounds: Normal breath sounds. No wheezing or rhonchi. Musculoskeletal:         General: Normal range of motion. Cervical back: Normal range of motion and neck supple. Skin:     General: Skin is warm and dry. Neurological:      Mental Status: She is alert and oriented to person, place, and time. Psychiatric:         Mood and Affect: Mood normal.         Behavior: Behavior normal.       /72   Pulse 91   Temp 97.7 °F (36.5 °C)   Ht 5' 7\" (1.702 m)   Wt 137 lb 12.8 oz (62.5 kg)   LMP 09/14/2016 (Approximate) Comment: clotting disorder  SpO2 97%   Breastfeeding No   BMI 21.58 kg/m²     Assessment:       Diagnosis Orders   1. Sore throat  POCT rapid strep A   2. Cough     3. Runny nose           Plan:   Strep negative  Will send for COVID testing. Advised to quarantine until we get results. Will start Medrol dose pack as directed  Encouraged to stay hydrated       Patient given educational materials -see patient instructions. Discussed use, benefit, and side effects of prescribed medications. All patient questions answered. Pt voiced understanding. Reviewed health maintenance. Instructed to continue currentmedications, diet and exercise. Patient agreed with treatment plan. Follow up as directed. MEDICATIONS:  Orders Placed This Encounter   Medications    methylPREDNISolone (MEDROL, EARL,) 4 MG tablet     Sig: Take by mouth. Dispense:  1 kit     Refill:  0         ORDERS:  Orders Placed This Encounter   Procedures    POCT rapid strep A       Follow-up:  No follow-ups on file. PATIENT INSTRUCTIONS:  There are no Patient Instructions on file for this visit.   Electronically signed by ALVERTO Busch on 9/29/2021 at 10:05 AM    EMR Dragon/transcription disclaimer:  Much of thisencounter note is electronic transcription/translation of spoken language to printed texts. The electronic translation of spoken language may be erroneous, or at times, nonsensical words or phrases may be inadvertentlytranscribed.   Although I have reviewed the note for such errors, some may still exist.

## 2021-10-13 ENCOUNTER — TELEPHONE (OUTPATIENT)
Dept: PRIMARY CARE CLINIC | Age: 41
End: 2021-10-13

## 2021-10-13 NOTE — TELEPHONE ENCOUNTER
Pt asking if you will fill out an Exemption form for Covid Vaccine due to her Blood clotting disorder and allergies.  Form must be turned in by 10/16/21

## 2021-10-24 DIAGNOSIS — F41.9 ANXIETY: ICD-10-CM

## 2021-10-25 RX ORDER — CLONAZEPAM 0.5 MG/1
0.5 TABLET ORAL 2 TIMES DAILY PRN
Qty: 45 TABLET | Refills: 0 | Status: SHIPPED | OUTPATIENT
Start: 2021-10-25 | End: 2021-11-22 | Stop reason: SDUPTHER

## 2021-10-26 DIAGNOSIS — F90.9 ATTENTION DEFICIT HYPERACTIVITY DISORDER (ADHD), UNSPECIFIED ADHD TYPE: ICD-10-CM

## 2021-10-26 RX ORDER — DEXTROAMPHETAMINE SACCHARATE, AMPHETAMINE ASPARTATE MONOHYDRATE, DEXTROAMPHETAMINE SULFATE AND AMPHETAMINE SULFATE 7.5; 7.5; 7.5; 7.5 MG/1; MG/1; MG/1; MG/1
30 CAPSULE, EXTENDED RELEASE ORAL DAILY
Qty: 30 CAPSULE | Refills: 0 | Status: SHIPPED | OUTPATIENT
Start: 2021-10-26 | End: 2021-11-24 | Stop reason: SDUPTHER

## 2021-11-23 DIAGNOSIS — F41.9 ANXIETY: ICD-10-CM

## 2021-11-23 DIAGNOSIS — R51.9 ACUTE NONINTRACTABLE HEADACHE, UNSPECIFIED HEADACHE TYPE: ICD-10-CM

## 2021-11-23 RX ORDER — NORETHINDRONE ACETATE AND ETHINYL ESTRADIOL, ETHINYL ESTRADIOL AND FERROUS FUMARATE 1MG-10(24)
1 KIT ORAL DAILY
Qty: 28 TABLET | Refills: 3 | Status: SHIPPED | OUTPATIENT
Start: 2021-11-23 | End: 2022-03-16 | Stop reason: SDUPTHER

## 2021-11-23 RX ORDER — CLONAZEPAM 0.5 MG/1
0.5 TABLET ORAL 2 TIMES DAILY PRN
Qty: 45 TABLET | Refills: 0 | Status: SHIPPED | OUTPATIENT
Start: 2021-11-23 | End: 2021-12-20 | Stop reason: SDUPTHER

## 2021-11-23 RX ORDER — BUTALBITAL, ACETAMINOPHEN AND CAFFEINE 50; 325; 40 MG/1; MG/1; MG/1
1 TABLET ORAL EVERY 6 HOURS PRN
Qty: 45 TABLET | Refills: 0 | Status: SHIPPED | OUTPATIENT
Start: 2021-11-23 | End: 2021-12-20 | Stop reason: SDUPTHER

## 2021-11-24 ENCOUNTER — TELEPHONE (OUTPATIENT)
Dept: PRIMARY CARE CLINIC | Age: 41
End: 2021-11-24

## 2021-11-24 DIAGNOSIS — F90.9 ATTENTION DEFICIT HYPERACTIVITY DISORDER (ADHD), UNSPECIFIED ADHD TYPE: ICD-10-CM

## 2021-11-24 RX ORDER — DEXTROAMPHETAMINE SACCHARATE, AMPHETAMINE ASPARTATE MONOHYDRATE, DEXTROAMPHETAMINE SULFATE AND AMPHETAMINE SULFATE 7.5; 7.5; 7.5; 7.5 MG/1; MG/1; MG/1; MG/1
30 CAPSULE, EXTENDED RELEASE ORAL DAILY
Qty: 30 CAPSULE | Refills: 0 | Status: SHIPPED | OUTPATIENT
Start: 2021-11-24 | End: 2021-12-20 | Stop reason: SDUPTHER

## 2021-11-24 RX ORDER — GALCANEZUMAB 120 MG/ML
120 INJECTION, SOLUTION SUBCUTANEOUS
Qty: 3 ML | Refills: 3 | Status: SHIPPED | OUTPATIENT
Start: 2021-11-24 | End: 2022-03-02 | Stop reason: SINTOL

## 2021-11-24 NOTE — TELEPHONE ENCOUNTER
----- Message from Bala Winslow sent at 11/24/2021  7:19 AM CST -----  Subject: Medication Problem    QUESTIONS  Name of Medication? Galcanezumab-gnlm (EMGALITY) 120 MG/ML SOAJ  Patient-reported dosage and instructions? Inject 120 mg into the skin   every 30 days  What question or problem do you have with the medication? Consuelo called   in and said that the instructions was sent for pt to do both, said to   inject the says syringe so they are wanting some clarification on which on   it is, it has not been filled yet   Preferred Pharmacy? [address], [address], [address] [facility]  Pharmacy phone number (if available)? [phone]  Additional Information for Provider? Option 0   ---------------------------------------------------------------------------  --------------  CALL BACK INFO  What is the best way for the office to contact you? OK to leave message on   voicemail  Preferred Call Back Phone Number? [phone]  ---------------------------------------------------------------------------  --------------  SCRIPT ANSWERS  Relationship to Patient? Third Party  Representative Name?  [employer]

## 2021-11-24 NOTE — TELEPHONE ENCOUNTER
Provider needs to review PDMP    PDMP Monitoring:    Last PDMP Sj Gabriel as Reviewed [facility]):  Review User Review Instant Review Result          Urine Drug Screenings (1 yr)    No resulted procedures found.        Medication Contract and Consent for Opioid Use Documents Filed      No documents found

## 2021-12-20 ENCOUNTER — OFFICE VISIT (OUTPATIENT)
Dept: PRIMARY CARE CLINIC | Age: 41
End: 2021-12-20
Payer: MEDICAID

## 2021-12-20 VITALS
TEMPERATURE: 97.4 F | SYSTOLIC BLOOD PRESSURE: 120 MMHG | HEART RATE: 79 BPM | HEIGHT: 67 IN | OXYGEN SATURATION: 98 % | WEIGHT: 142 LBS | DIASTOLIC BLOOD PRESSURE: 80 MMHG | BODY MASS INDEX: 22.29 KG/M2

## 2021-12-20 DIAGNOSIS — G44.52 NEW DAILY PERSISTENT HEADACHE: Primary | ICD-10-CM

## 2021-12-20 DIAGNOSIS — R51.9 ACUTE NONINTRACTABLE HEADACHE, UNSPECIFIED HEADACHE TYPE: ICD-10-CM

## 2021-12-20 DIAGNOSIS — F90.9 ATTENTION DEFICIT HYPERACTIVITY DISORDER (ADHD), UNSPECIFIED ADHD TYPE: ICD-10-CM

## 2021-12-20 DIAGNOSIS — F41.9 ANXIETY: ICD-10-CM

## 2021-12-20 PROCEDURE — 99214 OFFICE O/P EST MOD 30 MIN: CPT | Performed by: FAMILY MEDICINE

## 2021-12-20 RX ORDER — CLONAZEPAM 0.5 MG/1
0.5 TABLET ORAL 2 TIMES DAILY PRN
Qty: 45 TABLET | Refills: 0 | Status: SHIPPED | OUTPATIENT
Start: 2021-12-20 | End: 2022-01-17 | Stop reason: SDUPTHER

## 2021-12-20 RX ORDER — DEXTROAMPHETAMINE SACCHARATE, AMPHETAMINE ASPARTATE MONOHYDRATE, DEXTROAMPHETAMINE SULFATE AND AMPHETAMINE SULFATE 7.5; 7.5; 7.5; 7.5 MG/1; MG/1; MG/1; MG/1
30 CAPSULE, EXTENDED RELEASE ORAL DAILY
Qty: 30 CAPSULE | Refills: 0 | Status: SHIPPED | OUTPATIENT
Start: 2021-12-20 | End: 2022-01-17 | Stop reason: SDUPTHER

## 2021-12-20 RX ORDER — BUTALBITAL, ACETAMINOPHEN AND CAFFEINE 50; 325; 40 MG/1; MG/1; MG/1
1 TABLET ORAL EVERY 6 HOURS PRN
Qty: 45 TABLET | Refills: 0 | Status: SHIPPED | OUTPATIENT
Start: 2021-12-20 | End: 2022-06-07

## 2021-12-20 ASSESSMENT — ENCOUNTER SYMPTOMS
DIARRHEA: 0
COUGH: 0
ABDOMINAL PAIN: 0
NAUSEA: 0
WHEEZING: 0
EYE DISCHARGE: 0
BACK PAIN: 0
VOMITING: 0
COLOR CHANGE: 0

## 2021-12-20 NOTE — PROGRESS NOTES
SUBJECTIVE:    Patient ID: Millie Cooper is a 46 y.o. female. HPI:   Patient is seen today for a follow-up. She is having headaches almost every day. She states that she has had to quit working because of the headaches. She is currently doing Emgality for prevention as well as Topamax. She is also still taking Nurtec when needed but she states that it really is not helping with the headaches. She states that she would like to go see neuro at this point because the headaches just or not getting better. She is having worsening headache on the right side and she states that she is having a floater in the right eye as well. She has not had an eye exam done recently. She states that she does have a history of an astigmatism. She states that she has not had an MRI done of her head recently. She does have a history of ADHD and is on Adderall XR 30 mg. She does need a refill on this today. She states that she is tolerating it well denies any side effects to it. She also has a history of anxiety. She is currently on Klonopin and takes this when needed for her anxiety. She states that it is working well for her. She does need a refill on this today.     Past Medical History:   Diagnosis Date    ADHD (attention deficit hyperactivity disorder)     Clotting disorder (HCC)     factor 14, 11    GERD (gastroesophageal reflux disease)     Headache       Current Outpatient Medications on File Prior to Visit   Medication Sig Dispense Refill    Galcanezumab-gnlm (EMGALITY) 120 MG/ML SOAJ Inject 120 mg into the skin every 30 days 3 mL 3    topiramate (TOPAMAX) 200 MG tablet Take 1 tablet by mouth 2 times daily 180 tablet 3    norethindrone-ethinyl estradiol-Fe (LO LOESTRIN FE) 1 MG-10 MCG / 10 MCG tablet Take 1 tablet by mouth daily 28 tablet 3    Rimegepant Sulfate (NURTEC) 75 MG TBDP Take 75 mg by mouth daily as needed (migraine) 30 tablet 2    PARoxetine (PAXIL) 10 MG tablet Take 1 tablet by mouth daily (Patient not taking: Reported on 12/20/2021) 30 tablet 3     No current facility-administered medications on file prior to visit. Allergies   Allergen Reactions    Iv Dye [Iodides] Itching     Itchy throat, redness    Red Dye Swelling     Throat swells    Shellfish-Derived Products Swelling     tongue and throat swells    Oxaprozin     Shrimp Flavor     Tyloxapol     Norco [Hydrocodone-Acetaminophen] Rash    Percocet [Oxycodone-Acetaminophen] Rash       Review of Systems   Constitutional: Negative for activity change, appetite change and fever. HENT: Negative for congestion and nosebleeds. Eyes: Negative for discharge. Respiratory: Negative for cough and wheezing. Cardiovascular: Negative for chest pain and leg swelling. Gastrointestinal: Negative for abdominal pain, diarrhea, nausea and vomiting. Genitourinary: Negative for difficulty urinating, frequency and urgency. Musculoskeletal: Negative for back pain and gait problem. Skin: Negative for color change and rash. Neurological: Negative for dizziness and headaches. Hematological: Does not bruise/bleed easily. Psychiatric/Behavioral: Negative for sleep disturbance and suicidal ideas. OBJECTIVE:    Physical Exam  Vitals reviewed. Constitutional:       General: She is not in acute distress. Appearance: Normal appearance. She is well-developed. She is not diaphoretic. HENT:      Head: Normocephalic and atraumatic. Right Ear: External ear normal.      Left Ear: External ear normal.   Cardiovascular:      Rate and Rhythm: Normal rate and regular rhythm. Pulses: Normal pulses. Heart sounds: Normal heart sounds. No murmur heard. Pulmonary:      Effort: Pulmonary effort is normal. No respiratory distress. Breath sounds: Normal breath sounds. Musculoskeletal:      Cervical back: Normal range of motion and neck supple. Skin:     General: Skin is warm and dry.    Neurological:      General: No documents found                 EMR Dragon/transcription disclaimer:  Much of this encounter note is electronic transcription/translation of spoken language toprinted texts. The electronic translation of spoken language may be erroneous, or at times, nonsensical words or phrases may be inadvertently transcribed.   Although I have reviewed the note for such errors, some may stillexist.

## 2021-12-28 ENCOUNTER — HOSPITAL ENCOUNTER (OUTPATIENT)
Dept: MRI IMAGING | Age: 41
Discharge: HOME | End: 2021-12-28
Payer: MEDICAID

## 2021-12-28 DIAGNOSIS — G44.52 NEW DAILY PERSISTENT HEADACHE: ICD-10-CM

## 2021-12-28 PROCEDURE — 70551 MRI BRAIN STEM W/O DYE: CPT

## 2022-01-18 DIAGNOSIS — F41.9 ANXIETY: ICD-10-CM

## 2022-01-18 DIAGNOSIS — F90.9 ATTENTION DEFICIT HYPERACTIVITY DISORDER (ADHD), UNSPECIFIED ADHD TYPE: ICD-10-CM

## 2022-01-18 RX ORDER — DEXTROAMPHETAMINE SACCHARATE, AMPHETAMINE ASPARTATE MONOHYDRATE, DEXTROAMPHETAMINE SULFATE AND AMPHETAMINE SULFATE 7.5; 7.5; 7.5; 7.5 MG/1; MG/1; MG/1; MG/1
30 CAPSULE, EXTENDED RELEASE ORAL DAILY
Qty: 30 CAPSULE | Refills: 0 | Status: SHIPPED | OUTPATIENT
Start: 2022-01-18 | End: 2022-02-17 | Stop reason: SDUPTHER

## 2022-01-18 RX ORDER — CLONAZEPAM 0.5 MG/1
0.5 TABLET ORAL 2 TIMES DAILY PRN
Qty: 45 TABLET | Refills: 0 | Status: SHIPPED | OUTPATIENT
Start: 2022-01-18 | End: 2022-02-17 | Stop reason: SDUPTHER

## 2022-01-18 NOTE — TELEPHONE ENCOUNTER
PDMP Monitoring:    Last PDMP Karen Guillermosom as Reviewed [facility]):  Review User Review Instant Review Result   SUKHWINDER SLADE 11/24/2021 10:24 AM Reviewed PDMP [1]     Urine Drug Screenings (1 yr)    No resulted procedures found.        Medication Contract and Consent for Opioid Use Documents Filed      No documents found

## 2022-02-17 DIAGNOSIS — F90.9 ATTENTION DEFICIT HYPERACTIVITY DISORDER (ADHD), UNSPECIFIED ADHD TYPE: ICD-10-CM

## 2022-02-17 DIAGNOSIS — F41.9 ANXIETY: ICD-10-CM

## 2022-02-18 RX ORDER — CLONAZEPAM 0.5 MG/1
0.5 TABLET ORAL 2 TIMES DAILY PRN
Qty: 45 TABLET | Refills: 0 | Status: SHIPPED | OUTPATIENT
Start: 2022-02-18 | End: 2022-03-16 | Stop reason: SDUPTHER

## 2022-02-18 RX ORDER — DEXTROAMPHETAMINE SACCHARATE, AMPHETAMINE ASPARTATE MONOHYDRATE, DEXTROAMPHETAMINE SULFATE AND AMPHETAMINE SULFATE 7.5; 7.5; 7.5; 7.5 MG/1; MG/1; MG/1; MG/1
30 CAPSULE, EXTENDED RELEASE ORAL DAILY
Qty: 30 CAPSULE | Refills: 0 | Status: SHIPPED | OUTPATIENT
Start: 2022-02-18 | End: 2022-03-16 | Stop reason: SDUPTHER

## 2022-02-18 NOTE — TELEPHONE ENCOUNTER
PDMP Monitoring:    Last PDMP Vitaliy Rincon as Reviewed [facility]):  Review User Review Instant Review Result   SUKHWINDER SLADE 11/24/2021 10:24 AM Reviewed PDMP [1]     Urine Drug Screenings (1 yr)    No resulted procedures found.        Medication Contract and Consent for Opioid Use Documents Filed      No documents found

## 2022-03-02 ENCOUNTER — OFFICE VISIT (OUTPATIENT)
Dept: NEUROSURGERY | Age: 42
End: 2022-03-02
Payer: COMMERCIAL

## 2022-03-02 VITALS
OXYGEN SATURATION: 99 % | WEIGHT: 142 LBS | SYSTOLIC BLOOD PRESSURE: 112 MMHG | HEIGHT: 67 IN | DIASTOLIC BLOOD PRESSURE: 64 MMHG | BODY MASS INDEX: 22.29 KG/M2 | HEART RATE: 85 BPM

## 2022-03-02 DIAGNOSIS — G43.719 CHRONIC MIGRAINE WITHOUT AURA, INTRACTABLE, WITHOUT STATUS MIGRAINOSUS: Primary | ICD-10-CM

## 2022-03-02 PROCEDURE — 99204 OFFICE O/P NEW MOD 45 MIN: CPT | Performed by: PSYCHIATRY & NEUROLOGY

## 2022-03-02 RX ORDER — PROMETHAZINE HYDROCHLORIDE 25 MG/1
25 TABLET ORAL EVERY 6 HOURS PRN
COMMUNITY
End: 2022-09-22

## 2022-03-02 RX ORDER — ONDANSETRON HYDROCHLORIDE 8 MG/1
8 TABLET, FILM COATED ORAL EVERY 8 HOURS PRN
COMMUNITY
End: 2022-07-22 | Stop reason: SDUPTHER

## 2022-03-02 NOTE — PROGRESS NOTES
[id number] [facility] Neurology Office Note      Patient:   Ned Caldwell  MR#:    [id number]  Account Number:                         YOB: 1980  Date of Evaluation:  3/2/2022  Time of Note:                          10:57 AM  Primary/Referring Physician:  Alicia Bojorquez MD   Consulting Physician:  Jeanie Schmid DO    NEW PATIENT CONSULTATION    Chief Complaint   Patient presents with    New Patient     Referral for daily persistant headaches        HISTORY OF PRESENT ILLNESS    Ned Caldwell is a 46y.o. year old female here for headaches. Headaches have been present for quite sometime. Pain is typically frontal, will last days to hours, frequency is almost daily, some photophobia, nausea, some visual changes noted, scotoma-like. She is on Topmax 200 mg bid, no side effects, has been on this for quite sometime. She tried [facility] but was unable to tolerate. Tried Nurtec as well. Failed ubrelvy as well. MRI brain recently preformed, negative.        Past Medical History:   Diagnosis Date    ADHD (attention deficit hyperactivity disorder)     Clotting disorder (HCC)     factor 14, 11    GERD (gastroesophageal reflux disease)     Headache        Past Surgical History:   Procedure Laterality Date    CHOLECYSTECTOMY      COLONOSCOPY  2005    \"NORMAL\" PER PT RECALL    COLONOSCOPY  2019 [address] [place], [place]- \" normal\" per pt recall    [place] COLONOSCOPY FLX DX W/COLLJ SPEC WHEN PFRMD N/A 7/23/2018    Dr Aleta Dominguez, 15 yr (age 50) recall    [place] EGD TRANSORAL BIOPSY SINGLE/MULTIPLE N/A 7/23/2018    Dr Sarah Garcia (-) Cathy, (+) Barrettt's, (-) dysplasia--1st dx--1 yr recall   [facility] ENDOSCOPY  2005    \"NORMAL\" PER PT RECALL    UPPER GASTROINTESTINAL ENDOSCOPY  2019 [address] [place], [place]- \"normal\" per pt recall    UPPER GASTROINTESTINAL ENDOSCOPY N/A 9/30/2020    Dr Karina Woodard exam,  NEG EoE, Neg Barretts, no recall       Family History   Problem Relation Age of Onset    Esophageal Cancer Paternal Uncle     Colon Cancer Neg Hx     Colon Polyps Neg Hx     Liver Cancer Neg Hx     Liver Disease Neg Hx     Stomach Cancer Neg Hx     Rectal Cancer Neg Hx        Social History     Socioeconomic History    Marital status: Single     Spouse name: Not on file    Number of children: Not on file    Years of education: Not on file    Highest education level: Not on file   Occupational History    Not on file   Tobacco Use    Smoking status: Former Smoker     Types: Cigarettes    Smokeless tobacco: Never Used   Vaping Use    Vaping Use: Never used   Substance and Sexual Activity    Alcohol use: Yes     Comment: every week-end    Drug use: No    Sexual activity: Yes   Other Topics Concern    Not on file   Social History Narrative    Not on file     Social Determinants of Health     Financial Resource Strain:     Difficulty of Paying Living Expenses: Not on file   Food Insecurity:     Worried About Running Out of Food in the Last Year: Not on file    Tian[place] of Food in the Last Year: Not on file   Transportation Needs:     Lack of Transportation (Medical): Not on file    Lack of Transportation (Non-Medical):  Not on file   Physical Activity:     Days of Exercise per Week: Not on file    Minutes of Exercise per Session: Not on file   Stress:     Feeling of Stress : Not on file   Social Connections:     Frequency of Communication with Friends and Family: Not on file    Frequency of Social Gatherings with Friends and Family: Not on file    Attends [employer] Services: Not on file    Active Member of Clubs or Organizations: Not on file    Attends Club or Organization Meetings: Not on file    Marital Status: Not on file   Intimate Partner Violence:     Fear of Current or Ex-Partner: Not on file    Emotionally Abused: Not on file    Physically Abused: Not on file    Sexually Abused: Not on file   Housing Stability:     Unable to Pay for Housing in the Last Year: Not on file    Number of Places Lived in the Last Year: Not on file    Unstable Housing in the Last Year: Not on file       Current Outpatient Medications   Medication Sig Dispense Refill    ondansetron (ZOFRAN) 8 MG tablet Take 8 mg by mouth every 8 hours as needed for Nausea or Vomiting      promethazine (PHENERGAN) 25 MG tablet Take 25 mg by mouth every 6 hours as needed for Nausea      topiramate (TOPAMAX) 200 MG tablet Take 1 tablet by mouth 2 times daily 180 tablet 3    amphetamine-dextroamphetamine (ADDERALL XR) 30 MG extended release capsule Take 1 capsule by mouth daily for 30 days. 30 capsule 0    clonazePAM (KLONOPIN) 0.5 MG tablet Take 1 tablet by mouth 2 times daily as needed for Anxiety for up to 30 days. 45 tablet 0    butalbital-acetaminophen-caffeine (FIORICET, ESGIC) -40 MG per tablet Take 1 tablet by mouth every 6 hours as needed for Headaches 45 tablet 0    norethindrone-ethinyl estradiol-Fe (LO LOESTRIN FE) 1 MG-10 MCG / 10 MCG tablet Take 1 tablet by mouth daily 28 tablet 3     No current facility-administered medications for this visit.        Allergies   Allergen Reactions    Iv Dye [Iodides] Itching     Itchy throat, redness    Red Dye Swelling     Throat swells    Shellfish-Derived Products Swelling     tongue and throat swells    Oxaprozin     Shrimp Flavor     Tyloxapol     Norco [Hydrocodone-Acetaminophen] Rash    Percocet [Oxycodone-Acetaminophen] Rash         REVIEW OF SYSTEMS    Constitutional: []Fever []Sweat []Chills [] Recent Injury [x] Denies all unless marked  HEENT:[x]Headache  [] Head Injury/Hearing Loss  [] Sore Throat  [] Ear Ache/Dizziness  [x] Denies all unless marked  Spine:  [] Neck pain  [] Back pain  [] Sciaticia  [x] Denies all unless marked  Cardiovascular:[]Heart Disease []Chest Pain [] Palpitations  [x] Denies all unless marked  Pulmonary: []Shortness of Breath []Cough   [x] Denies all unless marke  Gastrointestinal: [x]Nausea appear appropriate  [x]Recent and remote memory appears unremarkable  [x]Speech normal without dysarthria or aphasia, comprehension and repetition intact. COMMENTS:    Cranial Nerves [x]No VF deficit to confrontation,  no papilledema on fundoscopic exam.  [x]PERRLA, EOMI, no nystagmus, conjugate eye movements, no ptosis  [x]Face symmetric  [x]Facial sensation intact  [x]Tongue midline no atrophy or fasciculations present  [x]Palate midline, hearing to finger rub normal bilaterally  [x]Shoulder shrug and SCM testing normal bilaterally  COMMENTS:   Motor   [x]5/5 strength x 4 extremities  [x]Normal bulk and tone  [x]No tremor present  [x]No rigidity or bradykinesia noted  COMMENTS:   Sensory  [x]Sensation intact to light touch, pin prick, vibration, and proprioception BLE  []Sensation intact to light touch, pin prick, vibration, and proprioception BUE  COMMENTS:   Coordination [x]FTN normal bilaterally   []HTS normal bilaterally  []ELMER normal bilaterally.    COMMENTS:   Reflexes  [x]Symmetric and non-pathological  [x]Toes down going bilaterally  [x]No clonus present  COMMENTS:   Gait                  [x]Normal steady gait    []Ataxic    []Spastic     []Magnetic     []Shuffling  COMMENTS:       LABS RECORD AND IMAGING REVIEW (As below and per HPI)    Lab Results   Component Value Date    ADRWUURC08 1116 (H) 09/10/2020     Lab Results   Component Value Date    WBC 6.4 09/10/2020    HGB 14.3 09/10/2020    HCT 43.2 09/10/2020    MCV 94.1 09/10/2020     09/10/2020     Lab Results   Component Value Date     09/10/2020    K 3.6 09/10/2020     09/10/2020    CO2 19 (L) 09/10/2020    BUN 11 09/10/2020    CREATININE 0.6 09/10/2020    GLUCOSE 91 09/10/2020    CALCIUM 8.9 09/10/2020    PROT 7.0 09/10/2020    LABALBU 4.6 09/10/2020    BILITOT 0.8 09/10/2020    ALKPHOS 39 09/10/2020    AST 18 09/10/2020    ALT 11 09/10/2020    LABGLOM >60 09/10/2020    GFRAA >59 09/10/2020       MRI BRAIN WO CONTRAST    Result Date: 12/28/2021  EXAM: MR BRAIN WITHOUT IV CONTRAST 12/28/2021. COMPARISON: None HISTORY: 46years-old Female. Chronic headaches. TECHNIQUE: Routine pulse sequences of the brain were obtained without IV contrast. REPORT: The ventricles and cerebrospinal fluid spaces are normal in size and configuration for the patient's age. There is no evidence of mass-effect or midline shift. No reduced diffusivity is demonstrated. The brainstem, sella, pituitary, cerebellum are unremarkable. The basal cisterns are preserved. No acute osseous lesion. The intraorbital structures are unremarkable. The flow voids are preserved. The mastoid air cells are clear. The paranasal sinuses are free of obstructive mucosal disease. 1.  Unremarkable MRI of the brain for age. Signed by Dr Kavya Herron      ASSESSMENT:    Eva Patel is a 46y.o. year old female here for headaches, suspect chronic migraine given history, exam non-focal, recent MRI negative. Multiple treatment failures. On Topamax, failed emgality, nurtec, ubrelvy, multiple triptan failures as well. PLAN:  1. Plan a trial of botox, may consider Vonzell La if that is not effective. 2.  Continue topamax for now, will likely wean if botox is effective. Side effects discussed. 3.  Fioricet prn sparingly   4. Limit OTC pain meds to avoid rebound.      Amparo Raymundo, DO  Board Certified Neurology

## 2022-03-05 ENCOUNTER — OFFICE VISIT (OUTPATIENT)
Age: 42
End: 2022-03-05
Payer: COMMERCIAL

## 2022-03-05 VITALS
DIASTOLIC BLOOD PRESSURE: 72 MMHG | TEMPERATURE: 97.8 F | OXYGEN SATURATION: 100 % | BODY MASS INDEX: 22.13 KG/M2 | RESPIRATION RATE: 12 BRPM | HEART RATE: 99 BPM | WEIGHT: 141 LBS | HEIGHT: 67 IN | SYSTOLIC BLOOD PRESSURE: 112 MMHG

## 2022-03-05 DIAGNOSIS — R19.8 UMBILICAL DISCHARGE: Primary | ICD-10-CM

## 2022-03-05 PROCEDURE — 99213 OFFICE O/P EST LOW 20 MIN: CPT | Performed by: NURSE PRACTITIONER

## 2022-03-05 RX ORDER — SULFAMETHOXAZOLE AND TRIMETHOPRIM 800; 160 MG/1; MG/1
1 TABLET ORAL 2 TIMES DAILY
Qty: 20 TABLET | Refills: 0 | Status: SHIPPED | OUTPATIENT
Start: 2022-03-05 | End: 2022-04-05 | Stop reason: SDUPTHER

## 2022-03-05 ASSESSMENT — ENCOUNTER SYMPTOMS
GASTROINTESTINAL NEGATIVE: 1
ALLERGIC/IMMUNOLOGIC NEGATIVE: 1
RESPIRATORY NEGATIVE: 1
EYES NEGATIVE: 1

## 2022-03-05 NOTE — PROGRESS NOTES
[address] [facility]  [address] [address] [place] [address]  Dept: [phone]  Dept Fax: [phone]  Loc: [phone]    Nicolasa Muller is a 46 y.o. female who presents today for her medical conditions/complaints as noted below. Nicolasa Muller is c/o of Wound Infection (belly button bleeding and possible infection,  took navel piercing out 6 months ago)        HPI:   She presents with umbilical drainage for the past several days. States she has been cleansing with Peroxide and putting peroxide on cotton ball and leaving it in there. She reports having an umbilical piercing but took it out about six months.      HPI   Chief Complaint   Patient presents with    Wound Infection     belly button bleeding and possible infection,  took navel piercing out 6 months ago     Past Medical History:   Diagnosis Date    ADHD (attention deficit hyperactivity disorder)     Clotting disorder (HCC)     factor 14, 11    GERD (gastroesophageal reflux disease)     Headache       Past Surgical History:   Procedure Laterality Date    CHOLECYSTECTOMY      COLONOSCOPY  2005    \"NORMAL\" PER PT RECALL    [place]ONOSCOPY  2019    [place]- \" normal\" per pt recall    [place] COLONOSCOPY FLX DX W/COLLJ SPEC WHEN PFRMD N/A 7/23/2018    Dr Lux Carlson, 15 yr (age 50) recall    [place] EGD TRANSORAL BIOPSY SINGLE/MULTIPLE N/A 7/23/2018    Dr Elliott Roberson (-) Cathy, (+) Barrettt's, (-) dysplasia--1st dx--1 yr recall   [facility]  2005    \"NORMAL\" PER PT RECALL    UPPER GASTROINTESTINAL ENDOSCOPY  2019    [place], [place]- \"normal\" per pt recall    UPPER GASTROINTESTINAL ENDOSCOPY N/A 9/30/2020    Dr Ga Leavitt exam,  NEG EoE, Neg Barretts, no recall       Vitals 3/5/2022 3/2/2022 12/20/2021 9/29/2021 9/27/2021 4/57/3574   SYSTOLIC 451 608 092 249 998 190   DIASTOLIC 72 64 80 72 80 76   Site - - - - - Right Upper Arm   Position - - - - - Sitting   Cuff Size - - - - - Medium Adult   Pulse 99 85 79 91 82 89   Temp 97.8 - 97.4 97.7 97.4 96.6   Resp 12 - - - - 16   SpO2 100 99 98 97 99 99   Weight 141 lb 142 lb 142 lb 137 lb 12.8 oz 137 lb 133 lb   Height 5' 7\" 5' 7\" 5' 7\" 5' 7\" 5' 6\" 5' 6\"   Body mass index 22.08 kg/m2 22.24 kg/m2 22.24 kg/m2 21.58 kg/m2 22.11 kg/m2 21.46 kg/m2   Some recent data might be hidden       Family History   Problem Relation Age of Onset    Esophageal Cancer Paternal Uncle     Colon Cancer Neg Hx     Colon Polyps Neg Hx     Liver Cancer Neg Hx     Liver Disease Neg Hx     Stomach Cancer Neg Hx     Rectal Cancer Neg Hx        Social History     Tobacco Use    Smoking status: Former Smoker     Types: Cigarettes    Smokeless tobacco: Never Used   Substance Use Topics    Alcohol use: Yes     Comment: every week-end      Current Outpatient Medications on File Prior to Visit   Medication Sig Dispense Refill    ondansetron (ZOFRAN) 8 MG tablet Take 8 mg by mouth every 8 hours as needed for Nausea or Vomiting      promethazine (PHENERGAN) 25 MG tablet Take 25 mg by mouth every 6 hours as needed for Nausea      topiramate (TOPAMAX) 200 MG tablet Take 1 tablet by mouth 2 times daily 180 tablet 3    amphetamine-dextroamphetamine (ADDERALL XR) 30 MG extended release capsule Take 1 capsule by mouth daily for 30 days. 30 capsule 0    clonazePAM (KLONOPIN) 0.5 MG tablet Take 1 tablet by mouth 2 times daily as needed for Anxiety for up to 30 days. 45 tablet 0    butalbital-acetaminophen-caffeine (FIORICET, ESGIC) -40 MG per tablet Take 1 tablet by mouth every 6 hours as needed for Headaches 45 tablet 0    norethindrone-ethinyl estradiol-Fe (LO LOESTRIN FE) 1 MG-10 MCG / 10 MCG tablet Take 1 tablet by mouth daily 28 tablet 3     No current facility-administered medications on file prior to visit.      Allergies   Allergen Reactions    Iv Dye [Iodides] Itching     Itchy throat, redness    Red Dye Swelling Throat swells    Shellfish-Derived Products Swelling     tongue and throat swells    Oxaprozin     Shrimp Flavor     Tyloxapol     Norco [Hydrocodone-Acetaminophen] Rash    Percocet [Oxycodone-Acetaminophen] Rash       Health Maintenance   Topic Date Due    Hepatitis C screen  Never done    Varicella vaccine (1 of 2 - 2-dose childhood series) Never done    COVID-19 Vaccine (1) Never done    Flu vaccine (1) 09/01/2021    Depression Screen  05/17/2022    Cervical cancer screen  11/20/2022    DTaP/Tdap/Td vaccine (2 - Td or Tdap) 07/11/2024    Colorectal Cancer Screen  07/23/2032    HIV screen  Completed    Hepatitis A vaccine  Aged Out    Hepatitis B vaccine  Aged Out    Hib vaccine  Aged Out    Meningococcal (ACWY) vaccine  Aged Out    Pneumococcal 0-64 years Vaccine  Aged Out       Subjective:      Review of Systems   Constitutional: Negative. HENT: Negative. Eyes: Negative. Respiratory: Negative. Cardiovascular: Negative. Gastrointestinal: Negative. Endocrine: Negative. Genitourinary: Negative. Musculoskeletal: Negative. Skin: Positive for wound (umbilical ). Allergic/Immunologic: Negative. Neurological: Negative. Hematological: Negative. Psychiatric/Behavioral: Negative. Objective:     Physical Exam  Vitals and nursing note reviewed. Constitutional:       Appearance: Normal appearance. She is not ill-appearing or diaphoretic. HENT:      Head: Normocephalic. Nose: Nose normal.   Eyes:      General:         Right eye: No discharge. Left eye: No discharge. Cardiovascular:      Rate and Rhythm: Normal rate and regular rhythm. Pulses: Normal pulses. Heart sounds: Normal heart sounds. Pulmonary:      Effort: Pulmonary effort is normal.      Breath sounds: Normal breath sounds. Musculoskeletal:         General: Normal range of motion. Cervical back: Normal range of motion. Skin:     General: Skin is warm and dry. Comments: Umbilical crusting with minimal drainage. No warmth. Tenderness to palpation   Neurological:      Mental Status: She is alert and oriented to person, place, and time. Psychiatric:         Mood and Affect: Mood normal.         Behavior: Behavior normal.       /72   Pulse 99   Temp 97.8 °F (36.6 °C)   Resp 12   Ht 5' 7\" (1.702 m)   Wt 141 lb (64 kg)   SpO2 100%   BMI 22.08 kg/m²     Assessment:       Diagnosis Orders   1. Umbilical discharge           Plan:   Advised to wash the area with antibacterial soap and water. Use cotton ball to absorb additional drainage. Start Bactrim DS as prescribed. Follow up with PCP if drainage persists worsens. PDMP Monitoring:    Last PDMP [facility] SYSTEM as Reviewed [facility]):  Review User Review Instant Review Result   RACHANAMARCINSUKHWINDER MITCHELL 11/24/2021 10:24 AM Reviewed PDMP [1]       Urine Drug Screenings (1 yr)    No resulted procedures found. Medication Contract and Consent for Opioid Use Documents Filed      No documents found                 Patient given educational materials -see patient instructions. Discussed use, benefit, and side effects of prescribed medications. All patient questions answered. Pt voiced understanding. Reviewed health maintenance. Instructed to continue currentmedications, diet and exercise. Patient agreed with treatment plan. Follow up as directed. MEDICATIONS:  Orders Placed This Encounter   Medications    sulfamethoxazole-trimethoprim (BACTRIM DS) 800-160 MG per tablet     Sig: Take 1 tablet by mouth 2 times daily for 10 days     Dispense:  20 tablet     Refill:  0         ORDERS:  No orders of the defined types were placed in this encounter. Follow-up:  No follow-ups on file. PATIENT INSTRUCTIONS:  There are no Patient Instructions on file for this visit.   Electronically signed by ALVERTO Scherer on 3/5/2022 at 4:52 PM    EMR Dragon/transcription disclaimer:  Much of thisencounter note is electronic transcription/translation of spoken language to printed texts. The electronic translation of spoken language may be erroneous, or at times, nonsensical words or phrases may be inadvertentlytranscribed.   Although I have reviewed the note for such errors, some may still exist.

## 2022-03-08 ENCOUNTER — OFFICE VISIT (OUTPATIENT)
Dept: PRIMARY CARE CLINIC | Age: 42
End: 2022-03-08
Payer: COMMERCIAL

## 2022-03-08 VITALS
TEMPERATURE: 98.1 F | DIASTOLIC BLOOD PRESSURE: 70 MMHG | BODY MASS INDEX: 22.1 KG/M2 | HEIGHT: 67 IN | RESPIRATION RATE: 18 BRPM | SYSTOLIC BLOOD PRESSURE: 110 MMHG | HEART RATE: 81 BPM | OXYGEN SATURATION: 99 % | WEIGHT: 140.8 LBS

## 2022-03-08 DIAGNOSIS — Q89.9 UMBILICAL ABNORMALITY: Primary | ICD-10-CM

## 2022-03-08 DIAGNOSIS — R19.8 UMBILICAL DISCHARGE: ICD-10-CM

## 2022-03-08 PROCEDURE — 99213 OFFICE O/P EST LOW 20 MIN: CPT | Performed by: FAMILY MEDICINE

## 2022-03-08 RX ORDER — FLUCONAZOLE 100 MG/1
100 TABLET ORAL DAILY
Qty: 7 TABLET | Refills: 0 | Status: SHIPPED | OUTPATIENT
Start: 2022-03-08 | End: 2022-04-05 | Stop reason: SDUPTHER

## 2022-03-08 ASSESSMENT — ENCOUNTER SYMPTOMS
DIARRHEA: 0
BACK PAIN: 0
ABDOMINAL PAIN: 0
WHEEZING: 0
EYE DISCHARGE: 0
VOMITING: 0
NAUSEA: 0
COUGH: 0
COLOR CHANGE: 0

## 2022-03-08 NOTE — PROGRESS NOTES
SUBJECTIVE:    Patient ID: Tivis Prader is a 46 y.o. female. HPI:   Patient is seen today for problems with umbilical drainage and irritation. She was seen in urgent care on Saturday and they put her on Bactrim. She states that she does like it is gotten better. She was using peroxide to clean it out and she states that it has gotten better but she states that it is bleeding some. She states that it is still irritated. She states that it is hurting slightly. It was red around it but that has gotten better as well. She has not had any injury to this area. She does have a history of having an umbilical piercing. Past Medical History:   Diagnosis Date    ADHD (attention deficit hyperactivity disorder)     Clotting disorder (HCC)     factor 14, 11    GERD (gastroesophageal reflux disease)     Headache       Current Outpatient Medications on File Prior to Visit   Medication Sig Dispense Refill    sulfamethoxazole-trimethoprim (BACTRIM DS) 800-160 MG per tablet Take 1 tablet by mouth 2 times daily for 10 days 20 tablet 0    ondansetron (ZOFRAN) 8 MG tablet Take 8 mg by mouth every 8 hours as needed for Nausea or Vomiting      promethazine (PHENERGAN) 25 MG tablet Take 25 mg by mouth every 6 hours as needed for Nausea      topiramate (TOPAMAX) 200 MG tablet Take 1 tablet by mouth 2 times daily 180 tablet 3    amphetamine-dextroamphetamine (ADDERALL XR) 30 MG extended release capsule Take 1 capsule by mouth daily for 30 days. 30 capsule 0    clonazePAM (KLONOPIN) 0.5 MG tablet Take 1 tablet by mouth 2 times daily as needed for Anxiety for up to 30 days.  45 tablet 0    butalbital-acetaminophen-caffeine (FIORICET, ESGIC) -40 MG per tablet Take 1 tablet by mouth every 6 hours as needed for Headaches 45 tablet 0    norethindrone-ethinyl estradiol-Fe (LO LOESTRIN FE) 1 MG-10 MCG / 10 MCG tablet Take 1 tablet by mouth daily 28 tablet 3     No current facility-administered medications on file prior to visit. Allergies   Allergen Reactions    Iv Dye [Iodides] Itching     Itchy throat, redness    Red Dye Swelling     Throat swells    Shellfish-Derived Products Swelling     tongue and throat swells    Oxaprozin     Shrimp Flavor     Tyloxapol     Norco [Hydrocodone-Acetaminophen] Rash    Percocet [Oxycodone-Acetaminophen] Rash       Review of Systems   Constitutional: Negative for activity change, appetite change and fever. HENT: Negative for congestion and nosebleeds. Eyes: Negative for discharge. Respiratory: Negative for cough and wheezing. Cardiovascular: Negative for chest pain and leg swelling. Gastrointestinal: Negative for abdominal pain, diarrhea, nausea and vomiting. Genitourinary: Negative for difficulty urinating, frequency and urgency. Musculoskeletal: Negative for back pain and gait problem. Skin: Negative for color change and rash. Umbilical drainage and irritation   Neurological: Negative for dizziness and headaches. Hematological: Does not bruise/bleed easily. Psychiatric/Behavioral: Negative for sleep disturbance and suicidal ideas. OBJECTIVE:    Physical Exam  Vitals reviewed. Constitutional:       General: She is not in acute distress. Appearance: Normal appearance. She is well-developed. She is not diaphoretic. HENT:      Head: Normocephalic and atraumatic. Right Ear: External ear normal.      Left Ear: External ear normal.   Cardiovascular:      Rate and Rhythm: Normal rate and regular rhythm. Pulses: Normal pulses. Heart sounds: Normal heart sounds. No murmur heard. Pulmonary:      Effort: Pulmonary effort is normal. No respiratory distress. Breath sounds: Normal breath sounds. Abdominal:      Comments: Umbilicus irritated and abrasions with scaling and peeling skin   Musculoskeletal:      Cervical back: Normal range of motion and neck supple. Skin:     General: Skin is warm and dry.    Neurological: General: No focal deficit present. Mental Status: She is alert and oriented to person, place, and time. Mental status is at baseline. Psychiatric:         Mood and Affect: Mood normal.         Behavior: Behavior normal.         Thought Content: Thought content normal.         Judgment: Judgment normal.        /70   Pulse 81   Temp 98.1 °F (36.7 °C)   Resp 18   Ht 5' 7\" (1.702 m)   Wt 140 lb 12.8 oz (63.9 kg)   SpO2 99%   BMI 22.05 kg/m²      ASSESSMENT/PLAN:    1. Umbilical abnormality  -     Culture, Wound  2. Umbilical discharge       Diflucan was sent to the pharmacy. Culture was obtained. Continue bactrim. PDMP Monitoring:    Last PDMP [place] as Reviewed [facility]):  Review User Review Instant Review Result   SUKHWINDER SLADE 11/24/2021 10:24 AM Reviewed PDMP [1]       Urine Drug Screenings (1 yr)    No resulted procedures found. Medication Contract and Consent for Opioid Use Documents Filed      No documents found                 EMR Dragon/transcription disclaimer:  Much of this encounter note is electronic transcription/translation of spoken language toprinted texts. The electronic translation of spoken language may be erroneous, or at times, nonsensical words or phrases may be inadvertently transcribed.   Although I have reviewed the note for such errors, some may stillexist.

## 2022-03-08 NOTE — LETTER
[facility] [address] [place] [address] [place] [address]  Phone: [phone]  Fax: [phone]    Yari Montoya MD        March 8, 2022     Patient: Tressa Acosta   YOB: 1980   Date of Visit: 3/8/2022       To Whom It May Concern: It is my medical opinion that Tressa Karla may return to work immediately with no restrictions. If you have any questions or concerns, please don't hesitate to call.     Sincerely,        Yari Montoya MD

## 2022-03-12 LAB
GRAM STAIN RESULT: ABNORMAL
ORGANISM: ABNORMAL
WOUND/ABSCESS: ABNORMAL

## 2022-03-16 DIAGNOSIS — F90.9 ATTENTION DEFICIT HYPERACTIVITY DISORDER (ADHD), UNSPECIFIED ADHD TYPE: ICD-10-CM

## 2022-03-16 DIAGNOSIS — F41.9 ANXIETY: ICD-10-CM

## 2022-03-17 DIAGNOSIS — F41.9 ANXIETY: ICD-10-CM

## 2022-03-17 RX ORDER — DEXTROAMPHETAMINE SACCHARATE, AMPHETAMINE ASPARTATE MONOHYDRATE, DEXTROAMPHETAMINE SULFATE AND AMPHETAMINE SULFATE 7.5; 7.5; 7.5; 7.5 MG/1; MG/1; MG/1; MG/1
30 CAPSULE, EXTENDED RELEASE ORAL DAILY
Qty: 30 CAPSULE | Refills: 0 | Status: SHIPPED | OUTPATIENT
Start: 2022-03-17 | End: 2022-04-18 | Stop reason: SDUPTHER

## 2022-03-17 RX ORDER — CLONAZEPAM 0.5 MG/1
0.5 TABLET ORAL 2 TIMES DAILY PRN
Qty: 45 TABLET | Refills: 0 | Status: SHIPPED | OUTPATIENT
Start: 2022-03-17 | End: 2022-04-18 | Stop reason: SDUPTHER

## 2022-03-17 RX ORDER — CLONAZEPAM 0.5 MG/1
TABLET ORAL
Qty: 45 TABLET | Refills: 0 | OUTPATIENT
Start: 2022-03-17

## 2022-03-17 RX ORDER — NORETHINDRONE ACETATE AND ETHINYL ESTRADIOL, ETHINYL ESTRADIOL AND FERROUS FUMARATE 1MG-10(24)
1 KIT ORAL DAILY
Qty: 28 TABLET | Refills: 3 | Status: SHIPPED | OUTPATIENT
Start: 2022-03-17 | End: 2022-08-12 | Stop reason: SDUPTHER

## 2022-03-17 NOTE — TELEPHONE ENCOUNTER
PDMP Monitoring:    Last PDMP [facility]in[facility] as Reviewed [facility]):  Review User Review Instant Review Result   SUKHWINDER SLADE 11/24/2021 10:24 AM Reviewed PDMP [1]     Urine Drug Screenings (1 yr)    No resulted procedures found.        Medication Contract and Consent for Opioid Use Documents Filed      No documents found

## 2022-03-17 NOTE — TELEPHONE ENCOUNTER
Provider needs to review PDMP    PDMP Monitoring:    Last PDMP [facility] SYSTEM as Reviewed [facility]):  Review User Review Instant Review Result   SUKHWINDER SLADE 11/24/2021 10:24 AM Reviewed PDMP [1]     Urine Drug Screenings (1 yr)    No resulted procedures found.        Medication Contract and Consent for Opioid Use Documents Filed      No documents found

## 2022-03-21 ENCOUNTER — OFFICE VISIT (OUTPATIENT)
Dept: PRIMARY CARE CLINIC | Age: 42
End: 2022-03-21
Payer: COMMERCIAL

## 2022-03-21 VITALS
SYSTOLIC BLOOD PRESSURE: 116 MMHG | HEART RATE: 76 BPM | OXYGEN SATURATION: 100 % | HEIGHT: 67 IN | TEMPERATURE: 96.7 F | RESPIRATION RATE: 16 BRPM | BODY MASS INDEX: 21.03 KG/M2 | WEIGHT: 134 LBS | DIASTOLIC BLOOD PRESSURE: 84 MMHG

## 2022-03-21 DIAGNOSIS — Q89.9 UMBILICAL ABNORMALITY: ICD-10-CM

## 2022-03-21 DIAGNOSIS — F90.9 ATTENTION DEFICIT HYPERACTIVITY DISORDER (ADHD), UNSPECIFIED ADHD TYPE: ICD-10-CM

## 2022-03-21 DIAGNOSIS — F41.9 ANXIETY: Primary | ICD-10-CM

## 2022-03-21 PROCEDURE — 99212 OFFICE O/P EST SF 10 MIN: CPT | Performed by: FAMILY MEDICINE

## 2022-03-21 ASSESSMENT — ENCOUNTER SYMPTOMS
BACK PAIN: 0
DIARRHEA: 0
WHEEZING: 0
EYE DISCHARGE: 0
ABDOMINAL PAIN: 0
VOMITING: 0
COUGH: 0
COLOR CHANGE: 0
NAUSEA: 0

## 2022-03-21 NOTE — PROGRESS NOTES
SUBJECTIVE:    Patient ID: Corby Spivey is a 46 y.o. female. HPI:   Patient is seen today for medication follow-up. She is on Adderall XR for ADHD and this is working well for her. She is also on Klonopin for anxiety. She has not had any change in the dosage of these recently. These worked well for her. Past Medical History:   Diagnosis Date    ADHD (attention deficit hyperactivity disorder)     Clotting disorder (HCC)     factor 14, 11    GERD (gastroesophageal reflux disease)     Headache       Current Outpatient Medications on File Prior to Visit   Medication Sig Dispense Refill    amphetamine-dextroamphetamine (ADDERALL XR) 30 MG extended release capsule Take 1 capsule by mouth daily for 30 days. 30 capsule 0    norethindrone-ethinyl estradiol-Fe (LO LOESTRIN FE) 1 MG-10 MCG / 10 MCG tablet Take 1 tablet by mouth daily 28 tablet 3    clonazePAM (KLONOPIN) 0.5 MG tablet Take 1 tablet by mouth 2 times daily as needed for Anxiety for up to 30 days. 45 tablet 0    ondansetron (ZOFRAN) 8 MG tablet Take 8 mg by mouth every 8 hours as needed for Nausea or Vomiting      promethazine (PHENERGAN) 25 MG tablet Take 25 mg by mouth every 6 hours as needed for Nausea      topiramate (TOPAMAX) 200 MG tablet Take 1 tablet by mouth 2 times daily 180 tablet 3    butalbital-acetaminophen-caffeine (FIORICET, ESGIC) -40 MG per tablet Take 1 tablet by mouth every 6 hours as needed for Headaches 45 tablet 0     No current facility-administered medications on file prior to visit. Allergies   Allergen Reactions    Iv Dye [Iodides] Itching     Itchy throat, redness    Red Dye Swelling     Throat swells    Shellfish-Derived Products Swelling     tongue and throat swells    Oxaprozin     Shrimp Flavor     Tyloxapol     Norco [Hydrocodone-Acetaminophen] Rash    Percocet [Oxycodone-Acetaminophen] Rash       Review of Systems   Constitutional: Negative for activity change, appetite change and fever. HENT: Negative for congestion and nosebleeds. Eyes: Negative for discharge. Respiratory: Negative for cough and wheezing. Cardiovascular: Negative for chest pain and leg swelling. Gastrointestinal: Negative for abdominal pain, diarrhea, nausea and vomiting. Genitourinary: Negative for difficulty urinating, frequency and urgency. Musculoskeletal: Negative for back pain and gait problem. Skin: Negative for color change and rash. Neurological: Negative for dizziness and headaches. Hematological: Does not bruise/bleed easily. Psychiatric/Behavioral: Negative for sleep disturbance and suicidal ideas. OBJECTIVE:    Physical Exam  Vitals reviewed. Constitutional:       General: She is not in acute distress. Appearance: Normal appearance. She is well-developed. She is not diaphoretic. HENT:      Head: Normocephalic and atraumatic. Right Ear: External ear normal.      Left Ear: External ear normal.   Cardiovascular:      Rate and Rhythm: Normal rate and regular rhythm. Pulses: Normal pulses. Heart sounds: Normal heart sounds. No murmur heard. Pulmonary:      Effort: Pulmonary effort is normal. No respiratory distress. Breath sounds: Normal breath sounds. Musculoskeletal:      Cervical back: Normal range of motion and neck supple. Skin:     General: Skin is warm and dry. Neurological:      General: No focal deficit present. Mental Status: She is alert and oriented to person, place, and time. Mental status is at baseline. Psychiatric:         Mood and Affect: Mood normal.         Behavior: Behavior normal.         Thought Content: Thought content normal.         Judgment: Judgment normal.        /84 (Site: Right Upper Arm, Position: Sitting, Cuff Size: Medium Adult)   Pulse 76   Temp 96.7 °F (35.9 °C) (Temporal)   Resp 16   Ht 5' 7\" (1.702 m)   Wt 134 lb (60.8 kg)   SpO2 100%   BMI 20.99 kg/m²      ASSESSMENT/PLAN:    1. Anxiety  2. Attention deficit hyperactivity disorder (ADHD), unspecified ADHD type  3. Umbilical abnormality       Continue Klonopin for anxiety. Continue Adderall XR for ADHD. Follow-up with us in 6 months for checkup unless needed sooner. PDMP Monitoring:    Last PDMP Christinajose Conard as Reviewed [facility]):  Review User Review Instant Review Result   FRED SLADESSA 11/24/2021 10:24 AM Reviewed PDMP [1]       Urine Drug Screenings (1 yr)    No resulted procedures found. Medication Contract and Consent for Opioid Use Documents Filed      No documents found                 EMR Dragon/transcription disclaimer:  Much of this encounter note is electronic transcription/translation of spoken language toprinted texts. The electronic translation of spoken language may be erroneous, or at times, nonsensical words or phrases may be inadvertently transcribed.   Although I have reviewed the note for such errors, some may stillexist.

## 2022-04-05 ENCOUNTER — PATIENT MESSAGE (OUTPATIENT)
Dept: PRIMARY CARE CLINIC | Age: 42
End: 2022-04-05

## 2022-04-05 RX ORDER — FLUCONAZOLE 100 MG/1
100 TABLET ORAL DAILY
Qty: 7 TABLET | Refills: 0 | Status: SHIPPED | OUTPATIENT
Start: 2022-04-05 | End: 2022-04-12

## 2022-04-05 RX ORDER — CLOTRIMAZOLE AND BETAMETHASONE DIPROPIONATE 10; .64 MG/G; MG/G
CREAM TOPICAL
Qty: 30 G | Refills: 0 | Status: SHIPPED | OUTPATIENT
Start: 2022-04-05 | End: 2022-09-22

## 2022-04-05 RX ORDER — SULFAMETHOXAZOLE AND TRIMETHOPRIM 800; 160 MG/1; MG/1
1 TABLET ORAL 2 TIMES DAILY
Qty: 20 TABLET | Refills: 0 | Status: SHIPPED | OUTPATIENT
Start: 2022-04-05 | End: 2022-04-15

## 2022-04-05 NOTE — TELEPHONE ENCOUNTER
From: Nirmal Calhoun  To: Dr. Jono Dill: 4/5/2022 12:18 PM CDT  Subject: My bellybutton     My bellybutton is irritated again can we send some medicine to the pharmacy Im leaving to go out of town

## 2022-04-11 ENCOUNTER — HOSPITAL ENCOUNTER (OUTPATIENT)
Dept: PAIN MANAGEMENT | Age: 42
Discharge: HOME | End: 2022-04-11
Payer: COMMERCIAL

## 2022-04-11 VITALS
DIASTOLIC BLOOD PRESSURE: 55 MMHG | TEMPERATURE: 96.5 F | OXYGEN SATURATION: 92 % | SYSTOLIC BLOOD PRESSURE: 94 MMHG | HEART RATE: 109 BPM | RESPIRATION RATE: 16 BRPM

## 2022-04-11 DIAGNOSIS — G43.719 CHRONIC MIGRAINE WITHOUT AURA, INTRACTABLE, WITHOUT STATUS MIGRAINOSUS: ICD-10-CM

## 2022-04-11 PROCEDURE — 64615 CHEMODENERV MUSC MIGRAINE: CPT

## 2022-04-11 PROCEDURE — 2580000003 HC RX 258

## 2022-04-11 PROCEDURE — A4216 STERILE WATER/SALINE, 10 ML: HCPCS

## 2022-04-11 PROCEDURE — 64615 CHEMODENERV MUSC MIGRAINE: CPT | Performed by: PSYCHIATRY & NEUROLOGY

## 2022-04-11 RX ORDER — SODIUM CHLORIDE 0.9 % (FLUSH) 0.9 %
5 SYRINGE (ML) INJECTION ONCE
Status: DISCONTINUED | OUTPATIENT
Start: 2022-04-11 | End: 2022-04-13 | Stop reason: HOSPADM

## 2022-04-11 NOTE — PROGRESS NOTES
Procedure:  Level of Consciousness: [x]Alert [x]Oriented []Disoriented []Lethargic  Anxiety Level: [x]Calm []Anxious []Depressed []Other  Skin: []Warm [x]Dry []Cool []Moist []Intact []Other  Cardiovascular: [x]Palpitations: [x]Never []Occasionally []Frequently  Chest Pain: [x]No []Yes  Respiratory:  [x]Unlabored []Labored []Cough ([] Productive []Unproductive)  HCG Required: [x]No []Yes   Results: []Negative []Positive  Knowledge Level:        [x]Patient/Other verbalized understanding of pre-procedure instructions. [x]Assessment of post-op care needs (transportation, responsible caregiver)        [x]Able to discuss health care problems and how to deal with it. Factors that Affect Teaching:        Language Barrier: [x]No []Yes - why:        Hearing Loss:        [x]No []Yes            Corrective Device:  []Yes []No        Vision Loss:           [x]No []Yes            Corrective Device:  []Yes []No        Memory Loss:       [x]No []Yes            []Short Term []Long Term  Motivational Level:  [x]Asks Questions                  []Extremely Anxious       [x]Seems Interested               []Seems Uninterested                  [x]Denies need for Education  Risk for Injury:  [x]Patient oriented to person, place and time  []History of frequent falls/loss of balance  Nutritional:  []Change in appetite   []Weight Gain   []Weight Loss  Functional:  []Requires assistance with ADL's      Botox Assessment  [] Patient  has had a reduction of at least 100 hours (5 Days) in Migraines since receiving Botox  []  []  []  Factors that Affect Teaching:  Assessment of post-op care needs (transportation, responsible caregiver)        []Able to discuss health care problems and how to deal with it. Factors that Affect Teaching:Patient states that he/she has _18_____ headaches out of 30 days each month.   Patient states that he/she has __4____ migraines out of 30 days each month.monthly

## 2022-04-11 NOTE — PROGRESS NOTES
[facility] Neurology Botox Procedure Note     Patient:   Connie Khanna  MR#:    [id number]  Account Number:                   [id number]      YOB: 1980  Date of Evaluation:  4/11/2022  Time of Note:                          3:30 PM  Primary Physician:    Lito Bray MD   Consulting Physician:  Oscar Youngblood DO    Consent was signed and on the chart. Risk, benefits, and side effects discussed. Pt has a clear history of having more than 15 days/month of migraine, lasting more than 4 hours with multiple treatment failures. Patient states that he/she has 18 headaches out of 30 days each month. Patient states that he/she has 4 migraines out of 30 days each month. Vial Exp Date: 7/23  Brisa Morley Lot Number:  [id number]    Botox was diluted with 0.9% NS to yield a final concentration of 50 units / 1 ml. The following muscles were injected in 0.1 ml (5 unit) increments:    -   5 units left, 5 units right  Procerus-      5 units  Frontalis-      20 units divided into 4 sites left and right  Temporalis-  40 units divided into 4 sites left and 4 sites right  Occipitalis-    30 units divided into 3 sites left and 3 sites right  Cervical Paraspinal-  20 units divided into 2 sites left and 2 sites right  Trapezius-     30 units divided into 3 sites left and 3 sites right    Total units injected: 155  Total units unavoidably discarded: 45    Pt tolerated the procedure well. There were no complications. Pt will follow up in 6 weeks to assess effectiveness and will repeat injections in 12 weeks if continues to benefit.         Oscar Youngblood DO  Board Certified Neurologist

## 2022-04-18 DIAGNOSIS — F41.9 ANXIETY: ICD-10-CM

## 2022-04-18 DIAGNOSIS — F90.9 ATTENTION DEFICIT HYPERACTIVITY DISORDER (ADHD), UNSPECIFIED ADHD TYPE: ICD-10-CM

## 2022-04-18 RX ORDER — DEXTROAMPHETAMINE SACCHARATE, AMPHETAMINE ASPARTATE MONOHYDRATE, DEXTROAMPHETAMINE SULFATE AND AMPHETAMINE SULFATE 7.5; 7.5; 7.5; 7.5 MG/1; MG/1; MG/1; MG/1
30 CAPSULE, EXTENDED RELEASE ORAL DAILY
Qty: 30 CAPSULE | Refills: 0 | Status: SHIPPED | OUTPATIENT
Start: 2022-04-18 | End: 2022-05-17 | Stop reason: SDUPTHER

## 2022-04-18 RX ORDER — CLONAZEPAM 0.5 MG/1
0.5 TABLET ORAL 2 TIMES DAILY PRN
Qty: 45 TABLET | Refills: 0 | Status: SHIPPED | OUTPATIENT
Start: 2022-04-18 | End: 2022-05-17 | Stop reason: SDUPTHER

## 2022-04-18 NOTE — TELEPHONE ENCOUNTER
Provider needs to review PDMP    PDMP Monitoring:    Last PDMP Nelida Phillips as Reviewed [facility]):  Review User Review Instant Review Result   SUKHWINDER SLADE 11/24/2021 10:24 AM Reviewed PDMP [1]     Urine Drug Screenings (1 yr)    No resulted procedures found.        Medication Contract and Consent for Opioid Use Documents Filed      No documents found

## 2022-05-09 ENCOUNTER — TELEPHONE (OUTPATIENT)
Dept: PRIMARY CARE CLINIC | Age: 42
End: 2022-05-09

## 2022-05-09 ENCOUNTER — PATIENT MESSAGE (OUTPATIENT)
Dept: PRIMARY CARE CLINIC | Age: 42
End: 2022-05-09

## 2022-05-09 NOTE — LETTER
[place] [address] [address] [address]  Phone: [phone]  Fax: [phone]    Mamadou Concepcion MD        May 10, 2022    Deborah Hunter  [address] [place] [address] [address] [address]      Dear Aelx Estevez:    Alex Estevez is a patient of mine and has a family history of a clotting disorder. She has never had a blood clot. I feel that she would be safe to undergo surgery. If you have any questions or concerns, please don't hesitate to call.     Sincerely,        Mamadou Concepcion MD

## 2022-05-09 NOTE — TELEPHONE ENCOUNTER
Pt states she has chosen to have Breast Implants and states she needs a letter faxed to Dr. Kina Hamilton in [facility] [phone] fax stating it is alright for her to have this Surgery with her Clotting issues.  That she has had in the past.

## 2022-05-09 NOTE — TELEPHONE ENCOUNTER
She will probably need to be placed on a blood thinner after her surgery. Has she had blood clots previously was this just a family history of blood clots?

## 2022-05-10 ENCOUNTER — OFFICE VISIT (OUTPATIENT)
Age: 42
End: 2022-05-10
Payer: COMMERCIAL

## 2022-05-10 VITALS
TEMPERATURE: 97.3 F | HEART RATE: 73 BPM | OXYGEN SATURATION: 98 % | BODY MASS INDEX: 21.22 KG/M2 | SYSTOLIC BLOOD PRESSURE: 128 MMHG | RESPIRATION RATE: 22 BRPM | WEIGHT: 135.2 LBS | DIASTOLIC BLOOD PRESSURE: 60 MMHG | HEIGHT: 67 IN

## 2022-05-10 DIAGNOSIS — I80.02 THROMBOPHLEBITIS OF SUPERFICIAL VEINS OF LEFT LOWER EXTREMITY: Primary | ICD-10-CM

## 2022-05-10 PROCEDURE — 99213 OFFICE O/P EST LOW 20 MIN: CPT | Performed by: PHYSICIAN ASSISTANT

## 2022-05-10 ASSESSMENT — ENCOUNTER SYMPTOMS
ABDOMINAL PAIN: 0
DIARRHEA: 0
EYE PAIN: 0
SHORTNESS OF BREATH: 0
SINUS PAIN: 0
ALLERGIC/IMMUNOLOGIC NEGATIVE: 1
VOMITING: 0
SINUS PRESSURE: 0
COLOR CHANGE: 1
SORE THROAT: 0
COUGH: 0
NAUSEA: 0

## 2022-05-10 NOTE — PATIENT INSTRUCTIONS
Patient Education        Superficial Thrombophlebitis: Care Instructions  Your Care Instructions  Superficial thrombophlebitis is inflammation in a vein where a blood clot has formed close to the surface of the skin. You may be able to feel the clot as a firm lump under the skin. The skin over the clot can become red, tender, and warm to the touch. Blood clots in veins close to the skin's surface usually arenot serious and often can be treated at home. Sometimes superficial thrombophlebitis spreads to a deeper vein (deep vein thrombosis, or DVT). These deeper clots can be serious, even life-threatening. It is very important that you follow your doctor's instructions, keep allfollow-up appointments, and watch for new or worsening symptoms of a clot. Follow-up care is a key part of your treatment and safety. Be sure to make and go to all appointments, and call your doctor if you are having problems. It's also a good idea to know your test results and keep alist of the medicines you take. How can you care for yourself at home?  Take your medicines exactly as prescribed. Call your doctor if you think you are having a problem with your medicine. You will get more details on the specific medicines your doctor prescribes.  Prop up the sore leg or arm on a pillow anytime you sit or lie down. Try to keep it above the level of your heart. To prevent thrombophlebitis   Exercise. Keep blood moving in your legs to keep new clots from forming.  Get up out of bed as soon as possible after an illness or surgery.  Do not smoke. If you need help quitting, talk to your doctor about stop-smoking programs and medicines. These can increase your chances of quitting for good.  Ask your doctor about compression stockings. These may help prevent blood clots from forming in your legs. But there are different types of stockings, and they need to fit right. So your doctor will recommend what you need.   When should you call for help?   Call 911 anytime you think you may need emergency care. For example, call if:     You have sudden chest pain and shortness of breath, or you cough up blood.      You vomit blood or what looks like coffee grounds.      You pass maroon or very bloody stools.      You passed out (lost consciousness). Call your doctor now or seek immediate medical care if:     You have signs of a blood clot, such as:  ? Pain in your calf, back of the knee, thigh, or groin. ? Redness and swelling in your leg or groin.      You notice a new hard, red, or tender area in your leg.      Your stools are black and tarlike or have streaks of blood. Watch closely for changes in your health, and be sure to contact your doctor if:     You do not get better as expected. Where can you learn more? Go to https://[employer]. org and sign in to your [employer] account. Enter [phone] in the [employer] box to learn more about \"Superficial Thrombophlebitis: Care Instructions. \"     If you do not have an account, please click on the \"Sign Up Now\" link. Current as of: July 6, 2021               Content Version: 13.2  © 2006-2022 [employer], [employer]. Care instructions adapted under license by [facility]Â® [facility] ([facility]). If you have questions about a medical condition or this instruction, always ask your healthcare professional. [place] [address] any warranty or liability for your use of this information. 1. Warm compress. Elevate lower extremities  2. Note any spreading of discoloration, streaking, warmth, pain, swelling and return to clinic, PCP or ER if worsening or having concerns.

## 2022-05-10 NOTE — TELEPHONE ENCOUNTER
From: Aaron Sheffield  To: Dr. Sanchez [place]: 5/9/2022 9:58 PM CDT  Subject: Having an elective surgery     I need a letter stating that [address] to have an Implant surgery with my clotting disorder. I dont clot and Silvae never had an issue with any other surgery Christiano had. Dr. Iris De Leon just wants to make sure everything will be ok per my Dr. Jose De Jesus Caba can fax it at [phone].  If you have questions you can call me at [phone]

## 2022-05-10 NOTE — PROGRESS NOTES
[address]  [address] [address] [place] [address]  Dept: [phone]  Dept Fax: [phone]  Loc: [phone]    Yassine Hansen is a 46 y.o. female who presents today for her medical conditions/complaints as noted below. Yassine Hansen is complaining of Skin Discoloration (LLE)    HPI:   Ms. Naresh Carbajal present to clinic today with concerns of a rash on her left lateral lower extremity. Pt states that she has a bleeding disorder and a breast augmentation coming up next week so she is wanting to be sure that she doesn't have a clot or infection. Pt states that she noted the discoloration this morning and it has slowly gotten a little higher on her calf. There is no warm, induration, or swelling to LLE. It is superficial lacy like pattern with no rough/raised areas. She denies itchiness, change in lotion, soaps or detergents.        Past Medical History:   Diagnosis Date    ADHD (attention deficit hyperactivity disorder)     Clotting disorder (HCC)     factor 14, 11    GERD (gastroesophageal reflux disease)     Headache        Past Surgical History:   Procedure Laterality Date    CHOLECYSTECTOMY      COLONOSCOPY  2005    \"NORMAL\" PER PT RECALL    COLONOSCOPY  2019 [address] [facility], [place]- \" normal\" per pt recall    MT COLONOSCOPY FLX DX W/COLLJ SPEC WHEN PFRMD N/A 7/23/2018    Dr Christ Menezes, 15 yr (age 50) recall    MT EGD TRANSORAL BIOPSY SINGLE/MULTIPLE N/A 7/23/2018    Dr Jemma Hopkins (-) Cathy, (+) Barrettt's, (-) dysplasia--1st dx--1 yr recall   [facility] ENDOSCOPY  2005    \"NORMAL\" PER PT RECALL    UPPER GASTROINTESTINAL ENDOSCOPY  2019 [address] [facility], [place]- \"normal\" per pt recall    UPPER GASTROINTESTINAL ENDOSCOPY N/A 9/30/2020    Dr Monteiro Home exam,  NEG EoE, Neg Barretts, no recall       Family History   Problem Relation Age of Onset    Esophageal Cancer Paternal Uncle     Colon Cancer Neg Hx  Colon Polyps Neg Hx     Liver Cancer Neg Hx     Liver Disease Neg Hx     Stomach Cancer Neg Hx     Rectal Cancer Neg Hx        Social History     Tobacco Use    Smoking status: Former Smoker     Types: Cigarettes    Smokeless tobacco: Never Used   Substance Use Topics    Alcohol use: Yes     Comment: every week-end        Current Outpatient Medications   Medication Sig Dispense Refill    amphetamine-dextroamphetamine (ADDERALL XR) 30 MG extended release capsule Take 1 capsule by mouth daily for 30 days. 30 capsule 0    clonazePAM (KLONOPIN) 0.5 MG tablet Take 1 tablet by mouth 2 times daily as needed for Anxiety for up to 30 days. 45 tablet 0    clotrimazole-betamethasone (LOTRISONE) 1-0.05 % cream Apply topically 2 times daily. 30 g 0    norethindrone-ethinyl estradiol-Fe (LO LOESTRIN FE) 1 MG-10 MCG / 10 MCG tablet Take 1 tablet by mouth daily 28 tablet 3    ondansetron (ZOFRAN) 8 MG tablet Take 8 mg by mouth every 8 hours as needed for Nausea or Vomiting      promethazine (PHENERGAN) 25 MG tablet Take 25 mg by mouth every 6 hours as needed for Nausea      topiramate (TOPAMAX) 200 MG tablet Take 1 tablet by mouth 2 times daily 180 tablet 3    butalbital-acetaminophen-caffeine (FIORICET, ESGIC) -40 MG per tablet Take 1 tablet by mouth every 6 hours as needed for Headaches 45 tablet 0     No current facility-administered medications for this visit.        Allergies   Allergen Reactions    Iv Dye [Iodides] Itching     Itchy throat, redness    Red Dye Swelling     Throat swells    Shellfish-Derived Products Swelling     tongue and throat swells    Oxaprozin     Shrimp Flavor     Tyloxapol     Norco [Hydrocodone-Acetaminophen] Rash    Percocet [Oxycodone-Acetaminophen] Rash       Health Maintenance   Topic Date Due    COVID-19 Vaccine (1) Never done    Hepatitis C screen  Never done    Depression Screen  05/17/2022    Flu vaccine (Season Ended) 09/01/2022    Cervical cancer screen 11/20/2022    DTaP/Tdap/Td vaccine (2 - Td or Tdap) 07/11/2024    Colorectal Cancer Screen  07/23/2032    HIV screen  Completed    Hepatitis A vaccine  Aged Out    Hepatitis B vaccine  Aged Out    Hib vaccine  Aged Out    Meningococcal (ACWY) vaccine  Aged Out    Pneumococcal 0-64 years Vaccine  Aged Out    Varicella vaccine  Discontinued       Subjective:   Review of Systems   Constitutional: Negative for chills, fatigue and fever. HENT: Negative for congestion, postnasal drip, sinus pressure, sinus pain and sore throat. Eyes: Negative for pain and visual disturbance. Respiratory: Negative for cough and shortness of breath. Cardiovascular: Negative for chest pain. Gastrointestinal: Negative for abdominal pain, diarrhea, nausea and vomiting. Endocrine: Negative for cold intolerance and heat intolerance. Genitourinary: Negative for frequency, hematuria and urgency. Musculoskeletal: Negative for myalgias. Skin: Positive for color change. Negative for rash. Allergic/Immunologic: Negative. Neurological: Negative for syncope, weakness, light-headedness and headaches. Hematological: Negative. Psychiatric/Behavioral: Negative. Objective    Physical Exam  Constitutional:       General: She is not in acute distress. Appearance: Normal appearance. HENT:      Head: Normocephalic and atraumatic. Right Ear: External ear normal.      Left Ear: External ear normal.      Nose: Nose normal.      Mouth/Throat:      Mouth: Mucous membranes are moist.      Pharynx: Oropharynx is clear. Eyes:      Extraocular Movements: Extraocular movements intact. Conjunctiva/sclera: Conjunctivae normal.   Cardiovascular:      Rate and Rhythm: Normal rate and regular rhythm. Pulses: Normal pulses. Heart sounds: Normal heart sounds. Pulmonary:      Effort: Pulmonary effort is normal.      Breath sounds: Normal breath sounds. No wheezing.    Abdominal:      General: Abdomen is flat. Bowel sounds are normal. There is no distension. Palpations: Abdomen is soft. Tenderness: There is no abdominal tenderness. Musculoskeletal:         General: Normal range of motion. Cervical back: Normal range of motion and neck supple. No tenderness. Skin:     General: Skin is warm and dry. Findings: No erythema. Comments: Lower extremity pulse, sensory and motor function in tact. No swelling to LLE. No pain to LLE. Neurological:      General: No focal deficit present. Mental Status: She is alert and oriented to person, place, and time. Psychiatric:         Mood and Affect: Mood normal.         Behavior: Behavior normal.         /60   Pulse 73   Temp 97.3 °F (36.3 °C)   Resp 22   Ht 5' 7\" (1.702 m)   Wt 135 lb 3.2 oz (61.3 kg)   SpO2 98%   BMI 21.18 kg/m²     Assessment         Diagnosis Orders   1. Thrombophlebitis of superficial veins of left lower extremity         Plan   1. Warm compress. Elevate lower extremities  2. Note any spreading of discoloration, streaking, warmth, pain, swelling and return to clinic, PCP or ER if worsening or having concerns. No orders of the defined types were placed in this encounter. No results found for this visit on 05/10/22. No orders of the defined types were placed in this encounter. New Prescriptions    No medications on file        Return if symptoms worsen or fail to improve. Discussed use, benefits, and side effects of any prescribed medications. All patient questions were answered. Patient voiced understanding of care plan. Patient was given educational materials - see patient instructions below. Patient Instructions       Patient Education        Superficial Thrombophlebitis: Care Instructions  Your Care Instructions  Superficial thrombophlebitis is inflammation in a vein where a blood clot has formed close to the surface of the skin.  You may be able to feel the clot as a firm lump under the skin. The skin over the clot can become red, tender, and warm to the touch. Blood clots in veins close to the skin's surface usually arenot serious and often can be treated at home. Sometimes superficial thrombophlebitis spreads to a deeper vein (deep vein thrombosis, or DVT). These deeper clots can be serious, even life-threatening. It is very important that you follow your doctor's instructions, keep allfollow-up appointments, and watch for new or worsening symptoms of a clot. Follow-up care is a key part of your treatment and safety. Be sure to make and go to all appointments, and call your doctor if you are having problems. It's also a good idea to know your test results and keep alist of the medicines you take. How can you care for yourself at home?  Take your medicines exactly as prescribed. Call your doctor if you think you are having a problem with your medicine. You will get more details on the specific medicines your doctor prescribes.  Prop up the sore leg or arm on a pillow anytime you sit or lie down. Try to keep it above the level of your heart. To prevent thrombophlebitis   Exercise. Keep blood moving in your legs to keep new clots from forming.  Get up out of bed as soon as possible after an illness or surgery.  Do not smoke. If you need help quitting, talk to your doctor about stop-smoking programs and medicines. These can increase your chances of quitting for good.  Ask your doctor about compression stockings. These may help prevent blood clots from forming in your legs. But there are different types of stockings, and they need to fit right. So your doctor will recommend what you need. When should you call for help? Call 911 anytime you think you may need emergency care.  For example, call if:     You have sudden chest pain and shortness of breath, or you cough up blood.      You vomit blood or what looks like coffee grounds.      You pass maroon or very bloody stools.      You passed out (lost consciousness). Call your doctor now or seek immediate medical care if:     You have signs of a blood clot, such as:  ? Pain in your calf, back of the knee, thigh, or groin. ? Redness and swelling in your leg or groin.      You notice a new hard, red, or tender area in your leg.      Your stools are black and tarlike or have streaks of blood. Watch closely for changes in your health, and be sure to contact your doctor if:     You do not get better as expected. Where can you learn more? Go to [email or web address] org and sign in to your [facility] account. Enter [phone] in the Wiper box to learn more about \"Superficial Thrombophlebitis: Care Instructions. \"     If you do not have an account, please click on the \"Sign Up Now\" link. Current as of: July 6, 2021               Content Version: 13.2  © 2006-2022 [employer], [employer]. Care instructions adapted under license by [facility] . If you have questions about a medical condition or this instruction, always ask your healthcare professional. [address] [address] any warranty or liability for your use of this information. 1. Warm compress. Elevate lower extremities  2. Note any spreading of discoloration, streaking, warmth, pain, swelling and return to clinic, PCP or ER if worsening or having concerns.          Electronically signed by Pete Self PA-C on 5/10/2022 at 2:41 PM

## 2022-05-16 LAB
COLLAGEN EPINEPHRINE TIME: 113 SEC (ref 84–176)
PLATELET FUNCTION INTERPRETATION: NORMAL

## 2022-05-17 DIAGNOSIS — F41.9 ANXIETY: ICD-10-CM

## 2022-05-17 DIAGNOSIS — F90.9 ATTENTION DEFICIT HYPERACTIVITY DISORDER (ADHD), UNSPECIFIED ADHD TYPE: ICD-10-CM

## 2022-05-17 RX ORDER — DEXTROAMPHETAMINE SACCHARATE, AMPHETAMINE ASPARTATE MONOHYDRATE, DEXTROAMPHETAMINE SULFATE AND AMPHETAMINE SULFATE 7.5; 7.5; 7.5; 7.5 MG/1; MG/1; MG/1; MG/1
30 CAPSULE, EXTENDED RELEASE ORAL DAILY
Qty: 30 CAPSULE | Refills: 0 | Status: SHIPPED | OUTPATIENT
Start: 2022-05-17 | End: 2022-06-16 | Stop reason: SDUPTHER

## 2022-05-17 RX ORDER — CLONAZEPAM 0.5 MG/1
0.5 TABLET ORAL 2 TIMES DAILY PRN
Qty: 45 TABLET | Refills: 0 | Status: SHIPPED | OUTPATIENT
Start: 2022-05-17 | End: 2022-06-16 | Stop reason: SDUPTHER

## 2022-05-17 NOTE — TELEPHONE ENCOUNTER
PDMP Monitoring:    Last PDMP Damian Neighbours as Reviewed [facility]):  Review User Review Instant Review Result   SUKHWINDER SLADE 4/18/2022 12:34 PM Reviewed PDMP [1]     Urine Drug Screenings (1 yr)    No resulted procedures found.        Medication Contract and Consent for Opioid Use Documents Filed      No documents found

## 2022-06-07 DIAGNOSIS — R51.9 ACUTE NONINTRACTABLE HEADACHE, UNSPECIFIED HEADACHE TYPE: ICD-10-CM

## 2022-06-07 RX ORDER — BUTALBITAL, ACETAMINOPHEN AND CAFFEINE 50; 325; 40 MG/1; MG/1; MG/1
1 TABLET ORAL EVERY 6 HOURS PRN
Qty: 45 TABLET | Refills: 0 | Status: SHIPPED | OUTPATIENT
Start: 2022-06-07 | End: 2022-07-05 | Stop reason: SDUPTHER

## 2022-06-07 NOTE — TELEPHONE ENCOUNTER
PDMP Monitoring:    Last PDMP Mark Khalil as Reviewed [facility]):  Review User Review Instant Review Result   SUKHWINDER SLADE 4/18/2022 12:34 PM Reviewed PDMP [1]     Urine Drug Screenings (1 yr)    No resulted procedures found.        Medication Contract and Consent for Opioid Use Documents Filed      No documents found

## 2022-06-15 ENCOUNTER — HOSPITAL ENCOUNTER (EMERGENCY)
Age: 42
Discharge: HOME | End: 2022-06-15
Payer: COMMERCIAL

## 2022-06-15 ENCOUNTER — APPOINTMENT (OUTPATIENT)
Dept: CT IMAGING | Age: 42
End: 2022-06-15
Payer: COMMERCIAL

## 2022-06-15 ENCOUNTER — OFFICE VISIT (OUTPATIENT)
Dept: PRIMARY CARE CLINIC | Age: 42
End: 2022-06-15
Payer: COMMERCIAL

## 2022-06-15 VITALS
TEMPERATURE: 98.2 F | WEIGHT: 137.8 LBS | DIASTOLIC BLOOD PRESSURE: 78 MMHG | HEART RATE: 75 BPM | OXYGEN SATURATION: 99 % | HEIGHT: 67 IN | RESPIRATION RATE: 16 BRPM | SYSTOLIC BLOOD PRESSURE: 102 MMHG | BODY MASS INDEX: 21.63 KG/M2

## 2022-06-15 VITALS
HEART RATE: 76 BPM | OXYGEN SATURATION: 97 % | TEMPERATURE: 98.4 F | RESPIRATION RATE: 18 BRPM | DIASTOLIC BLOOD PRESSURE: 78 MMHG | SYSTOLIC BLOOD PRESSURE: 109 MMHG

## 2022-06-15 DIAGNOSIS — R50.9 FEVER, UNSPECIFIED FEVER CAUSE: ICD-10-CM

## 2022-06-15 DIAGNOSIS — G44.52 NEW DAILY PERSISTENT HEADACHE: Primary | ICD-10-CM

## 2022-06-15 DIAGNOSIS — G43.709 CHRONIC MIGRAINE WITHOUT AURA WITHOUT STATUS MIGRAINOSUS, NOT INTRACTABLE: Primary | ICD-10-CM

## 2022-06-15 LAB
BASOPHILS ABSOLUTE: 0 K/UL (ref 0–0.2)
BASOPHILS RELATIVE PERCENT: 0.5 % (ref 0–1)
EOSINOPHILS ABSOLUTE: 0 K/UL (ref 0–0.6)
EOSINOPHILS RELATIVE PERCENT: 0.7 % (ref 0–5)
HCT VFR BLD CALC: 40.4 % (ref 37–47)
HEMOGLOBIN: 13 G/DL (ref 12–16)
IMMATURE GRANULOCYTES #: 0 K/UL
LYMPHOCYTES ABSOLUTE: 1.1 K/UL (ref 1.1–4.5)
LYMPHOCYTES RELATIVE PERCENT: 26.8 % (ref 20–40)
MCH RBC QN AUTO: 31.7 PG (ref 27–31)
MCHC RBC AUTO-ENTMCNC: 32.2 G/DL (ref 33–37)
MCV RBC AUTO: 98.5 FL (ref 81–99)
MONOCYTES ABSOLUTE: 0.3 K/UL (ref 0–0.9)
MONOCYTES RELATIVE PERCENT: 6.7 % (ref 0–10)
NEUTROPHILS ABSOLUTE: 2.7 K/UL (ref 1.5–7.5)
NEUTROPHILS RELATIVE PERCENT: 65.1 % (ref 50–65)
PDW BLD-RTO: 13 % (ref 11.5–14.5)
PLATELET # BLD: 127 K/UL (ref 130–400)
PMV BLD AUTO: 10.6 FL (ref 9.4–12.3)
RBC # BLD: 4.1 M/UL (ref 4.2–5.4)
SARS-COV-2, PCR: NOT DETECTED
SEDIMENTATION RATE, ERYTHROCYTE: 2 MM/HR (ref 0–20)
WBC # BLD: 4.2 K/UL (ref 4.8–10.8)

## 2022-06-15 PROCEDURE — 70450 CT HEAD/BRAIN W/O DYE: CPT | Performed by: RADIOLOGY

## 2022-06-15 PROCEDURE — 70450 CT HEAD/BRAIN W/O DYE: CPT

## 2022-06-15 PROCEDURE — 99284 EMERGENCY DEPT VISIT MOD MDM: CPT

## 2022-06-15 PROCEDURE — 99213 OFFICE O/P EST LOW 20 MIN: CPT | Performed by: FAMILY MEDICINE

## 2022-06-15 RX ORDER — KETOROLAC TROMETHAMINE 30 MG/ML
60 INJECTION, SOLUTION INTRAMUSCULAR; INTRAVENOUS ONCE
Status: COMPLETED | OUTPATIENT
Start: 2022-06-15 | End: 2022-06-15

## 2022-06-15 RX ORDER — TIZANIDINE 4 MG/1
4 TABLET ORAL EVERY 6 HOURS PRN
Qty: 20 TABLET | Refills: 0 | Status: SHIPPED | OUTPATIENT
Start: 2022-06-15 | End: 2022-10-20

## 2022-06-15 RX ADMIN — KETOROLAC TROMETHAMINE 60 MG: 30 INJECTION, SOLUTION INTRAMUSCULAR; INTRAVENOUS at 10:05

## 2022-06-15 ASSESSMENT — PATIENT HEALTH QUESTIONNAIRE - PHQ9
1. LITTLE INTEREST OR PLEASURE IN DOING THINGS: 0
SUM OF ALL RESPONSES TO PHQ QUESTIONS 1-9: 0
SUM OF ALL RESPONSES TO PHQ QUESTIONS 1-9: 0
SUM OF ALL RESPONSES TO PHQ9 QUESTIONS 1 & 2: 0
2. FEELING DOWN, DEPRESSED OR HOPELESS: 0
SUM OF ALL RESPONSES TO PHQ QUESTIONS 1-9: 0
SUM OF ALL RESPONSES TO PHQ QUESTIONS 1-9: 0

## 2022-06-15 ASSESSMENT — ENCOUNTER SYMPTOMS
SINUS PRESSURE: 0
BACK PAIN: 0
COUGH: 0
WHEEZING: 0
FACIAL SWELLING: 0
SINUS PAIN: 0
DIARRHEA: 0
NAUSEA: 0
EYE DISCHARGE: 0
SORE THROAT: 0
COLOR CHANGE: 0
ABDOMINAL PAIN: 0
SHORTNESS OF BREATH: 0
COLOR CHANGE: 0
ABDOMINAL PAIN: 0
VOMITING: 0
EYE PAIN: 0

## 2022-06-15 ASSESSMENT — PAIN DESCRIPTION - ORIENTATION: ORIENTATION: LEFT

## 2022-06-15 ASSESSMENT — PAIN - FUNCTIONAL ASSESSMENT: PAIN_FUNCTIONAL_ASSESSMENT: 0-10

## 2022-06-15 ASSESSMENT — PAIN DESCRIPTION - LOCATION: LOCATION: HEAD

## 2022-06-15 ASSESSMENT — PAIN DESCRIPTION - PAIN TYPE: TYPE: ACUTE PAIN

## 2022-06-15 ASSESSMENT — PAIN SCALES - GENERAL: PAINLEVEL_OUTOF10: 8

## 2022-06-15 NOTE — PROGRESS NOTES
SUBJECTIVE:    Patient ID: Kimberly Cruz is a 46 y.o. female. HPI:   Patient is seen today for problems with a left-sided headache. She states that she does have a history of migraines. She states that she developed this a couple of days ago. She states that she has not had any vomiting. She denies any numbness or tingling of her face. She denies any vision changes. She states that she did have a fever and chills last week as well as some body aches. She states that she has not had any sinus symptoms this week. She states that she just has not felt great. She has had some chills and sweats. She states that she has tried taking over-the-counter medications without much improvement in her symptoms. She also tried taking her Fioricet without much improvement in her symptoms. Past Medical History:   Diagnosis Date    ADHD (attention deficit hyperactivity disorder)     Clotting disorder (HCC)     factor 14, 11    GERD (gastroesophageal reflux disease)     Headache       Current Outpatient Medications on File Prior to Visit   Medication Sig Dispense Refill    OnabotulinumtoxinA (BOTOX IJ) Inject as directed Every 3 months for headaches      butalbital-acetaminophen-caffeine (FIORICET, ESGIC) -40 MG per tablet TAKE 1 TABLET BY MOUTH EVERY 6 HOURS AS NEEDED FOR HEADACHES 45 tablet 0    amphetamine-dextroamphetamine (ADDERALL XR) 30 MG extended release capsule Take 1 capsule by mouth daily for 30 days. 30 capsule 0    clonazePAM (KLONOPIN) 0.5 MG tablet Take 1 tablet by mouth 2 times daily as needed for Anxiety for up to 30 days. 45 tablet 0    clotrimazole-betamethasone (LOTRISONE) 1-0.05 % cream Apply topically 2 times daily.  30 g 0    norethindrone-ethinyl estradiol-Fe (LO LOESTRIN FE) 1 MG-10 MCG / 10 MCG tablet Take 1 tablet by mouth daily 28 tablet 3    ondansetron (ZOFRAN) 8 MG tablet Take 8 mg by mouth every 8 hours as needed for Nausea or Vomiting      promethazine (PHENERGAN) 25 MG tablet Take 25 mg by mouth every 6 hours as needed for Nausea      topiramate (TOPAMAX) 200 MG tablet Take 1 tablet by mouth 2 times daily 180 tablet 3     No current facility-administered medications on file prior to visit. Allergies   Allergen Reactions    Iv Dye [Iodides] Itching     Itchy throat, redness    Red Dye Swelling     Throat swells    Shellfish-Derived Products Swelling     tongue and throat swells    Oxaprozin     Shrimp Flavor     Tyloxapol     Percocet [Oxycodone-Acetaminophen] Rash       Review of Systems   Constitutional: Negative for activity change, appetite change and fever. HENT: Negative for congestion and nosebleeds. Eyes: Negative for discharge. Respiratory: Negative for cough and wheezing. Cardiovascular: Negative for chest pain and leg swelling. Gastrointestinal: Negative for abdominal pain, diarrhea, nausea and vomiting. Genitourinary: Negative for difficulty urinating, frequency and urgency. Musculoskeletal: Negative for back pain and gait problem. Skin: Negative for color change and rash. Neurological: Negative for dizziness and headaches. Hematological: Does not bruise/bleed easily. Psychiatric/Behavioral: Negative for sleep disturbance and suicidal ideas. OBJECTIVE:    Physical Exam  Vitals reviewed. Constitutional:       General: She is not in acute distress. Appearance: Normal appearance. She is well-developed. She is not diaphoretic. HENT:      Head: Normocephalic and atraumatic. Right Ear: External ear normal.      Left Ear: External ear normal.   Cardiovascular:      Rate and Rhythm: Normal rate and regular rhythm. Pulses: Normal pulses. Heart sounds: Normal heart sounds. No murmur heard. Pulmonary:      Effort: Pulmonary effort is normal. No respiratory distress. Breath sounds: Normal breath sounds. Musculoskeletal:      Cervical back: Normal range of motion and neck supple.    Skin:     General: Skin is warm and dry. Neurological:      General: No focal deficit present. Mental Status: She is alert and oriented to person, place, and time. Mental status is at baseline. Psychiatric:         Mood and Affect: Mood normal.         Behavior: Behavior normal.         Thought Content: Thought content normal.         Judgment: Judgment normal.        /78   Pulse 75   Temp 98.2 °F (36.8 °C) (Temporal)   Resp 16   Ht 5' 7\" (1.702 m)   Wt 137 lb 12.8 oz (62.5 kg)   SpO2 99%   BMI 21.58 kg/m²      ASSESSMENT/PLAN:    1. New daily persistent headache  -     COVID-19  -     ketorolac (TORADOL) injection 60 mg; 60 mg, IntraMUSCular, ONCE, 1 dose, On Wed 6/15/22 at 1015Do not administer for more than 5 days.  -     Sedimentation Rate  -     CBC with Auto Differential  2. Fever, unspecified fever cause  -     COVID-19  -     Sedimentation Rate  -     CBC with Auto Differential     COVID swab was obtained today. Will notify results. Toradol injection was given in office today for the headache. We are going to obtain a sed rate and CBC due to the unilateral headache. We will notify her of the results of these. If the symptoms are not getting better she is to let us know we will look at doing a scan. PDMP Monitoring:    Last PDMP Jerome Alvarez as Reviewed [facility]):  Review User Review Instant Review Result   SUKHWINDER SLADE 4/18/2022 12:34 PM Reviewed PDMP [1]       Urine Drug Screenings (1 yr)    No resulted procedures found. Medication Contract and Consent for Opioid Use Documents Filed      No documents found                 EMR Dragon/transcription disclaimer:  Much of this encounter note is electronic transcription/translation of spoken language toprinted texts. The electronic translation of spoken language may be erroneous, or at times, nonsensical words or phrases may be inadvertently transcribed.   Although I have reviewed the note for such errors, some may stillexist.

## 2022-06-15 NOTE — PROGRESS NOTES
After obtaining consent, and per orders of Dr. Adrian Valentino, injection of Toradol 60 given in Left upper quad. gluteus by Sivakumar Arshad. Pt tolerated well.

## 2022-06-16 DIAGNOSIS — F90.9 ATTENTION DEFICIT HYPERACTIVITY DISORDER (ADHD), UNSPECIFIED ADHD TYPE: ICD-10-CM

## 2022-06-16 DIAGNOSIS — F41.9 ANXIETY: ICD-10-CM

## 2022-06-16 RX ORDER — CLONAZEPAM 0.5 MG/1
0.5 TABLET ORAL 2 TIMES DAILY PRN
Qty: 45 TABLET | Refills: 0 | Status: SHIPPED | OUTPATIENT
Start: 2022-06-16 | End: 2022-07-15 | Stop reason: SDUPTHER

## 2022-06-16 RX ORDER — DEXTROAMPHETAMINE SACCHARATE, AMPHETAMINE ASPARTATE MONOHYDRATE, DEXTROAMPHETAMINE SULFATE AND AMPHETAMINE SULFATE 7.5; 7.5; 7.5; 7.5 MG/1; MG/1; MG/1; MG/1
30 CAPSULE, EXTENDED RELEASE ORAL DAILY
Qty: 30 CAPSULE | Refills: 0 | Status: SHIPPED | OUTPATIENT
Start: 2022-06-16 | End: 2022-07-15 | Stop reason: SDUPTHER

## 2022-06-16 ASSESSMENT — ENCOUNTER SYMPTOMS
BLURRED VISION: 0
PHOTOPHOBIA: 1
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
## 2022-06-16 NOTE — TELEPHONE ENCOUNTER
PDMP Monitoring:    Last PDMP Merari Crow as Reviewed [facility]):  Review User Review Instant Review Result   SUKHWINDER SLADE 4/18/2022 12:34 PM Reviewed PDMP [1]     Urine Drug Screenings (1 yr)    No resulted procedures found.        Medication Contract and Consent for Opioid Use Documents Filed      No documents found

## 2022-06-23 ENCOUNTER — PATIENT MESSAGE (OUTPATIENT)
Dept: PRIMARY CARE CLINIC | Age: 42
End: 2022-06-23

## 2022-06-23 NOTE — TELEPHONE ENCOUNTER
From: Oksana Berg  To: Dr. Lia Queen: 6/23/2022 2:34 PM CDT  Subject: Appointment for headaches    Can I get in tomorrow early to get blood work to check my hormones to see if that what is causing these headaches. For run a full blood test of everything.

## 2022-06-27 DIAGNOSIS — R51.9 ACUTE NONINTRACTABLE HEADACHE, UNSPECIFIED HEADACHE TYPE: Primary | ICD-10-CM

## 2022-06-27 DIAGNOSIS — R53.83 FATIGUE, UNSPECIFIED TYPE: ICD-10-CM

## 2022-07-05 DIAGNOSIS — R51.9 ACUTE NONINTRACTABLE HEADACHE, UNSPECIFIED HEADACHE TYPE: ICD-10-CM

## 2022-07-05 RX ORDER — BUTALBITAL, ACETAMINOPHEN AND CAFFEINE 50; 325; 40 MG/1; MG/1; MG/1
1 TABLET ORAL EVERY 6 HOURS PRN
Qty: 45 TABLET | Refills: 0 | Status: SHIPPED | OUTPATIENT
Start: 2022-07-05 | End: 2022-08-19 | Stop reason: SDUPTHER

## 2022-07-05 NOTE — TELEPHONE ENCOUNTER
Provider needs to review PDMP    PDMP Monitoring:    Last PDMP Nicolas Osorio as Reviewed [facility]):  Review User Review Instant Review Result   SUKHWINDER SLADE 4/18/2022 12:34 PM Reviewed PDMP [1]     Urine Drug Screenings (1 yr)    No resulted procedures found.        Medication Contract and Consent for Opioid Use Documents Filed      No documents found

## 2022-07-13 ENCOUNTER — PATIENT MESSAGE (OUTPATIENT)
Dept: PRIMARY CARE CLINIC | Age: 42
End: 2022-07-13

## 2022-07-13 RX ORDER — AMOXICILLIN AND CLAVULANATE POTASSIUM 875; 125 MG/1; MG/1
1 TABLET, FILM COATED ORAL 2 TIMES DAILY
Qty: 14 TABLET | Refills: 0 | Status: SHIPPED | OUTPATIENT
Start: 2022-07-13 | End: 2022-07-20

## 2022-07-15 DIAGNOSIS — F41.9 ANXIETY: ICD-10-CM

## 2022-07-15 DIAGNOSIS — F90.9 ATTENTION DEFICIT HYPERACTIVITY DISORDER (ADHD), UNSPECIFIED ADHD TYPE: ICD-10-CM

## 2022-07-15 RX ORDER — CLONAZEPAM 0.5 MG/1
0.5 TABLET ORAL 2 TIMES DAILY PRN
Qty: 45 TABLET | Refills: 0 | Status: SHIPPED | OUTPATIENT
Start: 2022-07-15 | End: 2022-08-15 | Stop reason: SDUPTHER

## 2022-07-15 RX ORDER — DEXTROAMPHETAMINE SACCHARATE, AMPHETAMINE ASPARTATE MONOHYDRATE, DEXTROAMPHETAMINE SULFATE AND AMPHETAMINE SULFATE 7.5; 7.5; 7.5; 7.5 MG/1; MG/1; MG/1; MG/1
30 CAPSULE, EXTENDED RELEASE ORAL DAILY
Qty: 30 CAPSULE | Refills: 0 | Status: SHIPPED | OUTPATIENT
Start: 2022-07-15 | End: 2022-08-15 | Stop reason: SDUPTHER

## 2022-07-22 RX ORDER — ONDANSETRON HYDROCHLORIDE 8 MG/1
8 TABLET, FILM COATED ORAL EVERY 8 HOURS PRN
Qty: 20 TABLET | Refills: 1 | Status: SHIPPED | OUTPATIENT
Start: 2022-07-22 | End: 2022-09-08 | Stop reason: SDUPTHER

## 2022-08-12 RX ORDER — NORETHINDRONE ACETATE AND ETHINYL ESTRADIOL, ETHINYL ESTRADIOL AND FERROUS FUMARATE 1MG-10(24)
KIT ORAL
Qty: 28 TABLET | Refills: 0 | OUTPATIENT
Start: 2022-08-12

## 2022-08-12 RX ORDER — NORETHINDRONE ACETATE AND ETHINYL ESTRADIOL, ETHINYL ESTRADIOL AND FERROUS FUMARATE 1MG-10(24)
1 KIT ORAL DAILY
Qty: 28 TABLET | Refills: 3 | Status: SHIPPED | OUTPATIENT
Start: 2022-08-12 | End: 2022-09-22

## 2022-08-15 DIAGNOSIS — F90.9 ATTENTION DEFICIT HYPERACTIVITY DISORDER (ADHD), UNSPECIFIED ADHD TYPE: ICD-10-CM

## 2022-08-15 DIAGNOSIS — F41.9 ANXIETY: ICD-10-CM

## 2022-08-15 RX ORDER — CLONAZEPAM 0.5 MG/1
0.5 TABLET ORAL 2 TIMES DAILY PRN
Qty: 45 TABLET | Refills: 0 | Status: SHIPPED | OUTPATIENT
Start: 2022-08-15 | End: 2022-09-16 | Stop reason: SDUPTHER

## 2022-08-15 RX ORDER — DEXTROAMPHETAMINE SACCHARATE, AMPHETAMINE ASPARTATE MONOHYDRATE, DEXTROAMPHETAMINE SULFATE AND AMPHETAMINE SULFATE 7.5; 7.5; 7.5; 7.5 MG/1; MG/1; MG/1; MG/1
30 CAPSULE, EXTENDED RELEASE ORAL DAILY
Qty: 30 CAPSULE | Refills: 0 | Status: SHIPPED | OUTPATIENT
Start: 2022-08-15 | End: 2022-09-16 | Stop reason: SDUPTHER

## 2022-08-15 NOTE — TELEPHONE ENCOUNTER
Provider needs to review PDMP    PDMP Monitoring:    Last PDMP Yolis Duarte as Reviewed [facility]):  Review User Review Instant Review Result   SUKHWINDER SLADE 4/18/2022 12:34 PM Reviewed PDMP [1]     Urine Drug Screenings (1 yr)    No resulted procedures found.        Medication Contract and Consent for Opioid Use Documents Filed      No documents found

## 2022-08-19 DIAGNOSIS — R51.9 ACUTE NONINTRACTABLE HEADACHE, UNSPECIFIED HEADACHE TYPE: ICD-10-CM

## 2022-08-22 RX ORDER — BUTALBITAL, ACETAMINOPHEN AND CAFFEINE 50; 325; 40 MG/1; MG/1; MG/1
1 TABLET ORAL EVERY 6 HOURS PRN
Qty: 45 TABLET | Refills: 0 | Status: SHIPPED | OUTPATIENT
Start: 2022-08-22 | End: 2022-09-28 | Stop reason: SDUPTHER

## 2022-09-08 RX ORDER — ONDANSETRON HYDROCHLORIDE 8 MG/1
8 TABLET, FILM COATED ORAL EVERY 8 HOURS PRN
Qty: 20 TABLET | Refills: 1 | Status: SHIPPED | OUTPATIENT
Start: 2022-09-08

## 2022-09-16 DIAGNOSIS — F90.9 ATTENTION DEFICIT HYPERACTIVITY DISORDER (ADHD), UNSPECIFIED ADHD TYPE: ICD-10-CM

## 2022-09-16 DIAGNOSIS — F41.9 ANXIETY: ICD-10-CM

## 2022-09-16 RX ORDER — CLONAZEPAM 0.5 MG/1
0.5 TABLET ORAL 2 TIMES DAILY PRN
Qty: 45 TABLET | Refills: 0 | Status: SHIPPED | OUTPATIENT
Start: 2022-09-16 | End: 2022-10-17 | Stop reason: SDUPTHER

## 2022-09-16 RX ORDER — DEXTROAMPHETAMINE SACCHARATE, AMPHETAMINE ASPARTATE MONOHYDRATE, DEXTROAMPHETAMINE SULFATE AND AMPHETAMINE SULFATE 7.5; 7.5; 7.5; 7.5 MG/1; MG/1; MG/1; MG/1
30 CAPSULE, EXTENDED RELEASE ORAL DAILY
Qty: 30 CAPSULE | Refills: 0 | Status: SHIPPED | OUTPATIENT
Start: 2022-09-16 | End: 2022-10-17 | Stop reason: SDUPTHER

## 2022-09-16 NOTE — TELEPHONE ENCOUNTER
PDMP Monitoring:    Last PDMP Ban Chavez as Reviewed [facility]):  Review User Review Instant Review Result   SUKHWINDER SLADE 8/15/2022 12:17 PM Reviewed PDMP [1]     Urine Drug Screenings (1 yr)    No resulted procedures found.        Medication Contract and Consent for Opioid Use Documents Filed      No documents found

## 2022-09-22 ENCOUNTER — ANCILLARY PROCEDURE (OUTPATIENT)
Dept: PRIMARY CARE CLINIC | Age: 42
End: 2022-09-22
Payer: COMMERCIAL

## 2022-09-22 ENCOUNTER — OFFICE VISIT (OUTPATIENT)
Dept: PRIMARY CARE CLINIC | Age: 42
End: 2022-09-22
Payer: COMMERCIAL

## 2022-09-22 VITALS
BODY MASS INDEX: 19.9 KG/M2 | HEIGHT: 67 IN | HEART RATE: 98 BPM | TEMPERATURE: 97.6 F | WEIGHT: 126.8 LBS | SYSTOLIC BLOOD PRESSURE: 96 MMHG | DIASTOLIC BLOOD PRESSURE: 60 MMHG | RESPIRATION RATE: 16 BRPM | OXYGEN SATURATION: 99 %

## 2022-09-22 DIAGNOSIS — M54.2 NECK PAIN: ICD-10-CM

## 2022-09-22 DIAGNOSIS — F41.9 ANXIETY: ICD-10-CM

## 2022-09-22 DIAGNOSIS — F90.9 ATTENTION DEFICIT HYPERACTIVITY DISORDER (ADHD), UNSPECIFIED ADHD TYPE: ICD-10-CM

## 2022-09-22 DIAGNOSIS — G43.909 MIGRAINE WITHOUT STATUS MIGRAINOSUS, NOT INTRACTABLE, UNSPECIFIED MIGRAINE TYPE: Primary | ICD-10-CM

## 2022-09-22 DIAGNOSIS — R11.0 NAUSEA: ICD-10-CM

## 2022-09-22 PROCEDURE — 72040 X-RAY EXAM NECK SPINE 2-3 VW: CPT | Performed by: FAMILY MEDICINE

## 2022-09-22 PROCEDURE — 99214 OFFICE O/P EST MOD 30 MIN: CPT | Performed by: FAMILY MEDICINE

## 2022-09-22 SDOH — ECONOMIC STABILITY: FOOD INSECURITY: WITHIN THE PAST 12 MONTHS, YOU WORRIED THAT YOUR FOOD WOULD RUN OUT BEFORE YOU GOT MONEY TO BUY MORE.: NEVER TRUE

## 2022-09-22 SDOH — ECONOMIC STABILITY: FOOD INSECURITY: WITHIN THE PAST 12 MONTHS, THE FOOD YOU BOUGHT JUST DIDN'T LAST AND YOU DIDN'T HAVE MONEY TO GET MORE.: NEVER TRUE

## 2022-09-22 ASSESSMENT — PATIENT HEALTH QUESTIONNAIRE - PHQ9
SUM OF ALL RESPONSES TO PHQ QUESTIONS 1-9: 0
1. LITTLE INTEREST OR PLEASURE IN DOING THINGS: 0
SUM OF ALL RESPONSES TO PHQ QUESTIONS 1-9: 0
SUM OF ALL RESPONSES TO PHQ QUESTIONS 1-9: 0
2. FEELING DOWN, DEPRESSED OR HOPELESS: 0
SUM OF ALL RESPONSES TO PHQ QUESTIONS 1-9: 0
SUM OF ALL RESPONSES TO PHQ9 QUESTIONS 1 & 2: 0

## 2022-09-22 ASSESSMENT — SOCIAL DETERMINANTS OF HEALTH (SDOH): HOW HARD IS IT FOR YOU TO PAY FOR THE VERY BASICS LIKE FOOD, HOUSING, MEDICAL CARE, AND HEATING?: NOT VERY HARD

## 2022-09-23 ASSESSMENT — ENCOUNTER SYMPTOMS
NAUSEA: 0
VOMITING: 0
COUGH: 0
WHEEZING: 0
BACK PAIN: 0
DIARRHEA: 0
EYE DISCHARGE: 0
ABDOMINAL PAIN: 0
COLOR CHANGE: 0
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
## 2022-09-28 DIAGNOSIS — R51.9 ACUTE NONINTRACTABLE HEADACHE, UNSPECIFIED HEADACHE TYPE: ICD-10-CM

## 2022-09-28 RX ORDER — BUTALBITAL, ACETAMINOPHEN AND CAFFEINE 50; 325; 40 MG/1; MG/1; MG/1
1 TABLET ORAL EVERY 6 HOURS PRN
Qty: 45 TABLET | Refills: 0 | Status: SHIPPED | OUTPATIENT
Start: 2022-09-28 | End: 2022-10-17 | Stop reason: SDUPTHER

## 2022-09-28 NOTE — TELEPHONE ENCOUNTER
PDMP Monitoring:    Last PDMP Erick Estes as Reviewed [facility]):  Review User Review Instant Review Result   SUKHWINDER SLADE 8/15/2022 12:17 PM Reviewed PDMP [1]     Urine Drug Screenings (1 yr)    No resulted procedures found.        Medication Contract and Consent for Opioid Use Documents Filed      No documents found

## 2022-10-17 DIAGNOSIS — F90.9 ATTENTION DEFICIT HYPERACTIVITY DISORDER (ADHD), UNSPECIFIED ADHD TYPE: ICD-10-CM

## 2022-10-17 DIAGNOSIS — R51.9 ACUTE NONINTRACTABLE HEADACHE, UNSPECIFIED HEADACHE TYPE: ICD-10-CM

## 2022-10-17 DIAGNOSIS — F41.9 ANXIETY: ICD-10-CM

## 2022-10-17 RX ORDER — DEXTROAMPHETAMINE SACCHARATE, AMPHETAMINE ASPARTATE MONOHYDRATE, DEXTROAMPHETAMINE SULFATE AND AMPHETAMINE SULFATE 7.5; 7.5; 7.5; 7.5 MG/1; MG/1; MG/1; MG/1
30 CAPSULE, EXTENDED RELEASE ORAL DAILY
Qty: 30 CAPSULE | Refills: 0 | Status: SHIPPED | OUTPATIENT
Start: 2022-10-17 | End: 2022-11-16

## 2022-10-17 RX ORDER — CLONAZEPAM 0.5 MG/1
0.5 TABLET ORAL 2 TIMES DAILY PRN
Qty: 45 TABLET | Refills: 0 | Status: SHIPPED | OUTPATIENT
Start: 2022-10-17 | End: 2022-11-16

## 2022-10-17 RX ORDER — BUTALBITAL, ACETAMINOPHEN AND CAFFEINE 50; 325; 40 MG/1; MG/1; MG/1
1 TABLET ORAL EVERY 6 HOURS PRN
Qty: 45 TABLET | Refills: 0 | Status: SHIPPED | OUTPATIENT
Start: 2022-10-17

## 2022-10-17 NOTE — TELEPHONE ENCOUNTER
PDMP Monitoring:    Last PDMP Dami Mccormick as Reviewed [facility]):  Review User Review Instant Review Result   SUKHWINDER SLADE 8/15/2022 12:17 PM Reviewed PDMP [1]     Urine Drug Screenings (1 yr)    No resulted procedures found.        Medication Contract and Consent for Opioid Use Documents Filed      No documents found

## 2022-10-20 ENCOUNTER — HOSPITAL ENCOUNTER (OUTPATIENT)
Dept: PAIN MANAGEMENT | Age: 42
Discharge: HOME | End: 2022-10-20
Payer: COMMERCIAL

## 2022-10-20 VITALS
SYSTOLIC BLOOD PRESSURE: 118 MMHG | BODY MASS INDEX: 20.41 KG/M2 | WEIGHT: 127 LBS | RESPIRATION RATE: 16 BRPM | DIASTOLIC BLOOD PRESSURE: 70 MMHG | OXYGEN SATURATION: 100 % | HEIGHT: 66 IN | TEMPERATURE: 97.1 F | HEART RATE: 89 BPM

## 2022-10-20 DIAGNOSIS — M62.838 CERVICAL PARASPINAL MUSCLE SPASM: Primary | ICD-10-CM

## 2022-10-20 PROCEDURE — 99215 OFFICE O/P EST HI 40 MIN: CPT

## 2022-10-20 RX ORDER — METHOCARBAMOL 500 MG/1
500 TABLET, FILM COATED ORAL 3 TIMES DAILY
Qty: 90 TABLET | Refills: 2 | Status: SHIPPED | OUTPATIENT
Start: 2022-10-20 | End: 2023-01-18

## 2022-10-20 ASSESSMENT — PAIN SCALES - GENERAL: PAINLEVEL_OUTOF10: 9

## 2022-10-20 ASSESSMENT — PAIN DESCRIPTION - LOCATION: LOCATION: HEAD

## 2022-10-20 NOTE — PROGRESS NOTES
Office agreement contract reviewed with patient and signed per patient. Questions answered and patient states understanding of contract. Copy given to patient. Office phone number options also given to patient.

## 2022-10-20 NOTE — H&P
[facility] Physical & Pain Medicine    History and Physical    Patient Name: Lux Pablo    MR #: [id number]    Account [de-identified]    : 1980    Age: 38 y.o. Sex: female    Date: 10/20/2022    PCP: Marvin Cunningham MD    Referring Provider: PCP    Chief Complaint:   Chief Complaint   Patient presents with    Migraine     Headache days , migraine days        History of Present Illness: The patient is a 38 y.o. female who was referrred with primary complaints of daily headaches/migraines. Patient having migraines after the age of 14 after she suffered a head injury in an accident. Through the years, patient has been on numerous headache/migraine medication without success in alleviating her pain. Patient did try Botox at 1 appointment which she had significant relief in headaches however daughter was unable to get Botox authorized for continued treatment. Since headache migraines limits her ability to to complete tasks such as errands and household chores. Patient did have to stop working for a period of time due to the debilitating headache/Migraines. Patient does miss social, family or leisurely activities due to headache and migraine. In the last year patient has been in the emergency room 3-5 times due to headaches/migraines. Prior to having a migraine patient frequently has visual disturbances and or numbness on one side of her head. During a migraine patient will become nauseated will be sensitive to light and will often have pain on one side. Patient's medical records do not go past 2017. Patient states she has been on numerous medications throughout the years therefore list of medications below has been completed best to patient and providers ability. Headache   This is a chronic (since age of 14 after head injury) problem. The current episode started more than 1 year ago. The problem occurs constantly.  The problem has been gradually worsening. The pain is located in the Bilateral and frontal region. The quality of the pain is described as stabbing, sharp and throbbing (pressure). Associated symptoms include neck pain, numbness, photophobia and a visual change. Pertinent negatives include no back pain or weakness. The symptoms are aggravated by noise, bright light, emotional stress and weather changes. Patient experiences > 15 headache days per month with at least 8 headaches last 4 or more hours a day or longer with one for more migraine symptoms over a 3 month period or longer. Patient is presently on or tired/failed the following medications after at least 60 days of use:  Beta blockers: believes she has been on in the past propanolol; Antihypertensive: Clonidine  Migraine abortive medications: Fioricet and Ubrelvy, Nurtec . Narcotics: None; Anticonvulsants: thinks she has been on in the past gabapentin (Neurontin) and topiramate (Topamax); Antidepressants: believes she has been on in the past amitriptyline (Elavil) Paroxetine (PAXIL), escitalopram (lexapro); CRGP Inhibitors: Galcanezumab-gmlm (Emgality) Muscle Relaxants: methocarbamol (Robaxin), NSAIDs: OTC & oxaprozin, and Topicals:  none      Patient will be an ideal candidate for Botox therapy.         Past Imaging  MRI brain (without contrast) 2021 - unremarkable    CT Head wo contrast 6/15/2022 - unremarkable    XR Cervical Spine 2022 - loss of cervical lordosis    Screening Tools:     PE.3    Past PEG: NA    ORT: 6    PHQ-9: 4    Current Pain Assessment  Pain Assessment  Pain Assessment: 0-10  Pain Level: 9  Pain Location: Head    Employment:     Past Medical History  Past Medical History:   Diagnosis Date    ADHD (attention deficit hyperactivity disorder)     Clotting disorder (HCC)     factor 14, 11    GERD (gastroesophageal reflux disease)     Headache        Allergies  Iv dye [iodides], Red dye, Shellfish-derived products, Oxaprozin, Shrimp flavor, and Tyloxapol    Current Medications  Current Outpatient Medications   Medication Sig Dispense Refill    methocarbamol (ROBAXIN) 500 MG tablet Take 1 tablet by mouth 3 times daily 90 tablet 2    amoxicillin (AMOXIL) 500 MG capsule Take 1 capsule by mouth 2 times daily for 7 days 14 capsule 0    topiramate (TOPAMAX) 200 MG tablet Take 1 tablet by mouth 2 times daily 180 tablet 3    amphetamine-dextroamphetamine (ADDERALL XR) 30 MG extended release capsule Take 1 capsule by mouth daily for 30 days. 30 capsule 0    clonazePAM (KLONOPIN) 0.5 MG tablet Take 1 tablet by mouth 2 times daily as needed for Anxiety for up to 30 days. 45 tablet 0    butalbital-acetaminophen-caffeine (FIORICET, ESGIC) -40 MG per tablet Take 1 tablet by mouth every 6 hours as needed for Headaches 45 tablet 0    ondansetron (ZOFRAN) 8 MG tablet Take 1 tablet by mouth every 8 hours as needed for Nausea or Vomiting 20 tablet 1    norethindrone-ethinyl estradiol-Fe (LO LOESTRIN FE) 1 MG-10 MCG / 10 MCG tablet Take 1 tablet by mouth in the morning. 28 tablet 3     No current facility-administered medications for this encounter.        Social History    Social History     Socioeconomic History    Marital status: Single     Spouse name: None    Number of children: 1    Years of education: None    Highest education level: None   Tobacco Use    Smoking status: Former     Types: Cigarettes    Smokeless tobacco: Never   Vaping Use    Vaping Use: Never used   Substance and Sexual Activity    Alcohol use: Not Currently     Alcohol/week: 0.0 - 3.0 standard drinks    Drug use: Never    Sexual activity: Yes     Social Determinants of Health     Financial Resource Strain: Low Risk     Difficulty of Paying Living Expenses: Not very hard   Food Insecurity: No Food Insecurity    Worried About Running Out of Food in the Last Year: Never true    Ran Out of Food in the Last Year: Never true         Family History  family history includes Esophageal Cancer in her paternal uncle. Review of Systems:  Review of Systems   Constitutional:  Positive for activity change. Eyes:  Positive for photophobia. Gastrointestinal:  Negative for constipation. Musculoskeletal:  Positive for arthralgias, myalgias, neck pain and neck stiffness. Negative for back pain. Neurological:  Positive for numbness and headaches. Negative for weakness. Psychiatric/Behavioral:  Positive for decreased concentration and sleep disturbance. Negative for agitation, self-injury and suicidal ideas. The patient is not nervous/anxious. 14 point ROS negative besides that noted in HPI    Physical exam:     Vitals:    10/20/22 1029   BP: 118/70   Pulse: 89   Resp: 16   Temp: 97.1 °F (36.2 °C)   TempSrc: Temporal   SpO2: 100%   Weight: 127 lb (57.6 kg)   Height: 5' 6\" (1.676 m)       Body mass index is 20.5 kg/m². Physical Exam  Vitals and nursing note reviewed. Constitutional:       General: She is not in acute distress. Appearance: Normal appearance. She is well-developed. HENT:      Head: Normocephalic. Right Ear: External ear normal.      Left Ear: External ear normal.      Nose: Nose normal.   Eyes:      Conjunctiva/sclera: Conjunctivae normal.      Pupils: Pupils are equal, round, and reactive to light. Neck:      Vascular: No JVD. Trachea: No tracheal deviation. Cardiovascular:      Rate and Rhythm: Normal rate. Pulmonary:      Effort: Pulmonary effort is normal.   Abdominal:      General: There is no distension. Tenderness: There is no abdominal tenderness. Musculoskeletal:      Cervical back: Spasms (TTP - tender palaple knots identiifed ie trigger points) and tenderness present. Pain with movement present. Decreased range of motion. Skin:     General: Skin is warm and dry. Neurological:      Mental Status: She is alert and oriented to person, place, and time. Cranial Nerves: No cranial nerve deficit.    Psychiatric:         Mood and Affect: Mood normal.         Behavior: Behavior normal.         Thought Content: Thought content normal.         Judgment: Judgment normal.       Labs:     Lab Results   Component Value Date/Time     09/10/2020 02:42 PM    K 3.6 09/10/2020 02:42 PM    K 4.0 07/20/2020 09:37 AM     09/10/2020 02:42 PM    CO2 19 09/10/2020 02:42 PM    BUN 11 09/10/2020 02:42 PM    CREATININE 0.6 09/10/2020 02:42 PM    GLUCOSE 91 09/10/2020 02:42 PM    CALCIUM 8.9 09/10/2020 02:42 PM        Lab Results   Component Value Date    WBC 4.2 (L) 06/15/2022    HGB 13.0 06/15/2022    HCT 40.4 06/15/2022    MCV 98.5 06/15/2022     (L) 06/15/2022       Assessment:                                                                                                                                        Active Problems:    Cervical paraspinal muscle spasm    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 Patient is an ideal candidate. She has had injections in the past that were very effective. Patient has failed numerous other medications. Patient does have 3-5 annual visits to ED due to migraines. Patient has had to stop working in the past due to migraines. Discussed risk versus benefits of Botox injections. Discussed side effects that could be experienced as follows generalized muscle weakness, diplopia, proptosis, dysphagia, dysphonia, dysarthria, urinary incontinence, and breathing difficulties. Discussed Botox is an ongoing process that will evolve over time.   Explained that patient response to Botox will be assessed and adjustments in dosages will be made appropriately. Patient understands that it is important to attend all appointments and adhering to a the regimented schedule. Discussion: Discussed exam findings, reviewed imaging yes - , gave education regarding pathophysiology, exercise, and treatment options yes - , and reviewed plan of care with patient. Patient agreed with POC and questions were asked and answered. See DC instructions. Activity: discussed exercise as beneficial to pain reduction, encouraged stretching exercise. Goal:  Pain Management Goals of Therapy:   [] Resolution in pain  [x] Decrease in pain level  [x] Improvement in ADL's  [x] Increase in activities with less pain  [] Decrease in medication     Controlled substance monitoring:    [] Discussed medication side effects, risk of tolerance and/or dependence, and/or alternative treatment  [] Discussed the detrimental effects of long term narcotic use in younger patients especially women of childbearing years.   [] No signs and symptoms of potential drug abuse or diversion were identified  [] Signs of potential drug abuse or diversion were identified   [] ORT Score   [] UDS non-compliant   [] See Note  [] Random urine drug screen sent today  [] Random urine drug screen not completed today   [] Deferred New Patient  [] Compliant   [] Not Compliant see note  [] Medication agreement with provider signed today  [] Medication agreement with provider on file under media   [] Medication regimen effective with no c/o side effects and continued   [] New patient continuing current medication while developing POC   [] On going assessment and evaluation of medication regimen  [] Medication regimen not effective see plan for changes  [] Sasha [place] reviewed & on file under media     CC:  MD Se Saeed, ALVERTO - CNP, 11/27/2022 at 2:25 PM    EMR dragon/transcription disclaimer: Much of this encounter note is electronic transcription/translation of spoken language to printed tach. Electronic translation of spoken language may be erroneous, or at times, nonsensical words or phrases may be inadvertently transcribed.  Although, I have reviewed the note for such errors, some may still exist.

## 2022-10-25 ENCOUNTER — PATIENT MESSAGE (OUTPATIENT)
Dept: PRIMARY CARE CLINIC | Age: 42
End: 2022-10-25

## 2022-10-25 RX ORDER — METHYLPREDNISOLONE 4 MG/1
TABLET ORAL
Qty: 1 KIT | Refills: 0 | Status: SHIPPED | OUTPATIENT
Start: 2022-10-25 | End: 2022-10-31

## 2022-10-25 NOTE — TELEPHONE ENCOUNTER
From: Jessica Ferrari  To: Dr. Ángel Bell: 10/25/2022 11:31 AM CDT  Subject: Sinus infection    Pretty sure I have a sinus infection all stopped up and cant breathe.  Can I get a Johnson dose pack or something to help me with this

## 2022-11-06 DIAGNOSIS — R51.9 ACUTE NONINTRACTABLE HEADACHE, UNSPECIFIED HEADACHE TYPE: ICD-10-CM

## 2022-11-07 ENCOUNTER — OFFICE VISIT (OUTPATIENT)
Age: 42
End: 2022-11-07
Payer: COMMERCIAL

## 2022-11-07 VITALS
RESPIRATION RATE: 18 BRPM | SYSTOLIC BLOOD PRESSURE: 112 MMHG | BODY MASS INDEX: 20.82 KG/M2 | HEART RATE: 76 BPM | DIASTOLIC BLOOD PRESSURE: 74 MMHG | TEMPERATURE: 97.9 F | WEIGHT: 129 LBS | OXYGEN SATURATION: 100 %

## 2022-11-07 DIAGNOSIS — R52 BODY ACHES: Primary | ICD-10-CM

## 2022-11-07 DIAGNOSIS — Z11.52 ENCOUNTER FOR SCREENING FOR COVID-19: ICD-10-CM

## 2022-11-07 LAB
INFLUENZA A ANTIBODY: NEGATIVE
INFLUENZA B ANTIBODY: NEGATIVE
SARS-COV-2, PCR: NOT DETECTED

## 2022-11-07 PROCEDURE — 99213 OFFICE O/P EST LOW 20 MIN: CPT | Performed by: NURSE PRACTITIONER

## 2022-11-07 PROCEDURE — 87804 INFLUENZA ASSAY W/OPTIC: CPT | Performed by: NURSE PRACTITIONER

## 2022-11-07 RX ORDER — BUTALBITAL, ACETAMINOPHEN AND CAFFEINE 50; 325; 40 MG/1; MG/1; MG/1
1 TABLET ORAL EVERY 6 HOURS PRN
Qty: 45 TABLET | Refills: 0 | Status: SHIPPED | OUTPATIENT
Start: 2022-11-07

## 2022-11-07 ASSESSMENT — ENCOUNTER SYMPTOMS
ABDOMINAL PAIN: 0
RESPIRATORY NEGATIVE: 1
RHINORRHEA: 0
VOMITING: 0
WHEEZING: 0
SORE THROAT: 0
NAUSEA: 0
COUGH: 0
TROUBLE SWALLOWING: 0
VOICE CHANGE: 0
EYES NEGATIVE: 1

## 2022-11-07 NOTE — TELEPHONE ENCOUNTER
PDMP Monitoring:    Last PDMP [facility] as Reviewed [facility]):  Review User Review Instant Review Result   SUKHWINDER SLADE 8/15/2022 12:17 PM Reviewed PDMP [1]     Urine Drug Screenings (1 yr)    No resulted procedures found.        Medication Contract and Consent for Opioid Use Documents Filed      No documents found

## 2022-11-07 NOTE — PROGRESS NOTES
[address]  [address] [address] [place] [address]  Dept: [phone]  Dept Fax: [phone]  Loc: [phone]     Jace Anderson is a 46 y.o. female who presents today for her medical conditions/complaintsas noted below. Jace Anderson is c/o of Generalized Body Aches, Head Congestion, Chills, Fever, and Ear Fullness        HPI:     URI  This is a new problem. The current episode started in the past 7 days. The problem occurs constantly. The problem has been unchanged. Associated symptoms include congestion, coughing and a sore throat. Pertinent negatives include  abdominal pain, anorexia, arthralgias, change in bowel habit, chest pain, chills, diaphoresis, fatigue, fever, headaches, joint swelling, myalgias, nausea, neck pain, numbness, rash, swollen glands, urinary symptoms, vertigo, visual change, vomiting or weakness. Pt tried OTC meds with mild symptom relief. Denies any other symptoms.      Results for orders placed or performed in visit on 11/07/22   POCT Influenza A/B   Result Value Ref Range    Influenza A Ab negative     Influenza B Ab negative               Past Medical History:   Diagnosis Date    ADHD (attention deficit hyperactivity disorder)     Clotting disorder (HCC)     factor 14, 11    GERD (gastroesophageal reflux disease)     Headache       Past Surgical History:   Procedure Laterality Date    BREAST ENHANCEMENT SURGERY      CHOLECYSTECTOMY      COLONOSCOPY  2005    \"NORMAL\" PER PT RECALL    COLONOSCOPY  2019    [address]- \" normal\" per pt recall    MT COLONOSCOPY FLX DX W/COLLJ SPEC WHEN PFRMD N/A 7/23/2018    Dr Villa Neighbor, 15 yr (age 50) recall    MT EGD TRANSORAL BIOPSY SINGLE/MULTIPLE N/A 7/23/2018    Dr Juan Frankel (-) Cathy, (+) Barrettt's, (-) dysplasia--1st dx--1 yr recall    TONSILLECTOMY AND ADENOIDECTOMY      UPPER GASTROINTESTINAL ENDOSCOPY  2005    \"NORMAL\" PER PT RECALL    UPPER GASTROINTESTINAL ENDOSCOPY  2019 [place], [place]- \"normal\" per pt recall    UPPER GASTROINTESTINAL ENDOSCOPY N/A 9/30/2020    Dr Darnell Varela exam,  NEG EoE, Neg Barretts, no recall       Family History   Problem Relation Age of Onset    Esophageal Cancer Paternal Uncle     Colon Cancer Neg Hx     Colon Polyps Neg Hx     Liver Cancer Neg Hx     Liver Disease Neg Hx     Stomach Cancer Neg Hx     Rectal Cancer Neg Hx        Social History     Tobacco Use    Smoking status: Former     Types: Cigarettes    Smokeless tobacco: Never   Substance Use Topics    Alcohol use: Not Currently     Alcohol/week: 0.0 - 3.0 standard drinks      Current Outpatient Medications   Medication Sig Dispense Refill    butalbital-acetaminophen-caffeine (FIORICET, ESGIC) -40 MG per tablet Take 1 tablet by mouth every 6 hours as needed for Headaches 45 tablet 0    methocarbamol (ROBAXIN) 500 MG tablet Take 1 tablet by mouth 3 times daily 90 tablet 2    amphetamine-dextroamphetamine (ADDERALL XR) 30 MG extended release capsule Take 1 capsule by mouth daily for 30 days. 30 capsule 0    clonazePAM (KLONOPIN) 0.5 MG tablet Take 1 tablet by mouth 2 times daily as needed for Anxiety for up to 30 days. 45 tablet 0    topiramate (TOPAMAX) 200 MG tablet Take 1 tablet by mouth 2 times daily 180 tablet 3    ondansetron (ZOFRAN) 8 MG tablet Take 1 tablet by mouth every 8 hours as needed for Nausea or Vomiting 20 tablet 1    norethindrone-ethinyl estradiol-Fe (LO LOESTRIN FE) 1 MG-10 MCG / 10 MCG tablet Take 1 tablet by mouth in the morning. 28 tablet 3     No current facility-administered medications for this visit.      Allergies   Allergen Reactions    Iv Dye [Iodides] Itching     Itchy throat, redness    Red Dye Swelling     Throat swells    Shellfish-Derived Products Swelling     tongue and throat swells    Oxaprozin     Shrimp Flavor     Tyloxapol     Percocet [Oxycodone-Acetaminophen] Rash     10/20/22 patient states she has taken percocet without a rash now       Health Maintenance   Topic Date Due    COVID-19 Vaccine (1) Never done    Flu vaccine (1) 08/01/2022    Cervical cancer screen  11/20/2022    Depression Screen  09/22/2023    DTaP/Tdap/Td vaccine (2 - Td or Tdap) 07/11/2024    Colorectal Cancer Screen  07/23/2032    Hepatitis C screen  Completed    HIV screen  Completed    Hepatitis A vaccine  Aged Out    Hib vaccine  Aged Out    Meningococcal (ACWY) vaccine  Aged Out    Pneumococcal 0-64 years Vaccine  Aged Out    Varicella vaccine  Discontinued       Subjective:     Review of Systems   Constitutional:  Positive for fever. Negative for chills, diaphoresis and fatigue. HENT:  Positive for ear pain. Negative for congestion, mouth sores, nosebleeds, postnasal drip, rhinorrhea, sneezing, sore throat, tinnitus, trouble swallowing and voice change. Eyes: Negative. Respiratory: Negative. Negative for cough and wheezing. Cardiovascular:  Negative for chest pain. Gastrointestinal:  Negative for abdominal pain, nausea and vomiting. Endocrine: Negative. Genitourinary: Negative. Musculoskeletal:  Positive for myalgias. Negative for arthralgias, joint swelling and neck pain. Skin: Negative. Negative for rash. Neurological: Negative. Negative for weakness, numbness and headaches. Hematological: Negative. Psychiatric/Behavioral: Negative. Objective:     Physical Exam  Vitals and nursing note reviewed. Constitutional:       General: She is not in acute distress. Appearance: She is well-developed. She is not ill-appearing or toxic-appearing. HENT:      Head: Normocephalic and atraumatic. Right Ear: Hearing, tympanic membrane, ear canal and external ear normal.      Left Ear: Hearing, tympanic membrane, ear canal and external ear normal.      Nose: Nose normal.      Mouth/Throat:      Mouth: Mucous membranes are moist.      Pharynx: Oropharynx is clear. Uvula midline. No oropharyngeal exudate or posterior oropharyngeal erythema. Tonsils: No tonsillar abscesses. Eyes:      General:         Right eye: No discharge. Left eye: No discharge. Conjunctiva/sclera: Conjunctivae normal.      Pupils: Pupils are equal, round, and reactive to light. Neck:      Trachea: Trachea normal.   Cardiovascular:      Rate and Rhythm: Normal rate and regular rhythm. Pulmonary:      Effort: Pulmonary effort is normal.      Breath sounds: Normal breath sounds. No decreased breath sounds, wheezing, rhonchi or rales. Abdominal:      Palpations: Abdomen is soft. Tenderness: There is no abdominal tenderness. Musculoskeletal:      Cervical back: Normal range of motion. Lymphadenopathy:      Head:      Right side of head: No submental, submandibular, tonsillar, preauricular, posterior auricular or occipital adenopathy. Left side of head: No submental, submandibular, tonsillar, preauricular, posterior auricular or occipital adenopathy. Cervical: No cervical adenopathy. Skin:     General: Skin is warm and moist.      Capillary Refill: Capillary refill takes less than 2 seconds. Findings: No rash. Neurological:      Mental Status: She is alert and oriented to person, place, and time. Psychiatric:         Speech: Speech normal.         Behavior: Behavior normal.         Thought Content: Thought content normal.         Judgment: Judgment normal.     /74   Pulse 76   Temp 97.9 °F (36.6 °C) (Temporal)   Resp 18   Wt 129 lb (58.5 kg)   SpO2 100%   BMI 20.82 kg/m²     Assessment:          Diagnosis Orders   1. Body aches  POCT Influenza A/B          Plan:      Orders Placed This Encounter   Procedures    POCT Influenza A/B        No follow-ups on file. Orders Placed This Encounter   Procedures    POCT Influenza A/B     No orders of the defined types were placed in this encounter. Patient given educationalmaterials - see patient instructions. Discussed use, benefit, and side effectsof prescribed medications. All patient questions answered. Pt voiced understanding. Reviewed health maintenance. Instructed to continue current medications, diet andexercise. Patient agreed with treatment plan. Follow up as directed. There are no Patient Instructions on file for this visit.       Electronically signed by ALVERTO Mccollum CNP on 11/7/2022 at 3:59 PM

## 2022-11-15 DIAGNOSIS — F41.9 ANXIETY: ICD-10-CM

## 2022-11-15 RX ORDER — CLONAZEPAM 0.5 MG/1
0.5 TABLET ORAL 2 TIMES DAILY PRN
Qty: 45 TABLET | Refills: 0 | Status: SHIPPED | OUTPATIENT
Start: 2022-11-15 | End: 2022-12-15

## 2022-11-15 NOTE — TELEPHONE ENCOUNTER
Provider needs to review PDMP    PDMP Monitoring:    Last PDMP [facility] as Reviewed [facility]):  Review User Review Instant Review Result   SUKHWINDER SLADE 8/15/2022 12:17 PM Reviewed PDMP [1]     Urine Drug Screenings (1 yr)    No resulted procedures found.        Medication Contract and Consent for Opioid Use Documents Filed      No documents found

## 2022-11-16 ENCOUNTER — HOSPITAL ENCOUNTER (OUTPATIENT)
Dept: PAIN MANAGEMENT | Age: 42
Discharge: HOME | End: 2022-11-16
Payer: COMMERCIAL

## 2022-11-16 VITALS
RESPIRATION RATE: 18 BRPM | DIASTOLIC BLOOD PRESSURE: 76 MMHG | SYSTOLIC BLOOD PRESSURE: 110 MMHG | OXYGEN SATURATION: 100 % | HEART RATE: 83 BPM | TEMPERATURE: 96.9 F

## 2022-11-16 DIAGNOSIS — F90.9 ATTENTION DEFICIT HYPERACTIVITY DISORDER (ADHD), UNSPECIFIED ADHD TYPE: ICD-10-CM

## 2022-11-16 DIAGNOSIS — G43.719 CHRONIC MIGRAINE WITHOUT AURA, INTRACTABLE, WITHOUT STATUS MIGRAINOSUS: Primary | ICD-10-CM

## 2022-11-16 DIAGNOSIS — M62.838 CERVICAL PARASPINAL MUSCLE SPASM: ICD-10-CM

## 2022-11-16 PROCEDURE — 20553 NJX 1/MLT TRIGGER POINTS 3/>: CPT | Performed by: NURSE PRACTITIONER

## 2022-11-16 PROCEDURE — 20553 NJX 1/MLT TRIGGER POINTS 3/>: CPT

## 2022-11-16 PROCEDURE — 2500000003 HC RX 250 WO HCPCS

## 2022-11-16 RX ORDER — LIDOCAINE HYDROCHLORIDE 10 MG/ML
10 INJECTION, SOLUTION EPIDURAL; INFILTRATION; INTRACAUDAL; PERINEURAL ONCE
Status: DISCONTINUED | OUTPATIENT
Start: 2022-11-16 | End: 2022-11-18 | Stop reason: HOSPADM

## 2022-11-16 RX ORDER — BUPIVACAINE HYDROCHLORIDE 5 MG/ML
10 INJECTION, SOLUTION EPIDURAL; INTRACAUDAL ONCE
Status: DISCONTINUED | OUTPATIENT
Start: 2022-11-16 | End: 2022-11-18 | Stop reason: HOSPADM

## 2022-11-16 NOTE — PROCEDURES
[facility] Physical & Pain Medicine    Patient Name: Elis Mays    : 1980                    Age: 46 y.o. Sex: female    Date: 2022    Pre-op Diagnosis: Myofascial Pain/ Muscle Spasms/ Cervicalgia    Post-op Diagnosis: Myofascial Pain/ Muscle Spasms/ Cervicalgia    Procedure: Cervical Trigger Point Injections    Performing Procedure: FADIA Price - BC, VA-BC    Patient Vitals for the past 24 hrs:   BP Temp Temp src Pulse Resp SpO2   22 1330 110/76 96.9 °F (36.1 °C) Temporal 83 18 100 %       Description of Procedure:    After a brief physical assessment and failure to improve with conservative measures the patient presented for Cervical Trigger Point Injections The indications, limitations and possible complications were discussed with the patient and the patient elected to proceed with the procedure. After voluntary, informed and signed consent obtained the patient was placed in a seated position. Appropriate time out was obtained per policy. The area of maximal tenderness was palpated over the   bilaterally Cervical muscles - Splenius  Trapezius  Rhomboid The skin overlying these areas was marked with a skin marker. The skin overlying the proposed injection sites were then sprayed with Gebauer's Solution while protecting patient eyes. The areas were prepped using aseptic technique with CHG prep. Each trigger point of the Cervical muscles - Splenius  Trapezius  Rhomboid was injected with approximately 1-2 ml of a solution of 5 ml of 1% Lidocaine Plain and 5 ml of 0.5% Marcaine Plain after negative aspiration    Discharge: The patient tolerated the procedure well. There were no complications during the procedure and the patient was discharged home with discharge instructions. The patient has been instructed to contact the office should there be any complications or questions to arise between today and their next appointment.     Plan:    Patient to follow up in the office in about 6 weeks.      ALVERTO Sanderson - CNP, 11/16/2022 at 2:09 PM

## 2022-11-17 RX ORDER — DEXTROAMPHETAMINE SACCHARATE, AMPHETAMINE ASPARTATE MONOHYDRATE, DEXTROAMPHETAMINE SULFATE AND AMPHETAMINE SULFATE 7.5; 7.5; 7.5; 7.5 MG/1; MG/1; MG/1; MG/1
30 CAPSULE, EXTENDED RELEASE ORAL DAILY
Qty: 30 CAPSULE | Refills: 0 | Status: SHIPPED | OUTPATIENT
Start: 2022-11-17 | End: 2022-12-17

## 2022-11-17 NOTE — TELEPHONE ENCOUNTER
PDMP Monitoring:    Last PDMP [facility] as Reviewed [facility]):  Review User Review Instant Review Result   SUKHWINDER SLADE 11/15/2022  5:00 PM Reviewed PDMP [1]     Urine Drug Screenings (1 yr)    No resulted procedures found.        Medication Contract and Consent for Opioid Use Documents Filed        No documents found

## 2022-11-18 ENCOUNTER — PATIENT MESSAGE (OUTPATIENT)
Dept: PRIMARY CARE CLINIC | Age: 42
End: 2022-11-18

## 2022-11-18 RX ORDER — AMOXICILLIN 500 MG/1
500 CAPSULE ORAL 2 TIMES DAILY
Qty: 14 CAPSULE | Refills: 0 | Status: SHIPPED | OUTPATIENT
Start: 2022-11-18 | End: 2022-11-21 | Stop reason: SDUPTHER

## 2022-11-21 ENCOUNTER — PATIENT MESSAGE (OUTPATIENT)
Dept: PRIMARY CARE CLINIC | Age: 42
End: 2022-11-21

## 2022-11-21 RX ORDER — AMOXICILLIN 500 MG/1
500 CAPSULE ORAL 2 TIMES DAILY
Qty: 14 CAPSULE | Refills: 0 | Status: SHIPPED | OUTPATIENT
Start: 2022-11-21 | End: 2022-11-28

## 2022-11-21 NOTE — TELEPHONE ENCOUNTER
From: Aftab Reid  To: Dr. Taryn Young: 11/21/2022 8:38 AM CST  Subject: Left medication    We went out of town this weekend and I started the amoxicillin on Saturday and took one Sunday morning. I left it at the hotel by mistake Id there anyway you could send another script in and I can just pay for it.  Please and thank you

## 2022-11-23 ENCOUNTER — HOSPITAL ENCOUNTER (OUTPATIENT)
Dept: PAIN MANAGEMENT | Age: 42
Discharge: HOME | End: 2022-11-23
Payer: COMMERCIAL

## 2022-11-23 VITALS
RESPIRATION RATE: 18 BRPM | DIASTOLIC BLOOD PRESSURE: 75 MMHG | SYSTOLIC BLOOD PRESSURE: 106 MMHG | HEART RATE: 90 BPM | TEMPERATURE: 96.7 F | OXYGEN SATURATION: 100 %

## 2022-11-23 PROCEDURE — A4216 STERILE WATER/SALINE, 10 ML: HCPCS

## 2022-11-23 PROCEDURE — 6360000002 HC RX W HCPCS

## 2022-11-23 PROCEDURE — 2580000003 HC RX 258

## 2022-11-23 NOTE — PROGRESS NOTES
Patient arrived for appointment but is not approved for Botox. Will schedule patient to come back in two weeks.

## 2022-11-23 NOTE — DISCHARGE INSTRUCTIONS
[facility] Physical And Pain Medicine  Post Procedure Discharge Instructions        YOU HAVE HAD THE FOLLOWING PROCEDURE:                                  [] Occipital Nerve Blocks  [] CTS wrist injection(s)  [] Knee Injection(s)         [] Shoulder Injection(s)   [] Elbow Injection(s)     [] Botox Injection  [] Cervical Trigger Point Injections    [] Thoracic Trigger Point Injections    [] Lumbar Trigger Point Injections  [] Piriformis Trigger Point Injections  [] SI Joint Injection(s)     [] Trochanteric Bursa Injection(s)       [] Ankle Injection(s)   [] Plantar Fasciitis   []  ______________  Injection(s) [x] Botox [x]  Migraines [] Spasticity    YOU HAVE RECEIVED THE FOLLOWING MEDICATIONS IN YOUR INJECTION(s)  [] Lidocaine [] Bupivacaine   [] DepoMedrol (steroid) [] Decadron (steroid)  []  Kenalog (steroid)   [] Toradol  [] Supartz [] Wilene Pian    [x] Botox        PATIENT INFORMATION:   You may experience the following symptoms after your procedure. These symptoms are normal and should not cause concern: You may have an increase in your pain. This may last 24 - 48 hours after your procedure. You may have no change in the pain that you had prior to your injection(s). You may have weakness or numbness in your affected extremity. If this occurs, this may last until numbing the medication wears off. REPORT THE FOLLOWING SYMPTOMS TO YOUR DOCTOR:  Redness, swelling or drainage at the injection site(s)  Unusual pain that interferes with your normal activities of daily living. OTHER INSTRUCTIONS:    [] I will apply ice to the injection site(s) for at least 24 hours after the procedure. I will rotate the ice on for 20 minutes and off for 20 minutes for at least 24 hours. [] I will not apply heat for at least 48 hours and I will not take a hot bath or shower for at least 24 hours.      [] I understand that if Lidocaine or Bupivacaine was used in my injection(s) that the injection site(s) will be numb for a few hours after the procedure    [] I understand if a steroid was used it will take effect in 3 - 7 days. I understand that as the numbing medication wears off, the pain may return until the steroids start working. [] I understand that today I will rest for the next 24 hours and drink plenty of water. [x] For Botox for Migraines please do not wear anything constricting around the forehead for 7-10 days post injection. Examples headband, hats, or bandana    [] For Botox for Spasticity start therapy for the affected limb in two weeks. [] Additional instructions: ______________________________________________ ___________________________________________________________________    Sedation:  Was oral sedation given? [] Yes  [x] No    I understand that if I took an oral nerve calming medication I will not drive for  [] 24 hours after taking the medication.     [x] I have received a copy of my discharge instructions and understand the above instructions to the best of my knowledge    Patient Discharged:  [x] Home  [] Hospital  [] Other  ____________________________________________    Via:  [x] Ambulatory  [] Wheelchair   [] Stretcher [] EMS       Accompanied By:   [] Significant other  [] Family Member  [] Friend   [] Hospital Staff  []  Ambulance Staff  [] Other_______________________________________________    Plan:  [x] Will return to the office in   [] 1 month   [] 3 months for:  [] Procedure Follow-up  [x] Office Visit   [] Planned Procedure      Patient Signature: _____________________________________________________    Staff Signature: _______________________________________________________        If you have questions, problems or concerns you may call [phone] and follow the prompts    FADIA Hicks, VA-BC

## 2022-11-23 NOTE — PROGRESS NOTES
Patient states that he/she has __30____ headaches out of 30 days each month.   Patient states that he/she has ___1___ migraines out of 30 days each month.monthly

## 2022-11-27 PROBLEM — G43.109 MIGRAINE WITH AURA AND WITHOUT STATUS MIGRAINOSUS, NOT INTRACTABLE: Status: ACTIVE | Noted: 2022-04-11

## 2022-11-27 PROBLEM — M40.40: Status: ACTIVE | Noted: 2022-11-27

## 2022-11-27 PROBLEM — M62.838 MUSCLE SPASMS OF NECK: Status: ACTIVE | Noted: 2022-11-27

## 2022-11-27 ASSESSMENT — ENCOUNTER SYMPTOMS
CONSTIPATION: 0
VISUAL CHANGE: 1
BACK PAIN: 0
PHOTOPHOBIA: 1

## 2022-11-29 DIAGNOSIS — R53.83 FATIGUE, UNSPECIFIED TYPE: ICD-10-CM

## 2022-11-29 DIAGNOSIS — R51.9 ACUTE NONINTRACTABLE HEADACHE, UNSPECIFIED HEADACHE TYPE: ICD-10-CM

## 2022-11-29 LAB
ALBUMIN SERPL-MCNC: 4.2 G/DL (ref 3.5–5.2)
ALP BLD-CCNC: 44 U/L (ref 35–104)
ALT SERPL-CCNC: 8 U/L (ref 5–33)
ANION GAP SERPL CALCULATED.3IONS-SCNC: 9 MMOL/L (ref 7–19)
AST SERPL-CCNC: 9 U/L (ref 5–32)
BASOPHILS ABSOLUTE: 0 K/UL (ref 0–0.2)
BASOPHILS RELATIVE PERCENT: 0.7 % (ref 0–1)
BILIRUB SERPL-MCNC: 0.4 MG/DL (ref 0.2–1.2)
BUN BLDV-MCNC: 26 MG/DL (ref 6–20)
CALCIUM SERPL-MCNC: 9 MG/DL (ref 8.6–10)
CHLORIDE BLD-SCNC: 113 MMOL/L (ref 98–111)
CO2: 22 MMOL/L (ref 22–29)
CREAT SERPL-MCNC: 0.8 MG/DL (ref 0.5–0.9)
EOSINOPHILS ABSOLUTE: 0 K/UL (ref 0–0.6)
EOSINOPHILS RELATIVE PERCENT: 0.7 % (ref 0–5)
ESTRADIOL LEVEL: 11.2 PG/ML
FOLLICLE STIMULATING HORMONE: 14.9 MIU/ML
GFR SERPL CREATININE-BSD FRML MDRD: >60 ML/MIN/{1.73_M2}
GLUCOSE BLD-MCNC: 97 MG/DL (ref 74–109)
HCT VFR BLD CALC: 41.6 % (ref 37–47)
HEMOGLOBIN: 13.7 G/DL (ref 12–16)
IMMATURE GRANULOCYTES #: 0 K/UL
LUTEINIZING HORMONE: 8.5 MIU/ML
LYMPHOCYTES ABSOLUTE: 1.2 K/UL (ref 1.1–4.5)
LYMPHOCYTES RELATIVE PERCENT: 40.1 % (ref 20–40)
MCH RBC QN AUTO: 31.6 PG (ref 27–31)
MCHC RBC AUTO-ENTMCNC: 32.9 G/DL (ref 33–37)
MCV RBC AUTO: 96.1 FL (ref 81–99)
MONOCYTES ABSOLUTE: 0.2 K/UL (ref 0–0.9)
MONOCYTES RELATIVE PERCENT: 8 % (ref 0–10)
NEUTROPHILS ABSOLUTE: 1.5 K/UL (ref 1.5–7.5)
NEUTROPHILS RELATIVE PERCENT: 50.5 % (ref 50–65)
PDW BLD-RTO: 12.1 % (ref 11.5–14.5)
PLATELET # BLD: 128 K/UL (ref 130–400)
PMV BLD AUTO: 10.9 FL (ref 9.4–12.3)
POTASSIUM SERPL-SCNC: 4 MMOL/L (ref 3.5–5)
RBC # BLD: 4.33 M/UL (ref 4.2–5.4)
SODIUM BLD-SCNC: 144 MMOL/L (ref 136–145)
TOTAL PROTEIN: 6.5 G/DL (ref 6.6–8.7)
TSH REFLEX FT4: 3.91 UIU/ML (ref 0.35–5.5)
WBC # BLD: 3 K/UL (ref 4.8–10.8)

## 2022-12-05 DIAGNOSIS — R51.9 ACUTE NONINTRACTABLE HEADACHE, UNSPECIFIED HEADACHE TYPE: ICD-10-CM

## 2022-12-05 RX ORDER — BUTALBITAL, ACETAMINOPHEN AND CAFFEINE 50; 325; 40 MG/1; MG/1; MG/1
1 TABLET ORAL EVERY 6 HOURS PRN
Qty: 45 TABLET | Refills: 0 | Status: SHIPPED | OUTPATIENT
Start: 2022-12-05

## 2022-12-05 RX ORDER — ONDANSETRON HYDROCHLORIDE 8 MG/1
8 TABLET, FILM COATED ORAL EVERY 8 HOURS PRN
Qty: 20 TABLET | Refills: 1 | Status: SHIPPED | OUTPATIENT
Start: 2022-12-05

## 2022-12-05 RX ORDER — NORETHINDRONE ACETATE AND ETHINYL ESTRADIOL, ETHINYL ESTRADIOL AND FERROUS FUMARATE 1MG-10(24)
1 KIT ORAL DAILY
Qty: 28 TABLET | Refills: 3 | Status: SHIPPED | OUTPATIENT
Start: 2022-12-05 | End: 2023-01-04

## 2022-12-05 NOTE — TELEPHONE ENCOUNTER
Provider needs to review PDMP    PDMP Monitoring:    Last PDMP Jaky Monson as Reviewed [facility]):  Review User Review Instant Review Result   SUKHWINDER SLADE 11/15/2022  5:00 PM Reviewed PDMP [1]     Urine Drug Screenings (1 yr)    No resulted procedures found.        Medication Contract and Consent for Opioid Use Documents Filed      No documents found

## 2022-12-07 ENCOUNTER — HOSPITAL ENCOUNTER (OUTPATIENT)
Dept: PAIN MANAGEMENT | Age: 42
Discharge: HOME | End: 2022-12-07
Payer: COMMERCIAL

## 2022-12-07 VITALS
SYSTOLIC BLOOD PRESSURE: 107 MMHG | TEMPERATURE: 96.8 F | OXYGEN SATURATION: 100 % | HEART RATE: 81 BPM | RESPIRATION RATE: 18 BRPM | DIASTOLIC BLOOD PRESSURE: 82 MMHG

## 2022-12-07 DIAGNOSIS — G43.109 MIGRAINE WITH AURA AND WITHOUT STATUS MIGRAINOSUS, NOT INTRACTABLE: Primary | ICD-10-CM

## 2022-12-07 PROCEDURE — 6360000002 HC RX W HCPCS

## 2022-12-07 PROCEDURE — 64615 CHEMODENERV MUSC MIGRAINE: CPT

## 2022-12-07 PROCEDURE — A4216 STERILE WATER/SALINE, 10 ML: HCPCS

## 2022-12-07 PROCEDURE — 2580000003 HC RX 258

## 2022-12-07 RX ORDER — SODIUM CHLORIDE 9 MG/ML
4.2 INJECTION INTRAVENOUS ONCE
Status: DISCONTINUED | OUTPATIENT
Start: 2022-12-07 | End: 2022-12-09 | Stop reason: HOSPADM

## 2022-12-07 NOTE — PROCEDURES
[address]    Patient Name: Vilma Badillo    : 1980                    Age: 38 y.o. Sex: female    Date: 2022    Pre-op Diagnosis: Chronic Migraines    Post-op Diagnosis: Chronic Migraines    Procedure: Botox injections x 155 units (45 units wasted)    Performing Procedure: ALVERTO Kelly    Patient Vitals for the past 24 hrs:   BP Temp Temp src Pulse Resp SpO2   22 0917 107/82 96.8 °F (36 °C) Temporal 81 18 100 %       Previous Injections:  Patient had 15/30 headaches/migraines over the past month. Indications:    Vilma Badillo has been treated for problems with chronic migraine headaches. The patient was taken through a conservative course of treatment without complete resolution of symptoms. Botox injection therapy was offered and after the risks and benefits of the procedure were explained to the patient, we had agreed to proceed. The patient was taken to the procedure room and placed in the seated position. The following muscles were identified using palpation and standard landmarks. These muscles were marked and prepped with alcohol. A total of 155 units were injected after careful aspiration and the following distribution bilaterally:  10 units in 2 sites, procerus 5 units in one site, frontalis 20 units in 4 sites, temporalis 40 unit in 8 sites, occipitals 30 units in 6 sites, cervical paraspinals 20 units in 4 sites, and trapezius 30 units in 6 sites. Discharge: The patient tolerated the procedure well. There were no complications during the procedure and the patient was discharged home with discharge instructions. The patient has been instructed to contact the office should there be any complications or questions to arise between today and their next appointment. Plan:    Patient to follow up in the office in about 6 weeks.      ALVERTO Mehta - CNP, 2022 at 12:23 PM

## 2022-12-07 NOTE — DISCHARGE INSTRUCTIONS
[facility] Physical And Pain Medicine  Post Procedure Discharge Instructions        YOU HAVE HAD THE FOLLOWING PROCEDURE:                                  [] Occipital Nerve Blocks  [] CTS wrist injection(s)  [] Knee Injection(s)         [] Shoulder Injection(s)   [] Elbow Injection(s)     [x] Botox Injection  [] Cervical Trigger Point Injections    [] Thoracic Trigger Point Injections    [] Lumbar Trigger Point Injections  [] Piriformis Trigger Point Injections  [] SI Joint Injection(s)     [] Trochanteric Bursa Injection(s)       [] Ankle Injection(s)   [] Plantar Fasciitis   []  ______________  Injection(s) [x] Botox [x]  Migraines [] Spasticity    YOU HAVE RECEIVED THE FOLLOWING MEDICATIONS IN YOUR INJECTION(s)  [] Lidocaine [] Bupivacaine   [] DepoMedrol (steroid) [] Decadron (steroid)  []  Kenalog (steroid)   [] Toradol  [] Supartz [] Michelle Ganong    [x] Botox        PATIENT INFORMATION:   You may experience the following symptoms after your procedure. These symptoms are normal and should not cause concern: You may have an increase in your pain. This may last 24 - 48 hours after your procedure. You may have no change in the pain that you had prior to your injection(s). You may have weakness or numbness in your affected extremity. If this occurs, this may last until numbing the medication wears off. REPORT THE FOLLOWING SYMPTOMS TO YOUR DOCTOR:  Redness, swelling or drainage at the injection site(s)  Unusual pain that interferes with your normal activities of daily living. OTHER INSTRUCTIONS:    [] I will apply ice to the injection site(s) for at least 24 hours after the procedure. I will rotate the ice on for 20 minutes and off for 20 minutes for at least 24 hours. [] I will not apply heat for at least 48 hours and I will not take a hot bath or shower for at least 24 hours.      [] I understand that if Lidocaine or Bupivacaine was used in my injection(s) that the injection site(s) will be numb for a few hours after the procedure    [] I understand if a steroid was used it will take effect in 3 - 7 days. I understand that as the numbing medication wears off, the pain may return until the steroids start working. [] I understand that today I will rest for the next 24 hours and drink plenty of water. [x] For Botox for Migraines please do not wear anything constricting around the forehead for 7-10 days post injection. Examples headband, hats, or bandana    [] For Botox for Spasticity start therapy for the affected limb in two weeks. [] Additional instructions: ______________________________________________ ___________________________________________________________________    Sedation:  Was oral sedation given? [] Yes  [x] No    I understand that if I took an oral nerve calming medication I will not drive for  [] 24 hours after taking the medication.     [x] I have received a copy of my discharge instructions and understand the above instructions to the best of my knowledge    Patient Discharged:  [x] Home  [] Hospital  [] Other  ____________________________________________    Via:  [x] Ambulatory  [] Wheelchair   [] Stretcher [] EMS       Accompanied By:   [] Significant other  [] Family Member  [] Friend   [] Hospital Staff  []  Ambulance Staff  [x] Other_______________________________________________    Plan:  [] Will return to the office in   [] 1 month   [] 3 months for:  [] Procedure Follow-up  [x] Office Visit   [x] Planned Procedure      Patient Signature: _____________________________________________________    Staff Signature: _______________________________________________________        If you have questions, problems or concerns you may call [phone] and follow the prompts    FADIA Rivera, VA-BC

## 2022-12-12 ENCOUNTER — PATIENT MESSAGE (OUTPATIENT)
Dept: PRIMARY CARE CLINIC | Age: 42
End: 2022-12-12

## 2022-12-12 RX ORDER — NORETHINDRONE ACETATE AND ETHINYL ESTRADIOL 1.5-30(21)
1 KIT ORAL DAILY
Qty: 1 PACKET | Refills: 3 | Status: SHIPPED | OUTPATIENT
Start: 2022-12-12

## 2022-12-12 NOTE — TELEPHONE ENCOUNTER
From: Júnior Drake  To: Dr. Mayra Lennox: 12/12/2022 11:26 AM CST  Subject: Estradiol     Were we going to increase the birth control so the estradiol will be a little higher? The nurse called last week and told me we were.

## 2022-12-13 ENCOUNTER — TELEPHONE (OUTPATIENT)
Dept: PAIN MANAGEMENT | Age: 42
End: 2022-12-13

## 2022-12-13 DIAGNOSIS — G43.109 MIGRAINE WITH AURA AND WITHOUT STATUS MIGRAINOSUS, NOT INTRACTABLE: Primary | ICD-10-CM

## 2022-12-14 DIAGNOSIS — F90.9 ATTENTION DEFICIT HYPERACTIVITY DISORDER (ADHD), UNSPECIFIED ADHD TYPE: ICD-10-CM

## 2022-12-14 DIAGNOSIS — F41.9 ANXIETY: ICD-10-CM

## 2022-12-15 RX ORDER — DEXTROAMPHETAMINE SACCHARATE, AMPHETAMINE ASPARTATE MONOHYDRATE, DEXTROAMPHETAMINE SULFATE AND AMPHETAMINE SULFATE 7.5; 7.5; 7.5; 7.5 MG/1; MG/1; MG/1; MG/1
30 CAPSULE, EXTENDED RELEASE ORAL DAILY
Qty: 30 CAPSULE | Refills: 0 | Status: SHIPPED | OUTPATIENT
Start: 2022-12-15 | End: 2023-01-14

## 2022-12-15 RX ORDER — CLONAZEPAM 0.5 MG/1
0.5 TABLET ORAL 2 TIMES DAILY PRN
Qty: 45 TABLET | Refills: 0 | Status: SHIPPED | OUTPATIENT
Start: 2022-12-15 | End: 2023-01-14

## 2022-12-15 NOTE — TELEPHONE ENCOUNTER
PDMP Monitoring:    Last PDMP Bishop Moore as Reviewed [facility]):  Review User Review Instant Review Result   SUKHWINDER SLADE 11/15/2022  5:00 PM Reviewed PDMP [1]     Urine Drug Screenings (1 yr)    No resulted procedures found.        Medication Contract and Consent for Opioid Use Documents Filed      No documents found

## 2022-12-20 ENCOUNTER — PATIENT MESSAGE (OUTPATIENT)
Dept: PRIMARY CARE CLINIC | Age: 42
End: 2022-12-20

## 2022-12-20 DIAGNOSIS — D72.819 LEUKOPENIA, UNSPECIFIED TYPE: Primary | ICD-10-CM

## 2022-12-20 NOTE — TELEPHONE ENCOUNTER
From: Samaria Esteban  To: Dr. Chavez Cue: 12/20/2022 12:23 PM CST  Subject: Getting in with hematologist     Olive Maher know if we were still making an appointment with hematologist to check on my WBC, platelets. I havent heard from anyone.  Thank you and [facility]

## 2022-12-26 ENCOUNTER — PATIENT MESSAGE (OUTPATIENT)
Dept: PRIMARY CARE CLINIC | Age: 42
End: 2022-12-26

## 2022-12-27 RX ORDER — CLINDAMYCIN HYDROCHLORIDE 300 MG/1
300 CAPSULE ORAL 2 TIMES DAILY
Qty: 14 CAPSULE | Refills: 0 | Status: SHIPPED | OUTPATIENT
Start: 2022-12-27 | End: 2023-01-03

## 2022-12-27 NOTE — TELEPHONE ENCOUNTER
From: Saran Nelson  To: Dr. Potts Mehul: 12/26/2022 10:18 AM CST  Subject: My bellybutton     I have my bellybutton swollen and infected again.  Didnt know if you could send in an antibiotic and the cream again its sore

## 2023-01-05 DIAGNOSIS — R51.9 ACUTE NONINTRACTABLE HEADACHE, UNSPECIFIED HEADACHE TYPE: ICD-10-CM

## 2023-01-05 RX ORDER — BUTALBITAL, ACETAMINOPHEN AND CAFFEINE 50; 325; 40 MG/1; MG/1; MG/1
1 TABLET ORAL EVERY 6 HOURS PRN
Qty: 45 TABLET | Refills: 0 | Status: SHIPPED | OUTPATIENT
Start: 2023-01-05

## 2023-01-09 ENCOUNTER — HOSPITAL ENCOUNTER (OUTPATIENT)
Dept: PAIN MANAGEMENT | Age: 43
Discharge: HOME | End: 2023-01-09
Payer: COMMERCIAL

## 2023-01-09 VITALS
HEART RATE: 81 BPM | WEIGHT: 122 LBS | TEMPERATURE: 97.4 F | SYSTOLIC BLOOD PRESSURE: 105 MMHG | DIASTOLIC BLOOD PRESSURE: 71 MMHG | RESPIRATION RATE: 16 BRPM | BODY MASS INDEX: 19.61 KG/M2 | HEIGHT: 66 IN | OXYGEN SATURATION: 100 %

## 2023-01-09 DIAGNOSIS — M62.838 CERVICAL PARASPINAL MUSCLE SPASM: ICD-10-CM

## 2023-01-09 PROCEDURE — 99213 OFFICE O/P EST LOW 20 MIN: CPT

## 2023-01-09 RX ORDER — PSEUDOEPHEDRINE HCL 30 MG
30 TABLET ORAL EVERY 4 HOURS PRN
COMMUNITY

## 2023-01-09 RX ORDER — METHOCARBAMOL 500 MG/1
500 TABLET, FILM COATED ORAL 3 TIMES DAILY
Qty: 90 TABLET | Refills: 2 | Status: SHIPPED | OUTPATIENT
Start: 2023-01-09 | End: 2023-04-09

## 2023-01-09 ASSESSMENT — PAIN SCALES - GENERAL: PAINLEVEL_OUTOF10: 9

## 2023-01-09 ASSESSMENT — PAIN DESCRIPTION - LOCATION: LOCATION: HEAD

## 2023-01-09 ASSESSMENT — ENCOUNTER SYMPTOMS
PHOTOPHOBIA: 1
BACK PAIN: 0
CONSTIPATION: 0
VISUAL CHANGE: 1

## 2023-01-09 NOTE — TELEPHONE ENCOUNTER
1. Cervical paraspinal muscle spasm  - methocarbamol (ROBAXIN) 500 MG tablet; Take 1 tablet by mouth 3 times daily  Dispense: 90 tablet;  Refill: 2     Refill at office visit
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
## 2023-01-09 NOTE — PROGRESS NOTES
Clinic Documentation      Education Provided:  [x] Review of [facility]  [] Agreement Review  [x] PEG Score Calculated [] PHQ Score Calculated [] ORT Score Calculated    [] Compliance Issues Discussed [] Cognitive Behavior Needs [x] Exercise [] Review of Test [] Financial Issues  [x] Tobacco/Alcohol Use Reviewed [x] Teaching [] New Patient [] Picture Obtained    Physician Plan:  [] Outgoing Referral  [] Pharmacy Consult  [] Test Ordered [] Prescription Ordered/Changed   [] Obtained Test Results / Consult Notes        Complete if patient is withholding blood thinner for procedure     Blood Thinner Patient is currently taking:      [] Plavix (Hold for 7 days)  [] Aspirin (Hold for 5 days)     [] Pletal (Hold for 2 days)  [] Pradaxa (Hold for 3 days)    [] Effient (Hold for 7 days)  [] Xarelto (Hold for 2 days)    [] Eliquis (Hold for 2 days)  [] Brilinta (Hold for 7 days)    [] Coumadin (Hold for 5 days) - (INR needs to be drawn the day prior to procedure- INR < 2.0)    [] Aggrenox (Hold for 7 days)        [] Patient will stop medication on their own.    [] Blood Thinner Form Faxed for approval to hold.    Provider form faxed to:     Assessment Completed by:  Electronically signed by Khai Mayes RN on 1/9/2023 at 11:21 AM

## 2023-01-11 ENCOUNTER — OFFICE VISIT (OUTPATIENT)
Age: 43
End: 2023-01-11
Payer: COMMERCIAL

## 2023-01-11 VITALS
DIASTOLIC BLOOD PRESSURE: 64 MMHG | BODY MASS INDEX: 19.86 KG/M2 | HEIGHT: 66 IN | TEMPERATURE: 97.2 F | OXYGEN SATURATION: 99 % | WEIGHT: 123.6 LBS | RESPIRATION RATE: 18 BRPM | HEART RATE: 63 BPM | SYSTOLIC BLOOD PRESSURE: 108 MMHG

## 2023-01-11 DIAGNOSIS — A08.4 VIRAL GASTROENTERITIS: Primary | ICD-10-CM

## 2023-01-11 DIAGNOSIS — R11.0 NAUSEA: ICD-10-CM

## 2023-01-11 DIAGNOSIS — J39.8 CONGESTION OF UPPER RESPIRATORY TRACT: ICD-10-CM

## 2023-01-11 LAB
INFLUENZA A ANTIBODY: NORMAL
INFLUENZA B ANTIBODY: NORMAL

## 2023-01-11 PROCEDURE — 99213 OFFICE O/P EST LOW 20 MIN: CPT | Performed by: PHYSICIAN ASSISTANT

## 2023-01-11 PROCEDURE — 87804 INFLUENZA ASSAY W/OPTIC: CPT | Performed by: PHYSICIAN ASSISTANT

## 2023-01-11 RX ORDER — PROMETHAZINE HYDROCHLORIDE 25 MG/1
25 TABLET ORAL 3 TIMES DAILY PRN
Qty: 12 TABLET | Refills: 0 | Status: SHIPPED | OUTPATIENT
Start: 2023-01-11 | End: 2023-01-18

## 2023-01-11 ASSESSMENT — ENCOUNTER SYMPTOMS
SINUS PRESSURE: 0
EYE PAIN: 0
SINUS PAIN: 0
NAUSEA: 1
COUGH: 0
SORE THROAT: 0
VOMITING: 1
SHORTNESS OF BREATH: 0
ABDOMINAL PAIN: 1
DIARRHEA: 1
ALLERGIC/IMMUNOLOGIC NEGATIVE: 1

## 2023-01-11 NOTE — PROGRESS NOTES
[address]  [address] [address] [place] [address]  Dept: [phone]  Dept Fax: [phone]  Loc: [phone]    Kemal Pendleton is a 38 y.o. female who presents today for her medical conditions/complaints as noted below. Kemal Pendleton is complaining of Fever, Otalgia, Emesis, Diarrhea, and Congestion    HPI:   Pt presents with n/v/d that started last night. She states that she has zofran and phenergan at home that helps just minimally. She admits to feeling as though she had a fever this morning. She states her daughter was diagnosed with the flu last week. Also of not, pt was prescribed clindamycin that was completed about a week ago.      Past Medical History:   Diagnosis Date    ADHD (attention deficit hyperactivity disorder)     Clotting disorder (HCC)     factor 14, 11    GERD (gastroesophageal reflux disease)     Headache        Past Surgical History:   Procedure Laterality Date    BREAST ENHANCEMENT SURGERY      CHOLECYSTECTOMY      COLONOSCOPY  2005    \"NORMAL\" PER PT RECALL    COLONOSCOPY  2019 [address] [facility], [place]- \" normal\" per pt recall    AR COLONOSCOPY FLX DX W/COLLJ SPEC WHEN PFRMD N/A 7/23/2018    Dr Fred Underwood, 15 yr (age 50) recall    AR EGD TRANSORAL BIOPSY SINGLE/MULTIPLE N/A 7/23/2018    Dr Nafisa Shafer (-) Cathy, (+) Barrettt's, (-) dysplasia--1st dx--1 yr recall    TONSILLECTOMY AND ADENOIDECTOMY      UPPER GASTROINTESTINAL ENDOSCOPY  2005    \"NORMAL\" PER PT RECALL    UPPER GASTROINTESTINAL ENDOSCOPY  2019 [address] [facility], [place]- \"normal\" per pt recall    UPPER GASTROINTESTINAL ENDOSCOPY N/A 9/30/2020    Dr Lady Menezes exam,  NEG EoE, Neg Barretts, no recall       Family History   Problem Relation Age of Onset    Esophageal Cancer Paternal Uncle     Colon Cancer Neg Hx     Colon Polyps Neg Hx     Liver Cancer Neg Hx     Liver Disease Neg Hx     Stomach Cancer Neg Hx     Rectal Cancer Neg Hx        Social History     Tobacco Use Smoking status: Former     Types: Cigarettes    Smokeless tobacco: Never   Substance Use Topics    Alcohol use: Yes     Alcohol/week: 0.0 - 3.0 standard drinks        Current Outpatient Medications   Medication Sig Dispense Refill    promethazine (PHENERGAN) 25 MG tablet Take 1 tablet by mouth 3 times daily as needed for Nausea 12 tablet 0    pseudoephedrine (SUDAFED) 30 MG tablet Take 30 mg by mouth every 4 hours as needed for Congestion      methocarbamol (ROBAXIN) 500 MG tablet Take 1 tablet by mouth 3 times daily 90 tablet 2    butalbital-acetaminophen-caffeine (FIORICET, ESGIC) -40 MG per tablet Take 1 tablet by mouth every 6 hours as needed for Headaches 45 tablet 0    clonazePAM (KLONOPIN) 0.5 MG tablet Take 1 tablet by mouth 2 times daily as needed for Anxiety for up to 30 days. 45 tablet 0    amphetamine-dextroamphetamine (ADDERALL XR) 30 MG extended release capsule Take 1 capsule by mouth daily for 30 days. 30 capsule 0    norethindrone-ethinyl estradiol-iron (LOESTRIN FE 1.5/30) 1.5-30 MG-MCG tablet Take 1 tablet by mouth daily 1 packet 3    ondansetron (ZOFRAN) 8 MG tablet Take 1 tablet by mouth every 8 hours as needed for Nausea or Vomiting 20 tablet 1    topiramate (TOPAMAX) 200 MG tablet Take 1 tablet by mouth 2 times daily 180 tablet 3     No current facility-administered medications for this visit.        Allergies   Allergen Reactions    Iv Dye [Iodides] Itching     Itchy throat, redness    Red Dye Swelling     Throat swells    Shellfish-Derived Products Swelling     tongue and throat swells    Oxaprozin     Shrimp Flavor     Tyloxapol        Health Maintenance   Topic Date Due    COVID-19 Vaccine (1) Never done    Flu vaccine (1) 08/01/2022    Cervical cancer screen  11/20/2022    Depression Screen  09/22/2023    DTaP/Tdap/Td vaccine (2 - Td or Tdap) 07/11/2024    Lipids  09/20/2024    Colorectal Cancer Screen  07/23/2032    Hepatitis C screen  Completed    HIV screen  Completed    Hepatitis A vaccine  Aged Out    Hib vaccine  Aged Out    Meningococcal (ACWY) vaccine  Aged Out    Pneumococcal 0-64 years Vaccine  Aged Out    Varicella vaccine  Discontinued       Subjective:   Review of Systems   Constitutional:  Positive for fatigue. Negative for chills and fever. HENT:  Negative for congestion, postnasal drip, sinus pressure, sinus pain and sore throat. Eyes:  Negative for pain and visual disturbance. Respiratory:  Negative for cough and shortness of breath. Cardiovascular:  Negative for chest pain. Gastrointestinal:  Positive for abdominal pain, diarrhea, nausea and vomiting. Endocrine: Negative for cold intolerance and heat intolerance. Genitourinary:  Negative for frequency, hematuria and urgency. Musculoskeletal:  Negative for myalgias. Skin:  Negative for rash. Allergic/Immunologic: Negative. Neurological:  Negative for syncope, weakness, light-headedness and headaches. Hematological: Negative. Psychiatric/Behavioral: Negative. Objective    Physical Exam  Vitals and nursing note reviewed. Constitutional:       General: She is not in acute distress. Appearance: Normal appearance. She is ill-appearing. HENT:      Head: Normocephalic and atraumatic. Right Ear: External ear normal.      Left Ear: External ear normal.      Ears:      Comments: Bilateral Effusions     Nose: Nose normal.      Mouth/Throat:      Mouth: Mucous membranes are moist.      Pharynx: Oropharynx is clear. Eyes:      Extraocular Movements: Extraocular movements intact. Conjunctiva/sclera: Conjunctivae normal.   Cardiovascular:      Rate and Rhythm: Normal rate and regular rhythm. Pulses: Normal pulses. Heart sounds: Normal heart sounds. Pulmonary:      Effort: Pulmonary effort is normal. No respiratory distress. Breath sounds: Normal breath sounds. No stridor. No wheezing, rhonchi or rales. Chest:      Chest wall: No tenderness.    Abdominal:      General: Abdomen is flat. Bowel sounds are normal. There is no distension. Palpations: Abdomen is soft. Tenderness: There is abdominal tenderness. Musculoskeletal:         General: Normal range of motion. Cervical back: Normal range of motion and neck supple. No tenderness. Skin:     General: Skin is warm and dry. Findings: No erythema. Neurological:      General: No focal deficit present. Mental Status: She is alert and oriented to person, place, and time. Psychiatric:         Mood and Affect: Mood normal.         Behavior: Behavior normal.       /64 (Site: Left Upper Arm)   Pulse 63   Temp 97.2 °F (36.2 °C) (Temporal)   Resp 18   Ht 5' 6\" (1.676 m)   Wt 123 lb 9.6 oz (56.1 kg)   SpO2 99%   BMI 19.95 kg/m²     Assessment         Diagnosis Orders   1. Viral gastroenteritis  promethazine (PHENERGAN) 25 MG tablet      2. Congestion of upper respiratory tract  POCT Influenza A/B      3. Nausea            Plan   Phenergan sent to pharmacy- take as directed. Pt was just on clindamycin for skin infection that was completed about a week ago. Work excuse, pt can return Monday. Pt has exposure to Flu, daughter diagnosed last week. Pt to continue rest and liquid diet. Present to ED if you are unable to hold down liquids, have severe pain, feel faint or have any other concerning symptoms needing emergent evaluation and treatment. Patient verbalizes understanding and agrees with treatment plan.      Orders Placed This Encounter   Procedures    POCT Influenza A/B       Results for orders placed or performed in visit on 01/11/23   POCT Influenza A/B   Result Value Ref Range    Influenza A Ab neg     Influenza B Ab neg        Orders Placed This Encounter   Medications    promethazine (PHENERGAN) 25 MG tablet     Sig: Take 1 tablet by mouth 3 times daily as needed for Nausea     Dispense:  12 tablet     Refill:  0      New Prescriptions    PROMETHAZINE (PHENERGAN) 25 MG TABLET    Take 1 tablet by mouth 3 times daily as needed for Nausea        Return if symptoms worsen or fail to improve. Discussed use, benefits, and side effects of any prescribed medications. All patient questions were answered. Patient voiced understanding of care plan. Patient was given educational materials - see patient instructions below. Patient Instructions   Phenergan sent to pharmacy- take as directed. Pt was just on clindamycin for skin infection that was completed about a week ago. Work excuse, pt can return Monday. Pt has exposure to Flu, daughter diagnosed last week. Pt to continue rest and liquid diet. Present to ED if you are unable to hold down liquids, have severe pain, feel faint or have any other concerning symptoms needing emergent evaluation and treatment. Patient verbalizes understanding and agrees with treatment plan.        Electronically signed by Juan Carlos Bernard PA-C on 1/11/2023 at 4:03 PM

## 2023-01-11 NOTE — PATIENT INSTRUCTIONS
Phenergan sent to pharmacy- take as directed. Pt was just on clindamycin for skin infection that was completed about a week ago. Work excuse, pt can return Monday. Pt has exposure to Flu, daughter diagnosed last week. Pt to continue rest and liquid diet. Present to ED if you are unable to hold down liquids, have severe pain, feel faint or have any other concerning symptoms needing emergent evaluation and treatment. Patient verbalizes understanding and agrees with treatment plan.

## 2023-01-11 NOTE — LETTER
[facility]  [address]  [address] [address]  Phone: [phone]  Fax: [phone]    Getachew Salomon        January 11, 2023     Patient: Ailyn Wheeler   YOB: 1980   Date of Visit: 1/11/2023       To Whom It May Concern: It is my medical opinion that Ailyn Wheeler should remain out of work until 1/16/2023. If you have any questions or concerns, please don't hesitate to call.     Sincerely,        Marya Bryant PA-C

## 2023-01-15 DIAGNOSIS — F90.9 ATTENTION DEFICIT HYPERACTIVITY DISORDER (ADHD), UNSPECIFIED ADHD TYPE: ICD-10-CM

## 2023-01-15 DIAGNOSIS — F41.9 ANXIETY: ICD-10-CM

## 2023-01-16 RX ORDER — CLONAZEPAM 0.5 MG/1
0.5 TABLET ORAL 2 TIMES DAILY PRN
Qty: 45 TABLET | Refills: 0 | Status: SHIPPED | OUTPATIENT
Start: 2023-01-16 | End: 2023-02-15

## 2023-01-16 RX ORDER — DEXTROAMPHETAMINE SACCHARATE, AMPHETAMINE ASPARTATE MONOHYDRATE, DEXTROAMPHETAMINE SULFATE AND AMPHETAMINE SULFATE 7.5; 7.5; 7.5; 7.5 MG/1; MG/1; MG/1; MG/1
30 CAPSULE, EXTENDED RELEASE ORAL DAILY
Qty: 30 CAPSULE | Refills: 0 | Status: SHIPPED | OUTPATIENT
Start: 2023-01-16 | End: 2023-02-15

## 2023-01-16 NOTE — TELEPHONE ENCOUNTER
PDMP Monitoring:    Last PDMP Jodee Martínez as Reviewed [facility]):  Review User Review Instant Review Result   SUKHWINDER SLADE 11/15/2022  5:00 PM Reviewed PDMP [1]     Urine Drug Screenings (1 yr)    No resulted procedures found.        Medication Contract and Consent for Opioid Use Documents Filed      No documents found

## 2023-01-25 DIAGNOSIS — R51.9 ACUTE NONINTRACTABLE HEADACHE, UNSPECIFIED HEADACHE TYPE: ICD-10-CM

## 2023-01-26 RX ORDER — BUTALBITAL, ACETAMINOPHEN AND CAFFEINE 50; 325; 40 MG/1; MG/1; MG/1
1 TABLET ORAL EVERY 6 HOURS PRN
Qty: 45 TABLET | Refills: 0 | Status: SHIPPED | OUTPATIENT
Start: 2023-01-26

## 2023-02-09 ENCOUNTER — OFFICE VISIT (OUTPATIENT)
Dept: PRIMARY CARE CLINIC | Age: 43
End: 2023-02-09
Payer: COMMERCIAL

## 2023-02-09 VITALS
HEART RATE: 101 BPM | HEIGHT: 66 IN | TEMPERATURE: 97.1 F | SYSTOLIC BLOOD PRESSURE: 104 MMHG | OXYGEN SATURATION: 99 % | WEIGHT: 119 LBS | BODY MASS INDEX: 19.13 KG/M2 | DIASTOLIC BLOOD PRESSURE: 60 MMHG

## 2023-02-09 DIAGNOSIS — F41.9 ANXIETY: ICD-10-CM

## 2023-02-09 DIAGNOSIS — F90.9 ATTENTION DEFICIT HYPERACTIVITY DISORDER (ADHD), UNSPECIFIED ADHD TYPE: ICD-10-CM

## 2023-02-09 DIAGNOSIS — Z00.00 ENCOUNTER FOR WELL ADULT EXAM WITHOUT ABNORMAL FINDINGS: Primary | ICD-10-CM

## 2023-02-09 DIAGNOSIS — R23.0 EXTREMITY CYANOSIS: ICD-10-CM

## 2023-02-09 PROCEDURE — 99396 PREV VISIT EST AGE 40-64: CPT | Performed by: FAMILY MEDICINE

## 2023-02-09 RX ORDER — AMOXICILLIN 500 MG/1
CAPSULE ORAL
COMMUNITY
Start: 2023-02-06 | End: 2023-02-09

## 2023-02-09 SDOH — ECONOMIC STABILITY: FOOD INSECURITY: WITHIN THE PAST 12 MONTHS, YOU WORRIED THAT YOUR FOOD WOULD RUN OUT BEFORE YOU GOT MONEY TO BUY MORE.: NEVER TRUE

## 2023-02-09 SDOH — ECONOMIC STABILITY: FOOD INSECURITY: WITHIN THE PAST 12 MONTHS, THE FOOD YOU BOUGHT JUST DIDN'T LAST AND YOU DIDN'T HAVE MONEY TO GET MORE.: NEVER TRUE

## 2023-02-09 SDOH — ECONOMIC STABILITY: INCOME INSECURITY: HOW HARD IS IT FOR YOU TO PAY FOR THE VERY BASICS LIKE FOOD, HOUSING, MEDICAL CARE, AND HEATING?: NOT HARD AT ALL

## 2023-02-09 SDOH — ECONOMIC STABILITY: HOUSING INSECURITY
IN THE LAST 12 MONTHS, WAS THERE A TIME WHEN YOU DID NOT HAVE A STEADY PLACE TO SLEEP OR SLEPT IN A SHELTER (INCLUDING NOW)?: NO

## 2023-02-09 ASSESSMENT — ENCOUNTER SYMPTOMS
ABDOMINAL PAIN: 0
VOMITING: 0
EYE DISCHARGE: 0
BACK PAIN: 0
WHEEZING: 0
COLOR CHANGE: 0
NAUSEA: 0
COUGH: 0
DIARRHEA: 0

## 2023-02-09 ASSESSMENT — PATIENT HEALTH QUESTIONNAIRE - PHQ9
1. LITTLE INTEREST OR PLEASURE IN DOING THINGS: 0
SUM OF ALL RESPONSES TO PHQ QUESTIONS 1-9: 0
2. FEELING DOWN, DEPRESSED OR HOPELESS: 0
SUM OF ALL RESPONSES TO PHQ QUESTIONS 1-9: 0
SUM OF ALL RESPONSES TO PHQ9 QUESTIONS 1 & 2: 0

## 2023-02-09 NOTE — PROGRESS NOTES
Well Adult Note  Name: Anita Sevilla Date: 2023   MRN: [id number] Sex: Female   Age: 38 y.o. Ethnicity: Non- / Non    : 1980 Race: White (non-)      Jose Luis Pavon is here for well adult exam.  History:  Patient is seen today for yearly physical and checkup. States that overall she is doing well. She is going to be starting a new job working at [employer] at [employer]. She states that she is excited about this transition. She has not had any recent changes in her medications. She does complain of her hands and feet being cold all the time she states that whenever she gets in the shower that her arms and legs turn blue. She does not feel short of breath and has not had any chest tightness or chest pain. She states that she has never had any heart issues that she is aware of. She denies any palpitations. She states that she is not having any continued discoloration of any fingers or toes. She states that she does get some numbness and tingling in her hands and feet. Review of Systems   Constitutional:  Negative for activity change, appetite change and fever. HENT:  Negative for congestion and nosebleeds. Eyes:  Negative for discharge. Respiratory:  Negative for cough and wheezing. Cardiovascular:  Negative for chest pain and leg swelling. Gastrointestinal:  Negative for abdominal pain, diarrhea, nausea and vomiting. Genitourinary:  Negative for difficulty urinating, frequency and urgency. Musculoskeletal:  Negative for back pain and gait problem. Skin:  Negative for color change and rash. Neurological:  Negative for dizziness and headaches. Hematological:  Does not bruise/bleed easily. Psychiatric/Behavioral:  Negative for sleep disturbance and suicidal ideas.       Allergies   Allergen Reactions    Iv Dye [Iodides] Itching     Itchy throat, redness    Red Dye Swelling     Throat swells    Shellfish-Derived Products Swelling     tongue and throat swells    Oxaprozin     Shrimp Flavor     Tyloxapol          Prior to Visit Medications    Medication Sig Taking? Authorizing Provider   butalbital-acetaminophen-caffeine (FIORICET, ESGIC) -40 MG per tablet Take 1 tablet by mouth every 6 hours as needed for Headaches Yes Marla Cortez MD   clonazePAM (KLONOPIN) 0.5 MG tablet Take 1 tablet by mouth 2 times daily as needed for Anxiety for up to 30 days. Yes Jorge Valerio MD   amphetamine-dextroamphetamine (ADDERALL XR) 30 MG extended release capsule Take 1 capsule by mouth daily for 30 days.  Yes Jorge Valerio MD   pseudoephedrine (SUDAFED) 30 MG tablet Take 30 mg by mouth every 4 hours as needed for Congestion Yes Historical Provider, MD   methocarbamol (ROBAXIN) 500 MG tablet Take 1 tablet by mouth 3 times daily Yes ALVERTO Burch - CNP   norethindrone-ethinyl estradiol-iron (LOESTRIN FE 1.5/30) 1.5-30 MG-MCG tablet Take 1 tablet by mouth daily Yes Jorge Valerio MD   ondansetron (ZOFRAN) 8 MG tablet Take 1 tablet by mouth every 8 hours as needed for Nausea or Vomiting Yes Jorge Valerio MD   topiramate (TOPAMAX) 200 MG tablet Take 1 tablet by mouth 2 times daily Yes Jorge Valerio MD         Past Medical History:   Diagnosis Date    ADHD (attention deficit hyperactivity disorder)     Clotting disorder (HCC)     factor 14, 11    GERD (gastroesophageal reflux disease)     Headache        Past Surgical History:   Procedure Laterality Date    BREAST ENHANCEMENT SURGERY      CHOLECYSTECTOMY      COLONOSCOPY  2005    \"NORMAL\" PER PT RECALL    COLONOSCOPY  2019 [address] [place], [place]- \" normal\" per pt recall    DC COLONOSCOPY FLX DX W/COLLJ SPEC WHEN PFRMD N/A 7/23/2018    Dr Hubert Gamez, 15 yr (age 50) recall    DC EGD TRANSORAL BIOPSY SINGLE/MULTIPLE N/A 7/23/2018    Dr Bianca Anaya (-) Cathy, (+) Barrettt's, (-) dysplasia--1st dx--1 yr recall    TONSILLECTOMY AND ADENOIDECTOMY      UPPER GASTROINTESTINAL ENDOSCOPY  2005    \"NORMAL\" PER PT RECALL    UPPER GASTROINTESTINAL ENDOSCOPY  2019    [facility]- \"normal\" per pt recall    UPPER GASTROINTESTINAL ENDOSCOPY N/A 9/30/2020    Dr Roblero Flatness exam,  NEG EoE, Neg Barretts, no recall         Family History   Problem Relation Age of Onset    Esophageal Cancer Paternal Uncle     Colon Cancer Neg Hx     Colon Polyps Neg Hx     Liver Cancer Neg Hx     Liver Disease Neg Hx     Stomach Cancer Neg Hx     Rectal Cancer Neg Hx        Social History     Tobacco Use    Smoking status: Former     Types: Cigarettes    Smokeless tobacco: Never   Vaping Use    Vaping Use: Never used   Substance Use Topics    Alcohol use: Yes     Alcohol/week: 0.0 - 3.0 standard drinks    Drug use: Never       Objective   /60   Pulse (!) 101   Temp 97.1 °F (36.2 °C)   Ht 5' 6\" (1.676 m)   Wt 119 lb (54 kg)   SpO2 99%   BMI 19.21 kg/m²   Wt Readings from Last 3 Encounters:   02/09/23 119 lb (54 kg)   01/11/23 123 lb 9.6 oz (56.1 kg)   01/09/23 122 lb (55.3 kg)     Physical Exam  Constitutional:       Appearance: Normal appearance. She is well-developed. HENT:      Head: Normocephalic and atraumatic. Mouth/Throat:      Mouth: Mucous membranes are moist.   Neck:      Thyroid: No thyroid mass or thyromegaly. Vascular: No carotid bruit. Cardiovascular:      Rate and Rhythm: Normal rate and regular rhythm. Pulses: Normal pulses. Heart sounds: No murmur heard. No friction rub. No gallop. Pulmonary:      Effort: Pulmonary effort is normal. No respiratory distress. Breath sounds: Normal breath sounds. Abdominal:      General: Abdomen is flat. Bowel sounds are normal.      Palpations: Abdomen is soft. Tenderness: There is no abdominal tenderness. Musculoskeletal:      Cervical back: Normal range of motion and neck supple. Lymphadenopathy:      Cervical: No cervical adenopathy. Skin:     General: Skin is warm and dry. Findings: No rash. Neurological:      General: No focal deficit present. Mental Status: She is alert and oriented to person, place, and time. Mental status is at baseline. Psychiatric:         Mood and Affect: Mood normal.         Behavior: Behavior normal.         Thought Content: Thought content normal.         Judgment: Judgment normal.         Assessment   Plan   1. Encounter for well adult exam without abnormal findings  2. Extremity cyanosis  -     Echocardiogram complete; Future  3. Anxiety  4. Attention deficit hyperactivity disorder (ADHD), unspecified ADHD type       Personalized Preventive Plan   Current Health Maintenance Status  Immunization History   Administered Date(s) Administered    Influenza Virus Vaccine 10/07/2019, 09/25/2020, 10/01/2020, 10/26/2021    Tdap (Boostrix, Adacel) 07/11/2014        Health Maintenance   Topic Date Due    COVID-19 Vaccine (1) Never done    Flu vaccine (1) 08/01/2022    Cervical cancer screen  11/20/2022    Depression Screen  09/22/2023    DTaP/Tdap/Td vaccine (2 - Td or Tdap) 07/11/2024    Lipids  09/20/2024    Colorectal Cancer Screen  07/23/2032    Hepatitis C screen  Completed    HIV screen  Completed    Hepatitis A vaccine  Aged Out    Hib vaccine  Aged Out    Meningococcal (ACWY) vaccine  Aged Out    Pneumococcal 0-64 years Vaccine  Aged Out    Varicella vaccine  Discontinued     Recommendations for [employer] Due: see orders and patient instructions/AVS.    No follow-ups on file.

## 2023-02-12 DIAGNOSIS — F41.9 ANXIETY: ICD-10-CM

## 2023-02-13 RX ORDER — CLONAZEPAM 0.5 MG/1
0.5 TABLET ORAL 2 TIMES DAILY PRN
Qty: 45 TABLET | Refills: 0 | Status: SHIPPED | OUTPATIENT
Start: 2023-02-13 | End: 2023-03-15

## 2023-02-13 NOTE — TELEPHONE ENCOUNTER
Provider needs to review PDMP    PDMP Monitoring:    Last PDMP Kendra Kidd as Reviewed [facility]):  Review User Review Instant Review Result   SUKHWINDER SLADE 11/15/2022  5:00 PM Reviewed PDMP [1]     Urine Drug Screenings (1 yr)    No resulted procedures found.        Medication Contract and Consent for Opioid Use Documents Filed      No documents found

## 2023-02-15 ENCOUNTER — PATIENT MESSAGE (OUTPATIENT)
Dept: PRIMARY CARE CLINIC | Age: 43
End: 2023-02-15

## 2023-02-15 DIAGNOSIS — F90.9 ATTENTION DEFICIT HYPERACTIVITY DISORDER (ADHD), UNSPECIFIED ADHD TYPE: ICD-10-CM

## 2023-02-15 RX ORDER — AMOXICILLIN 500 MG/1
500 CAPSULE ORAL 2 TIMES DAILY
Qty: 14 CAPSULE | Refills: 0 | Status: SHIPPED | OUTPATIENT
Start: 2023-02-15 | End: 2023-02-22

## 2023-02-15 RX ORDER — DEXTROAMPHETAMINE SACCHARATE, AMPHETAMINE ASPARTATE MONOHYDRATE, DEXTROAMPHETAMINE SULFATE AND AMPHETAMINE SULFATE 7.5; 7.5; 7.5; 7.5 MG/1; MG/1; MG/1; MG/1
30 CAPSULE, EXTENDED RELEASE ORAL DAILY
Qty: 30 CAPSULE | Refills: 0 | Status: SHIPPED | OUTPATIENT
Start: 2023-02-15 | End: 2023-03-17

## 2023-02-15 NOTE — TELEPHONE ENCOUNTER
PDMP Monitoring:    Last PDMP Zaida Last as Reviewed [facility]):  Review User Review Instant Review Result   SUKHWINDER SLADE 2/13/2023  3:26 PM Reviewed PDMP [1]     Urine Drug Screenings (1 yr)    No resulted procedures found.        Medication Contract and Consent for Opioid Use Documents Filed      No documents found

## 2023-02-15 NOTE — TELEPHONE ENCOUNTER
From: Bhavya Turpin  To: Dr. Veronica Wilder: 2/15/2023 6:15 AM CST  Subject: My tooth     Can I get an antibiotic for my tooth I cant take off right now to go to the dentist but I need something till I can go. Statement Selected

## 2023-02-24 ENCOUNTER — HOSPITAL ENCOUNTER (OUTPATIENT)
Dept: NON INVASIVE DIAGNOSTICS | Age: 43
Discharge: HOME | End: 2023-02-24
Payer: COMMERCIAL

## 2023-02-24 DIAGNOSIS — R23.0 EXTREMITY CYANOSIS: ICD-10-CM

## 2023-02-24 LAB
LV EF: 58 %
LVEF MODALITY: NORMAL

## 2023-02-24 PROCEDURE — C8929 TTE W OR WO FOL WCON,DOPPLER: HCPCS

## 2023-02-24 PROCEDURE — 6360000004 HC RX CONTRAST MEDICATION: Performed by: INTERNAL MEDICINE

## 2023-02-24 RX ADMIN — PERFLUTREN 1.5 ML: 6.52 INJECTION, SUSPENSION INTRAVENOUS at 11:29

## 2023-02-28 DIAGNOSIS — D72.819 LEUKOPENIA, UNSPECIFIED TYPE: Primary | ICD-10-CM

## 2023-02-28 NOTE — PROGRESS NOTES
chronic mild thrombocytopenia and leukopenia along with concern for bleeding disorder, or sooner, if needed  CBC in 4 weeks-peripheral  Continue to follow with other medical providers as recommended  Labs at next visit: CBC    I spent a total of 67 minutes on this encounter on 4/6/2023 to include review of labs, radiographic imaging, previous provider documentation, performing medically appropriate examination, ordering tests, patient education and documenting clinical information in the electronic medical record. EMR Dragon/Transcription disclaimer:   Much of this encounter note is an electronic transcription/translation of spoken language to printed text. The electronic translation of spoken language may permit erroneous, or at times, nonsensical words or phrases to be inadvertently transcribed; although attempts have made to review the note for such errors, some may still exist.  Please excuse any unrecognized transcription errors and contact us if the error is unintelligible or needs documented correction. Also, portions of this note have been copied forward, however, changed to reflect the most current clinical status of this patient. Electronically signed by ALVERTO Toussaint on 4/6/2023 at 5:00 PM  Hedy LEVIN, am starting this note as a registered nurse for ALVERTO Pemberton.

## 2023-03-02 DIAGNOSIS — R51.9 ACUTE NONINTRACTABLE HEADACHE, UNSPECIFIED HEADACHE TYPE: ICD-10-CM

## 2023-03-02 RX ORDER — BUTALBITAL, ACETAMINOPHEN AND CAFFEINE 50; 325; 40 MG/1; MG/1; MG/1
1 TABLET ORAL EVERY 6 HOURS PRN
Qty: 45 TABLET | Refills: 0 | Status: SHIPPED | OUTPATIENT
Start: 2023-03-02

## 2023-03-02 NOTE — TELEPHONE ENCOUNTER
PDMP Monitoring:    Last PDMP Jimmie Nyhan as Reviewed [facility]):  Review User Review Instant Review Result   SUKHWINDER SLADE 2/13/2023  3:26 PM Reviewed PDMP [1]     Urine Drug Screenings (1 yr)    No resulted procedures found.        Medication Contract and Consent for Opioid Use Documents Filed      No documents found

## 2023-03-10 ENCOUNTER — PATIENT MESSAGE (OUTPATIENT)
Dept: PRIMARY CARE CLINIC | Age: 43
End: 2023-03-10

## 2023-03-10 DIAGNOSIS — F41.9 ANXIETY: ICD-10-CM

## 2023-03-13 RX ORDER — CLONAZEPAM 1 MG/1
1 TABLET ORAL 2 TIMES DAILY PRN
Qty: 45 TABLET | Refills: 0 | Status: SHIPPED | OUTPATIENT
Start: 2023-03-13 | End: 2023-04-12

## 2023-03-15 DIAGNOSIS — F90.9 ATTENTION DEFICIT HYPERACTIVITY DISORDER (ADHD), UNSPECIFIED ADHD TYPE: ICD-10-CM

## 2023-03-15 RX ORDER — DEXTROAMPHETAMINE SACCHARATE, AMPHETAMINE ASPARTATE MONOHYDRATE, DEXTROAMPHETAMINE SULFATE AND AMPHETAMINE SULFATE 7.5; 7.5; 7.5; 7.5 MG/1; MG/1; MG/1; MG/1
30 CAPSULE, EXTENDED RELEASE ORAL DAILY
Qty: 30 CAPSULE | Refills: 0 | Status: SHIPPED | OUTPATIENT
Start: 2023-03-15 | End: 2023-04-10 | Stop reason: SDUPTHER

## 2023-03-15 SDOH — ECONOMIC STABILITY: FOOD INSECURITY: WITHIN THE PAST 12 MONTHS, THE FOOD YOU BOUGHT JUST DIDN'T LAST AND YOU DIDN'T HAVE MONEY TO GET MORE.: NEVER TRUE

## 2023-03-15 SDOH — ECONOMIC STABILITY: TRANSPORTATION INSECURITY
IN THE PAST 12 MONTHS, HAS LACK OF TRANSPORTATION KEPT YOU FROM MEETINGS, WORK, OR FROM GETTING THINGS NEEDED FOR DAILY LIVING?: NO

## 2023-03-15 SDOH — ECONOMIC STABILITY: FOOD INSECURITY: WITHIN THE PAST 12 MONTHS, YOU WORRIED THAT YOUR FOOD WOULD RUN OUT BEFORE YOU GOT MONEY TO BUY MORE.: NEVER TRUE

## 2023-03-15 SDOH — ECONOMIC STABILITY: INCOME INSECURITY: HOW HARD IS IT FOR YOU TO PAY FOR THE VERY BASICS LIKE FOOD, HOUSING, MEDICAL CARE, AND HEATING?: NOT VERY HARD

## 2023-03-22 ENCOUNTER — HOSPITAL ENCOUNTER (OUTPATIENT)
Dept: PAIN MANAGEMENT | Age: 43
Discharge: HOME | End: 2023-03-22
Payer: COMMERCIAL

## 2023-03-22 ENCOUNTER — OFFICE VISIT (OUTPATIENT)
Dept: PRIMARY CARE CLINIC | Age: 43
End: 2023-03-22
Payer: COMMERCIAL

## 2023-03-22 VITALS
OXYGEN SATURATION: 100 % | DIASTOLIC BLOOD PRESSURE: 76 MMHG | SYSTOLIC BLOOD PRESSURE: 114 MMHG | HEART RATE: 78 BPM | BODY MASS INDEX: 18.83 KG/M2 | WEIGHT: 120 LBS | RESPIRATION RATE: 20 BRPM | HEIGHT: 67 IN | TEMPERATURE: 98 F

## 2023-03-22 VITALS
TEMPERATURE: 96.8 F | HEART RATE: 87 BPM | OXYGEN SATURATION: 100 % | DIASTOLIC BLOOD PRESSURE: 72 MMHG | SYSTOLIC BLOOD PRESSURE: 108 MMHG | RESPIRATION RATE: 16 BRPM

## 2023-03-22 DIAGNOSIS — F41.9 ANXIETY: ICD-10-CM

## 2023-03-22 DIAGNOSIS — M25.552 BILATERAL HIP PAIN: ICD-10-CM

## 2023-03-22 DIAGNOSIS — G43.909 MIGRAINE WITHOUT STATUS MIGRAINOSUS, NOT INTRACTABLE, UNSPECIFIED MIGRAINE TYPE: ICD-10-CM

## 2023-03-22 DIAGNOSIS — M25.551 BILATERAL HIP PAIN: ICD-10-CM

## 2023-03-22 DIAGNOSIS — F90.9 ATTENTION DEFICIT HYPERACTIVITY DISORDER (ADHD), UNSPECIFIED ADHD TYPE: Primary | ICD-10-CM

## 2023-03-22 DIAGNOSIS — G43.109 MIGRAINE WITH AURA AND WITHOUT STATUS MIGRAINOSUS, NOT INTRACTABLE: Primary | ICD-10-CM

## 2023-03-22 PROCEDURE — 99214 OFFICE O/P EST MOD 30 MIN: CPT | Performed by: FAMILY MEDICINE

## 2023-03-22 PROCEDURE — 6360000002 HC RX W HCPCS

## 2023-03-22 PROCEDURE — A4216 STERILE WATER/SALINE, 10 ML: HCPCS

## 2023-03-22 PROCEDURE — 20553 NJX 1/MLT TRIGGER POINTS 3/>: CPT

## 2023-03-22 PROCEDURE — 2580000003 HC RX 258

## 2023-03-22 PROCEDURE — 2500000003 HC RX 250 WO HCPCS

## 2023-03-22 PROCEDURE — 64615 CHEMODENERV MUSC MIGRAINE: CPT

## 2023-03-22 RX ORDER — MELOXICAM 15 MG/1
15 TABLET ORAL DAILY
Qty: 90 TABLET | Refills: 1 | Status: SHIPPED | OUTPATIENT
Start: 2023-03-22

## 2023-03-22 RX ORDER — BUPIVACAINE HYDROCHLORIDE 5 MG/ML
10 INJECTION, SOLUTION EPIDURAL; INTRACAUDAL ONCE
Status: DISCONTINUED | OUTPATIENT
Start: 2023-03-22 | End: 2023-03-24 | Stop reason: HOSPADM

## 2023-03-22 RX ORDER — SODIUM CHLORIDE 9 MG/ML
4.5 INJECTION INTRAVENOUS ONCE
Status: DISCONTINUED | OUTPATIENT
Start: 2023-03-22 | End: 2023-03-24 | Stop reason: HOSPADM

## 2023-03-22 RX ORDER — LIDOCAINE HYDROCHLORIDE 10 MG/ML
10 INJECTION, SOLUTION EPIDURAL; INFILTRATION; INTRACAUDAL; PERINEURAL ONCE
Status: DISCONTINUED | OUTPATIENT
Start: 2023-03-22 | End: 2023-03-24 | Stop reason: HOSPADM

## 2023-03-22 ASSESSMENT — ENCOUNTER SYMPTOMS
EYE DISCHARGE: 0
VOMITING: 0
COUGH: 0
NAUSEA: 0
ABDOMINAL PAIN: 0
BACK PAIN: 0
WHEEZING: 0
COLOR CHANGE: 0
DIARRHEA: 0

## 2023-03-22 NOTE — PROCEDURES
[address]    Patient Name: Elian Olea    : 1980                    Age: 38 y.o. Sex: female    Date: 2023    Pre-op Diagnosis: Chronic Migraines    Post-op Diagnosis: Chronic Migraines    Procedure: Botox injections x 155 units (45 units wasted)    Performing Procedure: ALVERTO Araujo    Patient Vitals for the past 24 hrs:   BP Temp Temp src Pulse Resp SpO2   23 0805 108/72 96.8 °F (36 °C) Temporal 87 16 100 %       Previous Injections:  Patient had 30/15 headaches/migraines over the past month. Indications:    Elian Olea has been treated for problems with chronic migraine headaches. The patient was taken through a conservative course of treatment without complete resolution of symptoms. Botox injection therapy was offered and after the risks and benefits of the procedure were explained to the patient, we had agreed to proceed. The patient was taken to the procedure room and placed in the seated position. The following muscles were identified using palpation and standard landmarks. These muscles were marked and prepped with alcohol. A total of 155 units were injected after careful aspiration and the following distribution bilaterally:  10 units in 2 sites, procerus 5 units in one site, frontalis 20 units in 4 sites, temporalis 40 unit in 8 sites, occipitals 30 units in 6 sites, cervical paraspinals 20 units in 4 sites, and trapezius 30 units in 6 sites.     Pre-op Diagnosis: Myofascial Pain/ Muscle Spasms/ Cervicalgia    Post-op Diagnosis: Myofascial Pain/ Muscle Spasms/ Cervicalgia    Procedure: Cervical Trigger Point Injections    Performing Procedure: FADIA Araujo - BC, VA-BC    Patient Vitals for the past 24 hrs:   BP Temp Temp src Pulse Resp SpO2   23 0805 108/72 96.8 °F (36 °C) Temporal 87 16 100 %       Description of Procedure:    After a brief physical assessment and failure to improve with

## 2023-03-22 NOTE — DISCHARGE INSTRUCTIONS
[facility] Physical And Pain Medicine  Post Procedure Discharge Instructions        YOU HAVE HAD THE FOLLOWING PROCEDURE:                                  [] Occipital Nerve Blocks  [] CTS wrist injection(s)  [] Knee Injection(s)         [] Shoulder Injection(s)   [] Elbow Injection(s)     [x] Botox Injection  [x] Cervical Trigger Point Injections    [] Thoracic Trigger Point Injections    [] Lumbar Trigger Point Injections  [] Piriformis Trigger Point Injections  [] SI Joint Injection(s)     [] Trochanteric Bursa Injection(s)       [] Ankle Injection(s)   [] Plantar Fasciitis   []  ______________  Injection(s) [x] Botox [x]  Migraines [] Spasticity    YOU HAVE RECEIVED THE FOLLOWING MEDICATIONS IN YOUR INJECTION(s)  [x] Lidocaine [x] Bupivacaine   [] DepoMedrol (steroid) [] Decadron (steroid)  []  Kenalog (steroid)   [] Toradol  [] Supartz [] Fredna Chute    [x] Botox        PATIENT INFORMATION:   You may experience the following symptoms after your procedure. These symptoms are normal and should not cause concern: You may have an increase in your pain. This may last 24 - 48 hours after your procedure. You may have no change in the pain that you had prior to your injection(s). You may have weakness or numbness in your affected extremity. If this occurs, this may last until numbing the medication wears off. REPORT THE FOLLOWING SYMPTOMS TO YOUR DOCTOR:  Redness, swelling or drainage at the injection site(s)  Unusual pain that interferes with your normal activities of daily living. OTHER INSTRUCTIONS:    [x] I will apply ice to the injection site(s) for at least 24 hours after the procedure. I will rotate the ice on for 20 minutes and off for 20 minutes for at least 24 hours. [x] I will not apply heat for at least 48 hours and I will not take a hot bath or shower for at least 24 hours.      [x] I understand that if Lidocaine or Bupivacaine was used in my injection(s) that the injection

## 2023-03-22 NOTE — PROGRESS NOTES
motion and neck supple. Skin:     General: Skin is warm and dry. Neurological:      General: No focal deficit present. Mental Status: She is alert and oriented to person, place, and time. Mental status is at baseline. Psychiatric:         Mood and Affect: Mood normal.         Behavior: Behavior normal.         Thought Content: Thought content normal.         Judgment: Judgment normal.      /76   Pulse 78   Temp 98 °F (36.7 °C) (Tympanic)   Resp 20   Ht 5' 7\" (1.702 m)   Wt 120 lb (54.4 kg)   SpO2 100%   BMI 18.79 kg/m²      ASSESSMENT/PLAN:    1. Attention deficit hyperactivity disorder (ADHD), unspecified ADHD type  2. Anxiety  3. Migraine without status migrainosus, not intractable, unspecified migraine type  4. Bilateral hip pain     Continue Adderall XR for ADHD. Continue Klonopin for anxiety. Continue to follow with neurology regarding her migraines. Follow-up with us in 6 months for checkup unless needed sooner. I have sent meloxicam over for her bilateral hip pain. If this is not helping she is to let us know. PDMP Monitoring:    Last PDMP Evangelist Clayton as Reviewed [facility]):  Review User Review Instant Review Result   Virgilio Vieira 3/15/2023  5:00 PM Reviewed PDMP [1]       Urine Drug Screenings (1 yr)    No resulted procedures found. Medication Contract and Consent for Opioid Use Documents Filed        No documents found                     EMR Dragon/transcription disclaimer:  Much of this encounter note is electronic transcription/translation of spoken language toprinted texts. The electronic translation of spoken language may be erroneous, or at times, nonsensical words or phrases may be inadvertently transcribed.   Although I have reviewed the note for such errors, some may stillexist.

## 2023-03-28 ENCOUNTER — TELEPHONE (OUTPATIENT)
Dept: PAIN MANAGEMENT | Age: 43
End: 2023-03-28

## 2023-03-28 DIAGNOSIS — G43.109 MIGRAINE WITH AURA AND WITHOUT STATUS MIGRAINOSUS, NOT INTRACTABLE: Primary | ICD-10-CM

## 2023-04-06 ENCOUNTER — HOSPITAL ENCOUNTER (OUTPATIENT)
Dept: INFUSION THERAPY | Age: 43
Discharge: HOME | End: 2023-04-06
Payer: COMMERCIAL

## 2023-04-06 ENCOUNTER — OFFICE VISIT (OUTPATIENT)
Dept: HEMATOLOGY | Age: 43
End: 2023-04-06
Payer: COMMERCIAL

## 2023-04-06 VITALS
HEART RATE: 78 BPM | BODY MASS INDEX: 18.52 KG/M2 | DIASTOLIC BLOOD PRESSURE: 74 MMHG | SYSTOLIC BLOOD PRESSURE: 124 MMHG | OXYGEN SATURATION: 99 % | HEIGHT: 67 IN | WEIGHT: 118 LBS

## 2023-04-06 DIAGNOSIS — E53.8 B12 DEFICIENCY: ICD-10-CM

## 2023-04-06 DIAGNOSIS — D69.9 BLEEDING DISORDER (HCC): ICD-10-CM

## 2023-04-06 DIAGNOSIS — R20.0 NUMBNESS AND TINGLING IN BOTH HANDS: ICD-10-CM

## 2023-04-06 DIAGNOSIS — D72.819 LEUKOPENIA, UNSPECIFIED TYPE: ICD-10-CM

## 2023-04-06 DIAGNOSIS — D69.6 THROMBOCYTOPENIA (HCC): Primary | ICD-10-CM

## 2023-04-06 DIAGNOSIS — R20.2 NUMBNESS AND TINGLING IN BOTH HANDS: ICD-10-CM

## 2023-04-06 DIAGNOSIS — D69.6 THROMBOCYTOPENIA (HCC): ICD-10-CM

## 2023-04-06 LAB
APTT PPP: 28.6 SEC (ref 26–36.2)
BASOPHILS # BLD: 0.03 K/UL (ref 0.01–0.08)
BASOPHILS NFR BLD: 0.6 % (ref 0.1–1.2)
CRP SERPL HS-MCNC: <0.3 MG/DL (ref 0–0.5)
EOSINOPHIL # BLD: 0 K/UL (ref 0.04–0.54)
EOSINOPHIL NFR BLD: 0 % (ref 0.7–7)
ERYTHROCYTE [DISTWIDTH] IN BLOOD BY AUTOMATED COUNT: 12.3 % (ref 11.7–14.4)
ERYTHROCYTE [SEDIMENTATION RATE] IN BLOOD BY WESTERGREN METHOD: 2 MM/HR (ref 0–20)
FERRITIN SERPL-MCNC: 103.8 NG/ML (ref 13–150)
FIBRINOGEN PPP-MCNC: 287 MG/DL (ref 238–505)
HCT VFR BLD AUTO: 46.6 % (ref 34.1–44.9)
HGB BLD-MCNC: 14.5 G/DL (ref 11.2–15.7)
INR PPP: 1.13 (ref 0.88–1.18)
LYMPHOCYTES # BLD: 1.69 K/UL (ref 1.18–3.74)
LYMPHOCYTES NFR BLD: 31.4 % (ref 19.3–53.1)
MCH RBC QN AUTO: 32.1 PG (ref 25.6–32.2)
MCHC RBC AUTO-ENTMCNC: 31.1 G/DL (ref 32.3–35.5)
MCV RBC AUTO: 103.1 FL (ref 79.4–94.8)
MONOCYTES # BLD: 0.33 K/UL (ref 0.24–0.82)
MONOCYTES NFR BLD: 6.1 % (ref 4.7–12.5)
NEUTROPHILS # BLD: 3.33 K/UL (ref 1.56–6.13)
NEUTS SEG NFR BLD: 61.7 % (ref 34–71.1)
PLATELET # BLD AUTO: 146 K/UL (ref 182–369)
PMV BLD AUTO: 10 FL (ref 7.4–10.4)
PROTHROMBIN TIME: 14.5 SEC (ref 12–14.6)
RBC # BLD AUTO: 4.52 M/UL (ref 3.93–5.22)
RHEUMATOID FACT SER NEPH-ACNC: <10 IU/ML
VIT B12 SERPL-MCNC: 268 PG/ML (ref 211–946)
WBC # BLD AUTO: 5.39 K/UL (ref 3.98–10.04)

## 2023-04-06 PROCEDURE — 99212 OFFICE O/P EST SF 10 MIN: CPT

## 2023-04-06 PROCEDURE — 85025 COMPLETE CBC W/AUTO DIFF WBC: CPT

## 2023-04-06 PROCEDURE — 36415 COLL VENOUS BLD VENIPUNCTURE: CPT

## 2023-04-06 PROCEDURE — 99205 OFFICE O/P NEW HI 60 MIN: CPT | Performed by: NURSE PRACTITIONER

## 2023-04-06 ASSESSMENT — PROMIS GLOBAL HEALTH SCALE
IN GENERAL, WOULD YOU SAY YOUR HEALTH IS...[ON A SCALE OF 1 (POOR) TO 5 (EXCELLENT)]: 4
IN THE PAST 7 DAYS, HOW WOULD YOU RATE YOUR PAIN ON AVERAGE [ON A SCALE FROM 0 (NO PAIN) TO 10 (WORST IMAGINABLE PAIN)]?: 5
TO WHAT EXTENT ARE YOU ABLE TO CARRY OUT YOUR EVERYDAY PHYSICAL ACTIVITIES SUCH AS WALKING, CLIMBING STAIRS, CARRYING GROCERIES, OR MOVING A CHAIR [ON A SCALE OF 1 (NOT AT ALL) TO 5 (COMPLETELY)]?: 5
SUM OF RESPONSES TO QUESTIONS 2, 4, 5, & 10: 15
SUM OF RESPONSES TO QUESTIONS 3, 6, 7, & 8: 18
IN GENERAL, WOULD YOU SAY YOUR QUALITY OF LIFE IS...[ON A SCALE OF 1 (POOR) TO 5 (EXCELLENT)]: 4
IN THE PAST 7 DAYS, HOW WOULD YOU RATE YOUR FATIGUE ON AVERAGE [ON A SCALE FROM 1 (NONE) TO 5 (VERY SEVERE)]?: 4
IN GENERAL, PLEASE RATE HOW WELL YOU CARRY OUT YOUR USUAL SOCIAL ACTIVITIES (INCLUDES ACTIVITIES AT HOME, AT WORK, AND IN YOUR COMMUNITY, AND RESPONSIBILITIES AS A PARENT, CHILD, SPOUSE, EMPLOYEE, FRIEND, ETC) [ON A SCALE OF 1 (POOR) TO 5 (EXCELLENT)]?: 4
IN GENERAL, HOW WOULD YOU RATE YOUR PHYSICAL HEALTH [ON A SCALE OF 1 (POOR) TO 5 (EXCELLENT)]?: 4
IN GENERAL, HOW WOULD YOU RATE YOUR MENTAL HEALTH, INCLUDING YOUR MOOD AND YOUR ABILITY TO THINK [ON A SCALE OF 1 (POOR) TO 5 (EXCELLENT)]?: 4
IN GENERAL, HOW WOULD YOU RATE YOUR SATISFACTION WITH YOUR SOCIAL ACTIVITIES AND RELATIONSHIPS [ON A SCALE OF 1 (POOR) TO 5 (EXCELLENT)]?: 4
IN THE PAST 7 DAYS, HOW OFTEN HAVE YOU BEEN BOTHERED BY EMOTIONAL PROBLEMS, SUCH AS FEELING ANXIOUS, DEPRESSED, OR IRRITABLE [ON A SCALE FROM 1 (NEVER) TO 5 (ALWAYS)]?: 3

## 2023-04-06 ASSESSMENT — ENCOUNTER SYMPTOMS
EYE PAIN: 0
VOMITING: 0
BACK PAIN: 1
ABDOMINAL PAIN: 0
EYE DISCHARGE: 0
BLOOD IN STOOL: 0
EYE REDNESS: 0
SORE THROAT: 0
NAUSEA: 0
CONSTIPATION: 0
SHORTNESS OF BREATH: 0
COUGH: 0
RESPIRATORY NEGATIVE: 1
DIARRHEA: 0
GASTROINTESTINAL NEGATIVE: 1
WHEEZING: 0
EYES NEGATIVE: 1

## 2023-04-08 LAB — NUCLEAR IGG SER QL IA: NORMAL

## 2023-04-09 LAB
APTT IMM NP PPP: ABNORMAL SEC (ref 32–48)
APTT P HEP NEUT PPP: ABNORMAL SEC (ref 32–48)
B2 GLYCOPROT1 IGG SERPL IA-ACNC: <10 SGU
B2 GLYCOPROT1 IGM SERPL IA-ACNC: <10 SMU
CARDIOLIPIN IGG SER IA-ACNC: <10 GPL
CARDIOLIPIN IGM SER IA-ACNC: <10 MPL
CONFIRM APTT STACLOT: ABNORMAL
DRVVT SCREEN TO CONFIRM RATIO: ABNORMAL RATIO
LA 3 SCREEN W REFLEX-IMP: ABNORMAL
LA NT DPL PPP QL: ABNORMAL
MIXING DRVVT: ABNORMAL SEC (ref 33–44)
PROTHROMBIN TIME: 14.7 SEC (ref 12–15.5)
REPTILASE TIME: ABNORMAL SEC
SCREEN APTT: 40 SEC (ref 32–48)
SCREEN DRVVT: 29 SEC (ref 33–44)
THROMBIN TIME: ABNORMAL SEC (ref 14.7–19.5)

## 2023-04-10 LAB
BLOOD SMEAR REVIEW: NORMAL
FACT VIII ACT/NOR PPP: 127 % (ref 56–191)
FACT XI ACT/NOR PPP: 146 % (ref 56–153)
VWF AG ACT/NOR PPP IA: 132 % (ref 52–214)
VWF:RCO ACT/NOR PPP PL AGG: 154 % (ref 51–215)

## 2023-04-24 ENCOUNTER — HOSPITAL ENCOUNTER (OUTPATIENT)
Dept: PAIN MANAGEMENT | Age: 43
Discharge: HOME | End: 2023-04-24
Payer: COMMERCIAL

## 2023-04-24 VITALS
BODY MASS INDEX: 18.8 KG/M2 | HEART RATE: 113 BPM | WEIGHT: 117 LBS | RESPIRATION RATE: 16 BRPM | OXYGEN SATURATION: 98 % | SYSTOLIC BLOOD PRESSURE: 113 MMHG | TEMPERATURE: 98 F | HEIGHT: 66 IN | DIASTOLIC BLOOD PRESSURE: 75 MMHG

## 2023-04-24 DIAGNOSIS — M62.838 CERVICAL PARASPINAL MUSCLE SPASM: Primary | ICD-10-CM

## 2023-04-24 PROCEDURE — 99213 OFFICE O/P EST LOW 20 MIN: CPT

## 2023-04-24 PROCEDURE — 99213 OFFICE O/P EST LOW 20 MIN: CPT | Performed by: NURSE PRACTITIONER

## 2023-04-24 RX ORDER — METHOCARBAMOL 500 MG/1
500 TABLET, FILM COATED ORAL 3 TIMES DAILY
Qty: 90 TABLET | Refills: 2 | Status: SHIPPED | OUTPATIENT
Start: 2023-04-24 | End: 2023-07-23

## 2023-04-24 ASSESSMENT — ENCOUNTER SYMPTOMS
BACK PAIN: 0
PHOTOPHOBIA: 1
CONSTIPATION: 0
VISUAL CHANGE: 1

## 2023-04-24 ASSESSMENT — PAIN SCALES - GENERAL: PAINLEVEL_OUTOF10: 8

## 2023-04-24 ASSESSMENT — PAIN DESCRIPTION - LOCATION: LOCATION: HEAD

## 2023-04-24 NOTE — PROGRESS NOTES
Clinic Documentation      Education Provided:  [x] Review of Kyaw Borja  [] Agreement Review  [x] PEG Score Calculated [] PHQ Score Calculated [] ORT Score Calculated    [] Compliance Issues Discussed [] Cognitive Behavior Needs [x] Exercise [] Review of Test [] Financial Issues  [x] Tobacco/Alcohol Use Reviewed [x] Teaching [] New Patient [] Picture Obtained    Physician Plan:  [] Outgoing Referral  [] Pharmacy Consult  [] Test Ordered [x] Prescription Ordered/Changed   [] Obtained Test Results / Consult Notes        Complete if patient is withholding blood thinner for procedure     Blood Thinner Patient is currently taking:      [] Plavix (Hold for 7 days)  [] Aspirin (Hold for 5 days)     [] Pletal (Hold for 2 days)  [] Pradaxa (Hold for 3 days)    [] Effient (Hold for 7 days)  [] Xarelto (Hold for 2 days)    [] Eliquis (Hold for 2 days)  [] Brilinta (Hold for 7 days)    [] Coumadin (Hold for 5 days) - (INR needs to be drawn the day prior to procedure- INR < 2.0)    [] Aggrenox (Hold for 7 days)        [] Patient will stop medication on their own.    [] Blood Thinner Form Faxed for approval to hold.    Provider form faxed to:     Assessment Completed by:  Electronically signed by Michelle James RN on 4/24/2023 at 8:36 AM

## 2023-04-24 NOTE — TELEPHONE ENCOUNTER
1. Cervical paraspinal muscle spasm  - methocarbamol (ROBAXIN) 500 MG tablet; Take 1 tablet by mouth 3 times daily  Dispense: 90 tablet;  Refill: 2     Continue medication with refill sent at appointment no; refill sent to medical director at appointment yes, see refill encounter dated 4/24/2023

## 2023-04-24 NOTE — PROGRESS NOTES
[facility] Physical & Pain Medicine    Office Visit    Patient Name: Dread Hassan    MR #: [id number]    Account [de-identified]    : 1980    Age: 38 y.o. Sex: female    Date: 2023    PCP: Jackeline Schilling MD    Chief Complaint:   Chief Complaint   Patient presents with    Headache     Headache days , migraine days , 8/10       History of Present Illness: The patient is a 38 y.o. female who presents for procedure follow up. Patient had bilateral cervical trigger point injections 3/22/2023. Patient had at least 80-85% relief of pain from procedure(s) for at least 8 weeks. After injection, patient was able to increase activity. Patient had less pain from aggravating factors. Patient was able to decrease pain medication usage. Patient received enough pain relief from injections that the patient would like to repeat the injection(s). Patient had botox for migraines on 3/22/2023. Patient had over 100 hours of migraine freedom per month with at least 50% reduction in mean headache days per month with a decrease in severity, in intensity and in aggravating symptoms compared to pre-treatment. Yes. Patient has had improvement in quality of life with reduction in migraines and migraine symptoms Yes. Patient has had a decrease in the use of acute migraine medications such as triptians, NSAID'S, and CGRP inhibitors Yes. Patient continues to be evaluated for medication over use headaches [facility]) Yes. Headache   This is a chronic (since age of 14 after head injury) problem. The current episode started more than 1 year ago. The problem occurs constantly. The problem has been waxing and waning. The pain is located in the Bilateral and frontal region. The quality of the pain is described as throbbing, sharp and stabbing (pressure). Associated symptoms include neck pain, numbness, photophobia and a visual change.  Pertinent negatives include no back pain or

## 2023-05-09 ENCOUNTER — HOSPITAL ENCOUNTER (OUTPATIENT)
Dept: INFUSION THERAPY | Age: 43
Discharge: HOME | End: 2023-05-09
Payer: COMMERCIAL

## 2023-05-09 DIAGNOSIS — D72.819 LEUKOPENIA, UNSPECIFIED TYPE: ICD-10-CM

## 2023-05-09 LAB
ERYTHROCYTE [DISTWIDTH] IN BLOOD BY AUTOMATED COUNT: 12.6 % (ref 11.7–14.4)
HCT VFR BLD AUTO: 42.4 % (ref 34.1–44.9)
HGB BLD-MCNC: 13.7 G/DL (ref 11.2–15.7)
LYMPHOCYTES # BLD: 1.19 K/UL (ref 1.18–3.74)
LYMPHOCYTES NFR BLD: 29.3 % (ref 19.3–53.1)
MCH RBC QN AUTO: 31.9 PG (ref 25.6–32.2)
MCHC RBC AUTO-ENTMCNC: 32.3 G/DL (ref 32.3–35.5)
MCV RBC AUTO: 98.6 FL (ref 79.4–94.8)
MONOCYTES # BLD: 0.3 K/UL (ref 0.24–0.82)
MONOCYTES NFR BLD: 7.4 % (ref 4.7–12.5)
NEUTROPHILS # BLD: 2.52 K/UL (ref 1.56–6.13)
NEUTS SEG NFR BLD: 62.1 % (ref 34–71.1)
PLATELET # BLD AUTO: 114 K/UL (ref 182–369)
PMV BLD AUTO: 9.6 FL (ref 7.4–10.4)
RBC # BLD AUTO: 4.3 M/UL (ref 3.93–5.22)
WBC # BLD AUTO: 4.06 K/UL (ref 3.98–10.04)

## 2023-05-09 PROCEDURE — 36415 COLL VENOUS BLD VENIPUNCTURE: CPT

## 2023-05-09 PROCEDURE — 85025 COMPLETE CBC W/AUTO DIFF WBC: CPT

## 2023-05-10 DIAGNOSIS — F41.9 ANXIETY: ICD-10-CM

## 2023-05-11 RX ORDER — CLONAZEPAM 1 MG/1
1 TABLET ORAL 2 TIMES DAILY PRN
Qty: 45 TABLET | Refills: 0 | Status: SHIPPED | OUTPATIENT
Start: 2023-05-11 | End: 2023-06-10

## 2023-05-11 NOTE — TELEPHONE ENCOUNTER
PDMP Monitoring:    Last PDMP Bing Arteaga as Reviewed [facility]):  Review User Review Instant Review Result   Garret Fraire 3/15/2023  5:00 PM Reviewed PDMP [1]     Urine Drug Screenings (1 yr)    No resulted procedures found.        Medication Contract and Consent for Opioid Use Documents Filed      No documents found

## 2023-05-17 DIAGNOSIS — F90.9 ATTENTION DEFICIT HYPERACTIVITY DISORDER (ADHD), UNSPECIFIED ADHD TYPE: ICD-10-CM

## 2023-05-17 DIAGNOSIS — R51.9 ACUTE NONINTRACTABLE HEADACHE, UNSPECIFIED HEADACHE TYPE: ICD-10-CM

## 2023-05-17 RX ORDER — DEXTROAMPHETAMINE SACCHARATE, AMPHETAMINE ASPARTATE MONOHYDRATE, DEXTROAMPHETAMINE SULFATE AND AMPHETAMINE SULFATE 7.5; 7.5; 7.5; 7.5 MG/1; MG/1; MG/1; MG/1
30 CAPSULE, EXTENDED RELEASE ORAL DAILY
Qty: 30 CAPSULE | Refills: 0 | Status: SHIPPED | OUTPATIENT
Start: 2023-05-17 | End: 2023-06-16

## 2023-05-17 RX ORDER — BUTALBITAL, ACETAMINOPHEN AND CAFFEINE 50; 325; 40 MG/1; MG/1; MG/1
1 TABLET ORAL EVERY 6 HOURS PRN
Qty: 45 TABLET | Refills: 0 | Status: SHIPPED | OUTPATIENT
Start: 2023-05-17

## 2023-05-17 NOTE — TELEPHONE ENCOUNTER
PDMP Monitoring:    Last PDMP Erica jones Reviewed [facility]):  Review User Review Instant Review Result   Leander Courtney 3/15/2023  5:00 PM Reviewed PDMP [1]     Urine Drug Screenings (1 yr)    No resulted procedures found.        Medication Contract and Consent for Opioid Use Documents Filed      No documents found

## 2023-05-31 ENCOUNTER — OFFICE VISIT (OUTPATIENT)
Age: 43
End: 2023-05-31
Payer: COMMERCIAL

## 2023-05-31 VITALS
WEIGHT: 122 LBS | OXYGEN SATURATION: 97 % | TEMPERATURE: 98.6 F | BODY MASS INDEX: 19.15 KG/M2 | HEIGHT: 67 IN | DIASTOLIC BLOOD PRESSURE: 82 MMHG | SYSTOLIC BLOOD PRESSURE: 106 MMHG | HEART RATE: 86 BPM | RESPIRATION RATE: 18 BRPM

## 2023-05-31 DIAGNOSIS — R05.1 ACUTE COUGH: ICD-10-CM

## 2023-05-31 DIAGNOSIS — R09.89 RUNNY NOSE: ICD-10-CM

## 2023-05-31 DIAGNOSIS — J02.9 SORE THROAT: Primary | ICD-10-CM

## 2023-05-31 DIAGNOSIS — R50.9 FEVER, UNSPECIFIED FEVER CAUSE: ICD-10-CM

## 2023-05-31 LAB — S PYO AG THROAT QL: NORMAL

## 2023-05-31 PROCEDURE — 87880 STREP A ASSAY W/OPTIC: CPT | Performed by: NURSE PRACTITIONER

## 2023-05-31 PROCEDURE — 99213 OFFICE O/P EST LOW 20 MIN: CPT | Performed by: NURSE PRACTITIONER

## 2023-05-31 ASSESSMENT — ENCOUNTER SYMPTOMS
COUGH: 1
WHEEZING: 0
SHORTNESS OF BREATH: 0
RHINORRHEA: 1
EYES NEGATIVE: 1
ALLERGIC/IMMUNOLOGIC NEGATIVE: 1
SORE THROAT: 1
GASTROINTESTINAL NEGATIVE: 1

## 2023-05-31 NOTE — PROGRESS NOTES
Earnest Bumpers (:  1980) is a 38 y.o. female,Established patient, here for evaluation of the following chief complaint(s):  Cough, Fever, Congestion, and Generalized Body Aches (3 days)    Patient presents today complaining of low grade fever, cough, congestion, runny nose, body aches, sore throat for the past 3 days. Denies GI symptoms, wheezing, SOB. She has recently started an antihistamine and Mucinex. Discussed with patient that her rapid strep test is negative. I feel that her illness is viral. She asked if it was okay to sit with an 82year old woman. I informed patient that she should be 24 hours fever free to consider herself not contagious. She is to continue taking Mucinex and her antihistamine. Follow-up with PCP as needed. Care instructions discussed. Patient verbalized understanding and agrees to plan of care. ASSESSMENT/PLAN:  1. Sore throat  -     POCT rapid strep A  2. Acute cough  3. Fever, unspecified fever cause  4. Runny nose     No orders of the defined types were placed in this encounter. Return if symptoms worsen or fail to improve. Subjective   SUBJECTIVE/OBJECTIVE:  Cough  Associated symptoms include a fever, myalgias, rhinorrhea and a sore throat. Pertinent negatives include no shortness of breath or wheezing. Fever   Associated symptoms include congestion, coughing and a sore throat. Pertinent negatives include no wheezing. Generalized Body Aches  Associated symptoms include congestion, coughing, a fever, myalgias and a sore throat. Review of Systems   Constitutional:  Positive for fever. HENT:  Positive for congestion, rhinorrhea and sore throat. Eyes: Negative. Respiratory:  Positive for cough. Negative for shortness of breath and wheezing. Cardiovascular: Negative. Gastrointestinal: Negative. Endocrine: Negative. Genitourinary: Negative. Musculoskeletal:  Positive for myalgias. Skin: Negative.

## 2023-06-02 ENCOUNTER — OFFICE VISIT (OUTPATIENT)
Dept: PRIMARY CARE CLINIC | Age: 43
End: 2023-06-02

## 2023-06-02 VITALS
BODY MASS INDEX: 19.25 KG/M2 | WEIGHT: 119.8 LBS | HEIGHT: 66 IN | HEART RATE: 99 BPM | SYSTOLIC BLOOD PRESSURE: 104 MMHG | OXYGEN SATURATION: 99 % | TEMPERATURE: 97 F | RESPIRATION RATE: 18 BRPM | DIASTOLIC BLOOD PRESSURE: 66 MMHG

## 2023-06-02 DIAGNOSIS — B34.9 VIRAL SYNDROME: Primary | ICD-10-CM

## 2023-06-02 PROBLEM — K58.0 IRRITABLE BOWEL SYNDROME WITH DIARRHEA: Status: ACTIVE | Noted: 2023-06-02

## 2023-06-02 RX ORDER — NORETHINDRONE ACETATE AND ETHINYL ESTRADIOL AND FERROUS FUMARATE 1.5-30(21)
KIT ORAL
COMMUNITY
Start: 2023-05-08 | End: 2023-06-02 | Stop reason: SDUPTHER

## 2023-06-02 RX ORDER — PREDNISONE 10 MG/1
TABLET ORAL
Qty: 18 TABLET | Refills: 0 | Status: SHIPPED | OUTPATIENT
Start: 2023-06-02

## 2023-06-02 RX ORDER — BENZONATATE 200 MG/1
200 CAPSULE ORAL 3 TIMES DAILY PRN
Qty: 30 CAPSULE | Refills: 0 | Status: SHIPPED | OUTPATIENT
Start: 2023-06-02 | End: 2023-06-09

## 2023-06-02 ASSESSMENT — ENCOUNTER SYMPTOMS
ALLERGIC/IMMUNOLOGIC NEGATIVE: 1
EYES NEGATIVE: 1
GASTROINTESTINAL NEGATIVE: 1
RHINORRHEA: 1
NAUSEA: 0
COUGH: 1
SORE THROAT: 1

## 2023-06-02 NOTE — PATIENT INSTRUCTIONS
Viral syndrome   Generally, I recommend Flonase daily for 14 days along with a potent antihistamine such as Allegra D, Zyrtec D or Claritin D for 14 days. If you have Blood pressure problems you may not be able to tolerate the D version. Other OTC nasal sprays such as saline spray, Vicks or Naso aydin can also be helpful. People with high blood pressure may use Coricidin HBP or Benadryl for sinus   symptoms. Oscillococcinum may be helpful for flu or flu-like symptoms. Supplement with Vit D3 5000 units daily, Vit C 1000 mg daily and Zinc 50 mg daily. Many illnesses are caused by viruses. These conditions usually run their course in 7-14 days. Antibiotics do not help fight viral infections and are not needed at this time. Viral syndromes are treated with symptomatic support. You may take tylenol or ibuprofen for fever or aches and pains. Stay hydrated by taking sips of water or non caffeinated, noncarbonated, and nonalcoholic beverages throughout the day. For sore throat, you may gargle with warm salt water, use lozenges or sprays. Hot tea with honey may also be soothing to the throat. Using a daily antihistamine such as Claritin, Zyrtec or Allegra can help with upper respiratory symptoms. Benadryl can be sedating but is helpful at drying secretions and may be taken at night. For earaches, microwave a wet washcloth for 2 mins then using tongs (do not touch as it may scald you ) place cloth in ceramic cup and hold up to ear. The moist heat can be soothing to the ear. Call if you have a fever greater than 102 F or if symptoms do not improve. Your provider may also send you in prescription medications depending on your symptoms and their severity. Take all medications as directed on package unless specifically told otherwise.

## 2023-06-02 NOTE — PROGRESS NOTES
antihistamine such as Claritin, Zyrtec or Allegra can help with upper respiratory symptoms. Benadryl can be sedating but is helpful at drying secretions and may be taken at night. For earaches, microwave a wet washcloth for 2 mins then using tongs (do not touch as it may scald you ) place cloth in ceramic cup and hold up to ear. The moist heat can be soothing to the ear. Call if you have a fever greater than 102 F or if symptoms do not improve. Your provider may also send you in prescription medications depending on your symptoms and their severity. Take all medications as directed on package unless specifically told otherwise. Electronically signed by ALVERTO Stapleton NP on 6/2/2023 at 2:56 PM    EMR Dragon/transcription disclaimer:  Much of thisencounter note is electronic transcription/translation of spoken language to printed texts. The electronic translation of spoken language may be erroneous, or at times, nonsensical words or phrases may be inadvertentlytranscribed.   Although I have reviewed the note for such errors, some may still exist.

## 2023-06-05 NOTE — PROGRESS NOTES
Progress note      Pt Name: Umang Bledsoe  YOB: 1980  MRN: [id number]    Date of evaluation: 8/8/2023  History Obtained From:  patient, electronic medical record    CHIEF COMPLAINT:    Chief Complaint   Patient presents with    Follow-up     Thrombocytopenia ([address])     Other     Brusing     HISTORY OF PRESENT ILLNESS:    Umang Bledsoe is a 38 y.o.  female who returns today in follow-up for history of bleeding diatheses (work-up has been negative for von Willebrand disease on 9/7/2019 and on 4/6/2023), chronic mild thrombocytopenia and intermittent mild leukopenia without neutropenia. Current recommendation is conservative monitoring, in the future if [facility] experiences any bleeding diatheses will make a referral to the Hemophilia clinic to see ALVERTO Kuhn. She returns today reporting no bleeding although does have some bruising although not excessive. Reports has been experiencing neck and shoulder pain, currently managed by pain management and is expecting an MRI of the neck. Platelet count is stable at 101,000 today, platelet count previously ranging from 127,000-166,000. Today's clinic visit to include physical assessment, review of systems, any lab or radiographic findings that were available and plan of care are documented below. HEMATOLOGY HISTORY:  Diagnosis:  Chronic mild thrombocytopenia  Mild leukopenia without neutropenia  Bleeding diatheses  (work-up has been negative for von Willebrand disease on 9/7/2019 and on 4/6/2023)    Treatment summary:  9/27/2019-  von Willebrand factor results were not consistent with a diagnosis of von Willebrand disease according to the current NHLBI guidelines. Conservative monitoring      [facility] was seen initial hematology consultation on 9/27/2019 for bleeding diatheses with concern for a diagnosis of von Willebrand disease.   [facility] has previously been evaluated by hematologist, Dr. Josephine Ding in March 2015 and indicated that lab

## 2023-06-07 DIAGNOSIS — R51.9 ACUTE NONINTRACTABLE HEADACHE, UNSPECIFIED HEADACHE TYPE: ICD-10-CM

## 2023-06-07 DIAGNOSIS — F41.9 ANXIETY: ICD-10-CM

## 2023-06-07 RX ORDER — CLONAZEPAM 1 MG/1
1 TABLET ORAL 2 TIMES DAILY PRN
Qty: 45 TABLET | Refills: 0 | Status: SHIPPED | OUTPATIENT
Start: 2023-06-07 | End: 2023-07-07

## 2023-06-07 RX ORDER — BUTALBITAL, ACETAMINOPHEN AND CAFFEINE 50; 325; 40 MG/1; MG/1; MG/1
1 TABLET ORAL EVERY 6 HOURS PRN
Qty: 45 TABLET | Refills: 0 | Status: SHIPPED | OUTPATIENT
Start: 2023-06-07

## 2023-06-07 NOTE — TELEPHONE ENCOUNTER
PDMP Monitoring:    Last PDMP Connie Nolan as Reviewed [facility]):  Review User Review Instant Review Result   Nancy Suarez 3/15/2023  5:00 PM Reviewed PDMP [1]     Urine Drug Screenings (1 yr)    No resulted procedures found.        Medication Contract and Consent for Opioid Use Documents Filed        No documents found

## 2023-06-17 DIAGNOSIS — F90.9 ATTENTION DEFICIT HYPERACTIVITY DISORDER (ADHD), UNSPECIFIED ADHD TYPE: ICD-10-CM

## 2023-06-19 RX ORDER — DEXTROAMPHETAMINE SACCHARATE, AMPHETAMINE ASPARTATE MONOHYDRATE, DEXTROAMPHETAMINE SULFATE AND AMPHETAMINE SULFATE 7.5; 7.5; 7.5; 7.5 MG/1; MG/1; MG/1; MG/1
30 CAPSULE, EXTENDED RELEASE ORAL DAILY
Qty: 30 CAPSULE | Refills: 0 | Status: SHIPPED | OUTPATIENT
Start: 2023-06-19 | End: 2023-07-19

## 2023-06-19 NOTE — TELEPHONE ENCOUNTER
Provider needs to review PDMP    PDMP Monitoring:    Last PDMP Everett Fraire as Reviewed [facility]):  Review User Review Instant Review Result   Jose Enrique Talavera 3/15/2023  5:00 PM Reviewed PDMP [1]     Urine Drug Screenings (1 yr)    No resulted procedures found.        Medication Contract and Consent for Opioid Use Documents Filed      No documents found

## 2023-06-21 ENCOUNTER — HOSPITAL ENCOUNTER (OUTPATIENT)
Dept: PAIN MANAGEMENT | Age: 43
Discharge: HOME | End: 2023-06-21
Payer: COMMERCIAL

## 2023-06-21 VITALS
OXYGEN SATURATION: 100 % | RESPIRATION RATE: 18 BRPM | SYSTOLIC BLOOD PRESSURE: 95 MMHG | DIASTOLIC BLOOD PRESSURE: 73 MMHG | HEART RATE: 84 BPM | TEMPERATURE: 97.2 F

## 2023-06-21 PROCEDURE — 20553 NJX 1/MLT TRIGGER POINTS 3/>: CPT

## 2023-06-21 PROCEDURE — 64615 CHEMODENERV MUSC MIGRAINE: CPT

## 2023-06-21 PROCEDURE — 2500000003 HC RX 250 WO HCPCS

## 2023-06-21 PROCEDURE — 6360000002 HC RX W HCPCS

## 2023-06-21 RX ORDER — LIDOCAINE HYDROCHLORIDE 10 MG/ML
10 INJECTION, SOLUTION EPIDURAL; INFILTRATION; INTRACAUDAL; PERINEURAL ONCE
Status: DISCONTINUED | OUTPATIENT
Start: 2023-06-21 | End: 2023-06-23 | Stop reason: HOSPADM

## 2023-06-21 RX ORDER — BUPIVACAINE HYDROCHLORIDE 5 MG/ML
10 INJECTION, SOLUTION EPIDURAL; INTRACAUDAL ONCE
Status: DISCONTINUED | OUTPATIENT
Start: 2023-06-21 | End: 2023-06-23 | Stop reason: HOSPADM

## 2023-06-21 RX ORDER — SODIUM CHLORIDE 9 MG/ML
4.5 INJECTION INTRAVENOUS ONCE
Status: DISCONTINUED | OUTPATIENT
Start: 2023-06-21 | End: 2023-06-23 | Stop reason: HOSPADM

## 2023-06-21 NOTE — PROCEDURES
[address]    Patient Name: Devi Spain    : 1980                    Age: 38 y.o. Sex: female    Date: 2023    Pre-op Diagnosis: Chronic Migraines    Post-op Diagnosis: Chronic Migraines    Procedure: Botox injections x 155 units (45 units wasted)    Performing Procedure: ALVERTO Gonzalez    Patient Vitals for the past 24 hrs:   BP Temp Temp src Pulse Resp SpO2   23 0808 95/73 97.2 °F (36.2 °C) Temporal 84 18 100 %       Previous Injections:  Patient had 10/12 headaches/migraines over the past month. Indications:    Devi Spain has been treated for problems with chronic migraine headaches. The patient was taken through a conservative course of treatment without complete resolution of symptoms. Botox injection therapy was offered and after the risks and benefits of the procedure were explained to the patient, we had agreed to proceed. The patient was taken to the procedure room and placed in the seated position. The following muscles were identified using palpation and standard landmarks. These muscles were marked and prepped with alcohol. A total of 155 units were injected after careful aspiration and the following distribution bilaterally:  10 units in 2 sites, procerus 5 units in one site, frontalis 20 units in 4 sites, temporalis 40 unit in 8 sites, occipitals 30 units in 6 sites, cervical paraspinals 20 units in 4 sites, and trapezius 30 units in 6 sites.     Pre-op Diagnosis: Myofascial Pain/ Muscle Spasms/ Cervicalgia    Post-op Diagnosis: Myofascial Pain/ Muscle Spasms/ Cervicalgia    Procedure: Cervical Trigger Point Injections    Performing Procedure: FADIA Gonzalez - BC, VA-BC    Patient Vitals for the past 24 hrs:   BP Temp Temp src Pulse Resp SpO2   23 0808 95/73 97.2 °F (36.2 °C) Temporal 84 18 100 %       Description of Procedure:    After a brief physical assessment and failure to improve with

## 2023-06-21 NOTE — DISCHARGE INSTRUCTIONS
[address] [place] [address] Physical And Pain Medicine  Post Procedure Discharge Instructions        YOU HAVE HAD THE FOLLOWING PROCEDURE:                                  [] Occipital Nerve Blocks  [] CTS wrist injection(s)  [] Knee Injection(s)         [] Shoulder Injection(s)   [] Elbow Injection(s)     [x] Botox Injection  [x] Cervical Trigger Point Injections    [] Thoracic Trigger Point Injections    [] Lumbar Trigger Point Injections  [] Piriformis Trigger Point Injections  [] SI Joint Injection(s)     [] Trochanteric Bursa Injection(s)       [] Ankle Injection(s)   [] Plantar Fasciitis   []  ______________  Injection(s) [x] Botox [x]  Migraines [] Spasticity    YOU HAVE RECEIVED THE FOLLOWING MEDICATIONS IN YOUR INJECTION(s)  [x] Lidocaine [x] Bupivacaine   [] DepoMedrol (steroid) [] Decadron (steroid)  []  Kenalog (steroid)   [] Toradol  [] Supartz [] Peggy Fritter    [x] Botox        PATIENT INFORMATION:   You may experience the following symptoms after your procedure. These symptoms are normal and should not cause concern: You may have an increase in your pain. This may last 24 - 48 hours after your procedure. You may have no change in the pain that you had prior to your injection(s). You may have weakness or numbness in your affected extremity. If this occurs, this may last until numbing the medication wears off. REPORT THE FOLLOWING SYMPTOMS TO YOUR DOCTOR:  Redness, swelling or drainage at the injection site(s)  Unusual pain that interferes with your normal activities of daily living. OTHER INSTRUCTIONS:    [x] I will apply ice to the injection site(s) for at least 24 hours after the procedure. I will rotate the ice on for 20 minutes and off for 20 minutes for at least 24 hours. [x] I will not apply heat for at least 48 hours and I will not take a hot bath or shower for at least 24 hours.      [x] I understand that if Lidocaine or Bupivacaine was used in my injection(s) that the injection

## 2023-06-21 NOTE — PROGRESS NOTES
Patient states that he/she has __10____ headaches out of 30 days each month.   Patient states that he/she has __12____ migraines out of 30 days each month.monthly

## 2023-06-27 ENCOUNTER — TELEPHONE (OUTPATIENT)
Dept: PAIN MANAGEMENT | Age: 43
End: 2023-06-27

## 2023-06-27 DIAGNOSIS — G43.109 MIGRAINE WITH AURA AND WITHOUT STATUS MIGRAINOSUS, NOT INTRACTABLE: Primary | ICD-10-CM

## 2023-07-05 DIAGNOSIS — F41.9 ANXIETY: ICD-10-CM

## 2023-07-05 RX ORDER — CLONAZEPAM 1 MG/1
1 TABLET ORAL 2 TIMES DAILY PRN
Qty: 45 TABLET | Refills: 0 | Status: SHIPPED | OUTPATIENT
Start: 2023-07-05 | End: 2023-08-04

## 2023-07-05 NOTE — TELEPHONE ENCOUNTER
Provider needs to review PDMP    PDMP Monitoring:    Last PDMP Jamilah Weinstein as Reviewed [facility]):  Review User Review Instant Review Result   Mirella Kasandra 3/15/2023  5:00 PM Reviewed PDMP [1]     Urine Drug Screenings (1 yr)    No resulted procedures found.        Medication Contract and Consent for Opioid Use Documents Filed        No documents found

## 2023-07-15 DIAGNOSIS — F90.9 ATTENTION DEFICIT HYPERACTIVITY DISORDER (ADHD), UNSPECIFIED ADHD TYPE: ICD-10-CM

## 2023-07-15 DIAGNOSIS — R51.9 ACUTE NONINTRACTABLE HEADACHE, UNSPECIFIED HEADACHE TYPE: ICD-10-CM

## 2023-07-17 RX ORDER — DEXTROAMPHETAMINE SACCHARATE, AMPHETAMINE ASPARTATE MONOHYDRATE, DEXTROAMPHETAMINE SULFATE AND AMPHETAMINE SULFATE 7.5; 7.5; 7.5; 7.5 MG/1; MG/1; MG/1; MG/1
30 CAPSULE, EXTENDED RELEASE ORAL DAILY
Qty: 30 CAPSULE | Refills: 0 | Status: SHIPPED | OUTPATIENT
Start: 2023-07-17 | End: 2023-08-16

## 2023-07-17 RX ORDER — BUTALBITAL, ACETAMINOPHEN AND CAFFEINE 50; 325; 40 MG/1; MG/1; MG/1
1 TABLET ORAL EVERY 6 HOURS PRN
Qty: 45 TABLET | Refills: 0 | Status: SHIPPED | OUTPATIENT
Start: 2023-07-17

## 2023-07-17 NOTE — TELEPHONE ENCOUNTER
PDMP Monitoring:    Last PDMP Chase Gibbs as Reviewed [facility]):  Review User Review Instant Review Result   SUKHWINDER SLADE 7/5/2023 12:27 PM Reviewed PDMP [1]     Urine Drug Screenings (1 yr)    No resulted procedures found.        Medication Contract and Consent for Opioid Use Documents Filed      No documents found

## 2023-07-17 NOTE — TELEPHONE ENCOUNTER
PDMP Monitoring:    Last PDMP Zohra Suazo as Reviewed [facility]):  Review User Review Instant Review Result   SUKHWINDER SLADE 7/5/2023 12:27 PM Reviewed PDMP [1]     Urine Drug Screenings (1 yr)    No resulted procedures found.        Medication Contract and Consent for Opioid Use Documents Filed        No documents found

## 2023-07-31 ENCOUNTER — HOSPITAL ENCOUNTER (OUTPATIENT)
Dept: PAIN MANAGEMENT | Age: 43
Discharge: HOME | End: 2023-07-31
Payer: COMMERCIAL

## 2023-07-31 VITALS
RESPIRATION RATE: 16 BRPM | SYSTOLIC BLOOD PRESSURE: 106 MMHG | HEIGHT: 66 IN | OXYGEN SATURATION: 100 % | WEIGHT: 119 LBS | BODY MASS INDEX: 19.13 KG/M2 | HEART RATE: 82 BPM | DIASTOLIC BLOOD PRESSURE: 69 MMHG | TEMPERATURE: 97 F

## 2023-07-31 DIAGNOSIS — M40.40: ICD-10-CM

## 2023-07-31 DIAGNOSIS — M54.2 NECK PAIN: ICD-10-CM

## 2023-07-31 DIAGNOSIS — M62.838 CERVICAL PARASPINAL MUSCLE SPASM: Primary | ICD-10-CM

## 2023-07-31 DIAGNOSIS — M62.838 CERVICAL PARASPINAL MUSCLE SPASM: ICD-10-CM

## 2023-07-31 DIAGNOSIS — M53.3 CHRONIC SACROILIAC JOINT PAIN: ICD-10-CM

## 2023-07-31 DIAGNOSIS — M25.551 BILATERAL HIP PAIN: ICD-10-CM

## 2023-07-31 DIAGNOSIS — M62.838 MUSCLE SPASMS OF NECK: Primary | ICD-10-CM

## 2023-07-31 DIAGNOSIS — R20.0 NUMBNESS AND TINGLING OF BOTH UPPER EXTREMITIES: ICD-10-CM

## 2023-07-31 DIAGNOSIS — G89.29 CHRONIC SACROILIAC JOINT PAIN: ICD-10-CM

## 2023-07-31 DIAGNOSIS — M25.552 BILATERAL HIP PAIN: ICD-10-CM

## 2023-07-31 DIAGNOSIS — M62.838 MUSCLE SPASMS OF NECK: ICD-10-CM

## 2023-07-31 DIAGNOSIS — R20.2 NUMBNESS AND TINGLING OF BOTH UPPER EXTREMITIES: ICD-10-CM

## 2023-07-31 PROCEDURE — 99214 OFFICE O/P EST MOD 30 MIN: CPT

## 2023-07-31 PROCEDURE — 99214 OFFICE O/P EST MOD 30 MIN: CPT | Performed by: NURSE PRACTITIONER

## 2023-07-31 RX ORDER — CYCLOBENZAPRINE HCL 10 MG
10 TABLET ORAL
Qty: 30 TABLET | Refills: 2 | Status: SHIPPED | OUTPATIENT
Start: 2023-07-31 | End: 2023-10-29

## 2023-07-31 RX ORDER — METHOCARBAMOL 500 MG/1
500 TABLET, FILM COATED ORAL 3 TIMES DAILY
COMMUNITY
End: 2023-07-31 | Stop reason: SDUPTHER

## 2023-07-31 RX ORDER — METHOCARBAMOL 750 MG/1
750 TABLET, FILM COATED ORAL 3 TIMES DAILY
Qty: 90 TABLET | Refills: 2 | Status: SHIPPED | OUTPATIENT
Start: 2023-07-31

## 2023-07-31 ASSESSMENT — ENCOUNTER SYMPTOMS
PHOTOPHOBIA: 1
BACK PAIN: 1
VISUAL CHANGE: 1
CONSTIPATION: 0

## 2023-07-31 ASSESSMENT — PAIN SCALES - GENERAL: PAINLEVEL_OUTOF10: 10

## 2023-07-31 ASSESSMENT — PAIN DESCRIPTION - LOCATION: LOCATION: HEAD

## 2023-07-31 NOTE — PROGRESS NOTES
Clinic Documentation      Education Provided:  [x] Review of Tabitha Early  [] Agreement Review  [x] PEG Score Calculated [] PHQ Score Calculated [] ORT Score Calculated    [] Compliance Issues Discussed [] Cognitive Behavior Needs [x] Exercise [] Review of Test [] Financial Issues  [x] Tobacco/Alcohol Use Reviewed [x] Teaching [] New Patient [] Picture Obtained    Physician Plan:  [] Outgoing Referral  [] Pharmacy Consult  [x] Test Ordered [x] Prescription Ordered/Changed   [] Obtained Test Results / Consult Notes        Complete if patient is withholding blood thinner for procedure     Blood Thinner Patient is currently taking:      [] Plavix (Hold for 7 days)  [] Aspirin (Hold for 5 days)     [] Pletal (Hold for 2 days)  [] Pradaxa (Hold for 3 days)    [] Effient (Hold for 7 days)  [] Xarelto (Hold for 2 days)    [] Eliquis (Hold for 2 days)  [] Brilinta (Hold for 7 days)    [] Coumadin (Hold for 5 days) - (INR needs to be drawn the day prior to procedure- INR < 2.0)    [] Aggrenox (Hold for 7 days)        [] Patient will stop medication on their own.    [] Blood Thinner Form Faxed for approval to hold.    Provider form faxed to:     Assessment Completed by:  Electronically signed by Scott Reece RN on 7/31/2023 at 8:36 AM
Activity: discussed exercise as beneficial to pain reduction, encouraged stretching exercise. Goal:  Pain Management Goals of Therapy:   [] Resolution in pain  [x] Decrease in pain level  [x] Improvement in ADL's  [x] Increase in activities with less pain  [] Decrease in medication     Controlled substance monitoring:  Yearly    [] Discussed medication side effects, risk of tolerance and/or dependence, and/or alternative treatment  [] Discussed the detrimental effects of long term narcotic use in younger patients especially women of childbearing years. [] No signs and symptoms of potential drug abuse or diversion were identified  [] Signs of potential drug abuse or diversion were identified   [] ORT Score   [] UDS non-compliant   [] See Note  [] Random urine drug screen sent today  [] Random urine drug screen not completed today   [] Deferred New Patient  [] Compliant   [] Not Compliant see note  [] Medication agreement with provider signed today  [] Medication agreement with provider on file under media   [] Medication regimen effective with no c/o side effects and continued   [] New patient continuing current medication while developing POC   [] On going assessment and evaluation of medication regimen  [] Medication regimen not effective see plan for changes  [] Stanley Dash reviewed & on file under media     CC:  MD Louie Salter, APRN - CNP, 7/31/2023 at 8:19 AM    EMR dragon/transcription disclaimer: Much of this encounter note is electronic transcription/translation of spoken language to printed tach. Electronic translation of spoken language may be erroneous, or at times, nonsensical words or phrases may be inadvertently transcribed.  Although, I have reviewed the note for such errors, some may still exist.

## 2023-08-03 ENCOUNTER — HOSPITAL ENCOUNTER (OUTPATIENT)
Dept: GENERAL RADIOLOGY | Age: 43
Discharge: HOME | End: 2023-08-03
Payer: COMMERCIAL

## 2023-08-03 DIAGNOSIS — M25.552 BILATERAL HIP PAIN: ICD-10-CM

## 2023-08-03 DIAGNOSIS — G89.29 CHRONIC SACROILIAC JOINT PAIN: ICD-10-CM

## 2023-08-03 DIAGNOSIS — M25.551 BILATERAL HIP PAIN: ICD-10-CM

## 2023-08-03 DIAGNOSIS — M53.3 CHRONIC SACROILIAC JOINT PAIN: ICD-10-CM

## 2023-08-03 PROCEDURE — 73521 X-RAY EXAM HIPS BI 2 VIEWS: CPT

## 2023-08-04 DIAGNOSIS — F41.9 ANXIETY: ICD-10-CM

## 2023-08-04 RX ORDER — CLONAZEPAM 1 MG/1
1 TABLET ORAL 2 TIMES DAILY PRN
Qty: 45 TABLET | Refills: 0 | OUTPATIENT
Start: 2023-08-04 | End: 2023-09-03

## 2023-08-04 RX ORDER — NORETHINDRONE ACETATE AND ETHINYL ESTRADIOL 1.5-30(21)
1 KIT ORAL DAILY
Qty: 1 PACKET | Refills: 3 | Status: SHIPPED | OUTPATIENT
Start: 2023-08-04 | End: 2023-09-08 | Stop reason: SDUPTHER

## 2023-08-04 NOTE — TELEPHONE ENCOUNTER
*UDS and contract past due* Sending to PCP for review. PDMP Monitoring:    Last PDMP Kurtis Wesley as Reviewed [facility]):  Review User Review Instant Review Result   SUKHWINDER SLADE 7/5/2023 12:27 PM Reviewed PDMP [1]     Urine Drug Screenings (1 yr)    No resulted procedures found.        Medication Contract and Consent for Opioid Use Documents Filed      No documents found

## 2023-08-07 ENCOUNTER — TELEPHONE (OUTPATIENT)
Dept: PAIN MANAGEMENT | Age: 43
End: 2023-08-07

## 2023-08-08 DIAGNOSIS — D69.6 THROMBOCYTOPENIA (HCC): Primary | ICD-10-CM

## 2023-08-08 NOTE — TELEPHONE ENCOUNTER
Please notify patient that no acute abnormality identified.      Electronically signed by ALVERTO Haley CNP on 8/7/2023 at 11:04 PM

## 2023-08-09 ENCOUNTER — HOSPITAL ENCOUNTER (OUTPATIENT)
Dept: INFUSION THERAPY | Age: 43
Discharge: HOME | End: 2023-08-09
Payer: COMMERCIAL

## 2023-08-09 ENCOUNTER — NURSE ONLY (OUTPATIENT)
Dept: PRIMARY CARE CLINIC | Age: 43
End: 2023-08-09

## 2023-08-09 ENCOUNTER — OFFICE VISIT (OUTPATIENT)
Dept: HEMATOLOGY | Age: 43
End: 2023-08-09
Payer: COMMERCIAL

## 2023-08-09 DIAGNOSIS — D69.9 BLEEDING DISORDER (HCC): ICD-10-CM

## 2023-08-09 DIAGNOSIS — D69.6 THROMBOCYTOPENIA (HCC): Primary | ICD-10-CM

## 2023-08-09 DIAGNOSIS — D69.6 THROMBOCYTOPENIA (HCC): ICD-10-CM

## 2023-08-09 DIAGNOSIS — F41.9 ANXIETY: ICD-10-CM

## 2023-08-09 DIAGNOSIS — Z79.899 MEDICATION MANAGEMENT: Primary | ICD-10-CM

## 2023-08-09 DIAGNOSIS — D72.819 LEUKOPENIA, UNSPECIFIED TYPE: ICD-10-CM

## 2023-08-09 LAB
ALBUMIN SERPL-MCNC: 4.3 G/DL (ref 3.5–5.2)
ALCOHOL URINE: NORMAL
ALP SERPL-CCNC: 45 U/L (ref 35–104)
ALT SERPL-CCNC: 9 U/L (ref 5–33)
AMPHETAMINE SCREEN, URINE: POSITIVE
ANION GAP SERPL CALCULATED.3IONS-SCNC: 8 MMOL/L (ref 7–19)
AST SERPL-CCNC: 10 U/L (ref 5–32)
BARBITURATE SCREEN, URINE: NORMAL
BASOPHILS # BLD: 0.02 K/UL (ref 0.01–0.08)
BASOPHILS NFR BLD: 0.4 % (ref 0.1–1.2)
BENZODIAZEPINE SCREEN, URINE: NORMAL
BILIRUB SERPL-MCNC: <0.2 MG/DL (ref 0.2–1.2)
BUN SERPL-MCNC: 17 MG/DL (ref 6–20)
BUPRENORPHINE URINE: NORMAL
CALCIUM SERPL-MCNC: 8.7 MG/DL (ref 8.6–10)
CHLORIDE SERPL-SCNC: 109 MMOL/L (ref 98–111)
CO2 SERPL-SCNC: 22 MMOL/L (ref 22–29)
COCAINE METABOLITE SCREEN URINE: NORMAL
CREAT SERPL-MCNC: 0.8 MG/DL (ref 0.5–0.9)
EOSINOPHIL # BLD: 0.01 K/UL (ref 0.04–0.54)
EOSINOPHIL NFR BLD: 0.2 % (ref 0.7–7)
ERYTHROCYTE [DISTWIDTH] IN BLOOD BY AUTOMATED COUNT: 12.2 % (ref 11.7–14.4)
FENTANYL SCREEN, URINE: NORMAL
GABAPENTIN SCREEN, URINE: NORMAL
GLUCOSE SERPL-MCNC: 97 MG/DL (ref 74–109)
HCT VFR BLD AUTO: 42 % (ref 34.1–44.9)
HGB BLD-MCNC: 13.8 G/DL (ref 11.2–15.7)
LYMPHOCYTES # BLD: 1.3 K/UL (ref 1.18–3.74)
LYMPHOCYTES NFR BLD: 28.4 % (ref 19.3–53.1)
MCH RBC QN AUTO: 31.4 PG (ref 25.6–32.2)
MCHC RBC AUTO-ENTMCNC: 32.9 G/DL (ref 32.3–35.5)
MCV RBC AUTO: 95.7 FL (ref 79.4–94.8)
MDMA URINE: NORMAL
METHADONE SCREEN, URINE: NORMAL
METHAMPHETAMINE, URINE: NORMAL
MONOCYTES # BLD: 0.35 K/UL (ref 0.24–0.82)
MONOCYTES NFR BLD: 7.7 % (ref 4.7–12.5)
NEUTROPHILS # BLD: 2.88 K/UL (ref 1.56–6.13)
NEUTS SEG NFR BLD: 63.1 % (ref 34–71.1)
OPIATE SCREEN URINE: NORMAL
OXYCODONE SCREEN URINE: NORMAL
PHENCYCLIDINE SCREEN URINE: NORMAL
PLATELET # BLD AUTO: 145 K/UL (ref 182–369)
PMV BLD AUTO: 9.7 FL (ref 7.4–10.4)
POTASSIUM SERPL-SCNC: 4.2 MMOL/L (ref 3.5–5)
PROPOXYPHENE SCREEN, URINE: NORMAL
PROT SERPL-MCNC: 6.5 G/DL (ref 6.6–8.7)
RBC # BLD AUTO: 4.39 M/UL (ref 3.93–5.22)
SODIUM SERPL-SCNC: 139 MMOL/L (ref 136–145)
SYNTHETIC CANNABINOIDS(K2) SCREEN, URINE: NORMAL
THC SCREEN, URINE: NORMAL
TRAMADOL SCREEN URINE: NORMAL
TRICYCLIC ANTIDEPRESSANTS, UR: NORMAL
WBC # BLD AUTO: 4.57 K/UL (ref 3.98–10.04)

## 2023-08-09 PROCEDURE — 99213 OFFICE O/P EST LOW 20 MIN: CPT | Performed by: NURSE PRACTITIONER

## 2023-08-09 PROCEDURE — 99212 OFFICE O/P EST SF 10 MIN: CPT

## 2023-08-09 PROCEDURE — 85025 COMPLETE CBC W/AUTO DIFF WBC: CPT

## 2023-08-09 RX ORDER — CLONAZEPAM 1 MG/1
1 TABLET ORAL 2 TIMES DAILY PRN
Qty: 45 TABLET | Refills: 0 | Status: SHIPPED | OUTPATIENT
Start: 2023-08-09 | End: 2023-09-08

## 2023-08-09 NOTE — TELEPHONE ENCOUNTER
PDMP Monitoring:    Last PDMP [facility] as Reviewed [facility]):  Review User Review Instant Review Result   SUKHWINDER Marcelino 7/5/2023 12:27 PM Reviewed PDMP [1]     Urine Drug Screenings (1 yr)       POCT Rapid Drug Screen  Collected: 8/9/2023  2:08 PM (Final result)                  Medication Contract and Consent for Opioid Use Documents Filed        No documents found

## 2023-08-15 ASSESSMENT — ENCOUNTER SYMPTOMS
COUGH: 0
SORE THROAT: 0
EYE REDNESS: 0
VOMITING: 0
DIARRHEA: 0
WHEEZING: 0
CONSTIPATION: 0
SHORTNESS OF BREATH: 0
EYES NEGATIVE: 1
EYE PAIN: 0
NAUSEA: 0
ABDOMINAL PAIN: 0
EYE DISCHARGE: 0
BLOOD IN STOOL: 0
BACK PAIN: 0
RESPIRATORY NEGATIVE: 1
GASTROINTESTINAL NEGATIVE: 1

## 2023-08-16 ENCOUNTER — HOSPITAL ENCOUNTER (OUTPATIENT)
Dept: ULTRASOUND IMAGING | Age: 43
Discharge: HOME | End: 2023-08-16
Payer: COMMERCIAL

## 2023-08-16 DIAGNOSIS — F90.9 ATTENTION DEFICIT HYPERACTIVITY DISORDER (ADHD), UNSPECIFIED ADHD TYPE: ICD-10-CM

## 2023-08-16 DIAGNOSIS — R51.9 ACUTE NONINTRACTABLE HEADACHE, UNSPECIFIED HEADACHE TYPE: ICD-10-CM

## 2023-08-16 DIAGNOSIS — D69.6 THROMBOCYTOPENIA (HCC): ICD-10-CM

## 2023-08-16 PROCEDURE — 76700 US EXAM ABDOM COMPLETE: CPT

## 2023-08-16 RX ORDER — BUTALBITAL, ACETAMINOPHEN AND CAFFEINE 50; 325; 40 MG/1; MG/1; MG/1
1 TABLET ORAL EVERY 6 HOURS PRN
Qty: 45 TABLET | Refills: 0 | Status: SHIPPED | OUTPATIENT
Start: 2023-08-16

## 2023-08-16 RX ORDER — DEXTROAMPHETAMINE SACCHARATE, AMPHETAMINE ASPARTATE MONOHYDRATE, DEXTROAMPHETAMINE SULFATE AND AMPHETAMINE SULFATE 7.5; 7.5; 7.5; 7.5 MG/1; MG/1; MG/1; MG/1
30 CAPSULE, EXTENDED RELEASE ORAL DAILY
Qty: 30 CAPSULE | Refills: 0 | Status: SHIPPED | OUTPATIENT
Start: 2023-08-16 | End: 2023-09-15

## 2023-08-16 NOTE — TELEPHONE ENCOUNTER
PDMP Monitoring:    Last PDMP Gonzalez Meade as Reviewed [facility]):  Review User Review Instant Review Result   SUKHWINDER Delgado 7/5/2023 12:27 PM Reviewed PDMP [1]     Urine Drug Screenings (1 yr)     POCT Rapid Drug Screen  Collected: 8/9/2023  2:08 PM (Final result)              Medication Contract and Consent for Opioid Use Documents Filed      No documents found

## 2023-09-08 DIAGNOSIS — R51.9 ACUTE NONINTRACTABLE HEADACHE, UNSPECIFIED HEADACHE TYPE: ICD-10-CM

## 2023-09-08 DIAGNOSIS — F41.9 ANXIETY: ICD-10-CM

## 2023-09-08 RX ORDER — BUTALBITAL, ACETAMINOPHEN AND CAFFEINE 50; 325; 40 MG/1; MG/1; MG/1
1 TABLET ORAL EVERY 6 HOURS PRN
Qty: 45 TABLET | Refills: 0 | Status: SHIPPED | OUTPATIENT
Start: 2023-09-08 | End: 2023-10-16 | Stop reason: SDUPTHER

## 2023-09-08 RX ORDER — CLONAZEPAM 1 MG/1
1 TABLET ORAL 2 TIMES DAILY PRN
Qty: 45 TABLET | Refills: 0 | Status: SHIPPED | OUTPATIENT
Start: 2023-09-08 | End: 2023-10-12 | Stop reason: SDUPTHER

## 2023-09-08 NOTE — TELEPHONE ENCOUNTER
PDMP Monitoring:    Last PDMP Jose Points as Reviewed [facility]):  Review User Review Instant Review Result   SUKHWINDER Brothers 7/5/2023 12:27 PM Reviewed PDMP [1]     Urine Drug Screenings (1 yr)       POCT Rapid Drug Screen  Collected: 8/9/2023  2:08 PM (Final result)                  Medication Contract and Consent for Opioid Use Documents Filed        No documents found

## 2023-09-11 RX ORDER — NORETHINDRONE ACETATE AND ETHINYL ESTRADIOL 1.5-30(21)
1 KIT ORAL DAILY
Qty: 1 PACKET | Refills: 3 | Status: SHIPPED | OUTPATIENT
Start: 2023-09-11

## 2023-09-17 DIAGNOSIS — F90.9 ATTENTION DEFICIT HYPERACTIVITY DISORDER (ADHD), UNSPECIFIED ADHD TYPE: ICD-10-CM

## 2023-09-18 RX ORDER — DEXTROAMPHETAMINE SACCHARATE, AMPHETAMINE ASPARTATE MONOHYDRATE, DEXTROAMPHETAMINE SULFATE AND AMPHETAMINE SULFATE 7.5; 7.5; 7.5; 7.5 MG/1; MG/1; MG/1; MG/1
30 CAPSULE, EXTENDED RELEASE ORAL DAILY
Qty: 30 CAPSULE | Refills: 0 | Status: SHIPPED | OUTPATIENT
Start: 2023-09-18 | End: 2023-10-18

## 2023-09-18 NOTE — TELEPHONE ENCOUNTER
PDMP Monitoring:    Last PDMP Daniel Deiters as Reviewed [facility]):  Review User Review Instant Review Result   Cindy Palacio 9/8/2023  4:52 PM Reviewed PDMP [1]     Urine Drug Screenings (1 yr)     POCT Rapid Drug Screen  Collected: 8/9/2023  2:08 PM (Final result)              Medication Contract and Consent for Opioid Use Documents Filed      No documents found

## 2023-09-20 ENCOUNTER — HOSPITAL ENCOUNTER (OUTPATIENT)
Dept: PAIN MANAGEMENT | Age: 43
Discharge: HOME | End: 2023-09-20
Payer: COMMERCIAL

## 2023-09-20 VITALS
TEMPERATURE: 97.4 F | OXYGEN SATURATION: 100 % | RESPIRATION RATE: 16 BRPM | SYSTOLIC BLOOD PRESSURE: 109 MMHG | DIASTOLIC BLOOD PRESSURE: 76 MMHG | HEART RATE: 104 BPM

## 2023-09-20 DIAGNOSIS — G43.109 MIGRAINE WITH AURA AND WITHOUT STATUS MIGRAINOSUS, NOT INTRACTABLE: Primary | ICD-10-CM

## 2023-09-20 PROCEDURE — 2500000003 HC RX 250 WO HCPCS

## 2023-09-20 PROCEDURE — 2580000003 HC RX 258

## 2023-09-20 PROCEDURE — 20553 NJX 1/MLT TRIGGER POINTS 3/>: CPT | Performed by: NURSE PRACTITIONER

## 2023-09-20 PROCEDURE — 64615 CHEMODENERV MUSC MIGRAINE: CPT | Performed by: NURSE PRACTITIONER

## 2023-09-20 PROCEDURE — A4216 STERILE WATER/SALINE, 10 ML: HCPCS

## 2023-09-20 PROCEDURE — 64615 CHEMODENERV MUSC MIGRAINE: CPT

## 2023-09-20 PROCEDURE — 6360000002 HC RX W HCPCS

## 2023-09-20 PROCEDURE — 20553 NJX 1/MLT TRIGGER POINTS 3/>: CPT

## 2023-09-20 RX ORDER — LIDOCAINE HYDROCHLORIDE 10 MG/ML
10 INJECTION, SOLUTION EPIDURAL; INFILTRATION; INTRACAUDAL; PERINEURAL ONCE
Status: DISCONTINUED | OUTPATIENT
Start: 2023-09-20 | End: 2023-09-22 | Stop reason: HOSPADM

## 2023-09-20 RX ORDER — BUPIVACAINE HYDROCHLORIDE 5 MG/ML
10 INJECTION, SOLUTION EPIDURAL; INTRACAUDAL ONCE
Status: DISCONTINUED | OUTPATIENT
Start: 2023-09-20 | End: 2023-09-22 | Stop reason: HOSPADM

## 2023-09-20 RX ORDER — SODIUM CHLORIDE 9 MG/ML
4.5 INJECTION INTRAVENOUS ONCE
Status: DISCONTINUED | OUTPATIENT
Start: 2023-09-20 | End: 2023-09-22 | Stop reason: HOSPADM

## 2023-09-20 NOTE — PROCEDURES
improve with conservative measures the patient presented for Cervical Trigger Point Injections The indications, limitations and possible complications were discussed with the patient and the patient elected to proceed with the procedure. After voluntary, informed and signed consent obtained the patient was placed in a seated position. Appropriate time out was obtained per policy. The area of maximal tenderness was palpated over the   bilaterally Cervical muscles - Splenius  Trapezius  Rhomboid The skin overlying these areas was marked with a skin marker. The skin overlying the proposed injection sites were then sprayed with Gebauer's Solution while protecting patient eyes. The areas were prepped using aseptic technique with CHG prep. Each trigger point of the Cervical muscles - Splenius  Trapezius  Rhomboid was injected with approximately 1-2 ml of a solution of 5 ml of 1% Lidocaine Plain and 5 ml of 0.5% Marcaine Plain after negative aspiration    Discharge: The patient tolerated the procedure well. There were no complications during the procedure and the patient was discharged home with discharge instructions. The patient has been instructed to contact the office should there be any complications or questions to arise between today and their next appointment. Plan:    Patient to follow up in the office in about 6 weeks.      ALVERTO Vázquez - CNP, 9/20/2023 at 7:03 PM

## 2023-09-20 NOTE — DISCHARGE INSTRUCTIONS
[address] [facility]  Post Procedure Discharge Instructions        YOU HAVE HAD THE FOLLOWING PROCEDURE:                                  [] Occipital Nerve Blocks  [] CTS wrist injection(s)  [] Knee Injection(s)         [] Shoulder Injection(s)   [] Elbow Injection(s)     [x] Botox Injection  [x] Cervical Trigger Point Injections    [x] Thoracic Trigger Point Injections    [] Lumbar Trigger Point Injections  [] Piriformis Trigger Point Injections  [] SI Joint Injection(s)     [] Trochanteric Bursa Injection(s)       [] Ankle Injection(s)   [] Plantar Fasciitis   []  ______________  Injection(s) [x] Botox [x]  Migraines [] Spasticity    YOU HAVE RECEIVED THE FOLLOWING MEDICATIONS IN YOUR INJECTION(s)  [x] Lidocaine [x] Bupivacaine   [] DepoMedrol (steroid) [] Decadron (steroid)  []  Kenalog (steroid)   [] Toradol  [] Supartz [] Gonzalez Chely    [x] Botox        PATIENT INFORMATION:   You may experience the following symptoms after your procedure. These symptoms are normal and should not cause concern: You may have an increase in your pain. This may last 24 - 48 hours after your procedure. You may have no change in the pain that you had prior to your injection(s). You may have weakness or numbness in your affected extremity. If this occurs, this may last until numbing the medication wears off. REPORT THE FOLLOWING SYMPTOMS TO YOUR DOCTOR:  Redness, swelling or drainage at the injection site(s)  Unusual pain that interferes with your normal activities of daily living. OTHER INSTRUCTIONS:    [x] I will apply ice to the injection site(s) for at least 24 hours after the procedure. I will rotate the ice on for 20 minutes and off for 20 minutes for at least 24 hours. [x] I will not apply heat for at least 48 hours and I will not take a hot bath or shower for at least 24 hours.      [x] I understand that if Lidocaine or Bupivacaine was used in my injection(s) that the injection

## 2023-09-20 NOTE — PROGRESS NOTES
[facility]   Juana solomon  [phone]    Procedure:  Level of Consciousness: [x]Alert [x]Oriented []Disoriented []Lethargic  Anxiety Level: [x]Calm []Anxious []Depressed []Other  Skin: []Warm [x]Dry []Cool []Moist []Intact []Other  Cardiovascular: [x]Palpitations: [x]Never []Occasionally []Frequently  Chest Pain: [x]No []Yes  Respiratory:  [x]Unlabored []Labored []Cough ([] Productive []Unproductive)  HCG Required: [x]No []Yes   Results: []Negative []Positive  Knowledge Level:        [x]Patient/Other verbalized understanding of pre-procedure instructions. [x]Assessment of post-op care needs (transportation, responsible caregiver)        [x]Able to discuss health care problems and how to deal with it. Factors that Affect Teaching:        Language Barrier: [x]No []Yes - why:        Hearing Loss:        [x]No []Yes            Corrective Device:  []Yes []No        Vision Loss:           [x]No []Yes            Corrective Device:  []Yes []No        Memory Loss:       [x]No []Yes            []Short Term []Long Term  Motivational Level:  [x]Asks Questions                  []Extremely Anxious       [x]Seems Interested               []Seems Uninterested                  [x]Denies need for Education  Risk for Injury:  [x]Patient oriented to person, place and time  []History of frequent falls/loss of balance  Nutritional:  []Change in appetite   []Weight Gain   []Weight Loss  Functional:  []Requires assistance with ADL's      Migraine  Follow up Assessment:  Patient experiences 30 headaches per month  Patient states that he/she has 22 headaches out of 30 days each month. Patient states that he/she has 8 migraines out of 30 days each month.   Patient has experienced a  reduction in Migraine headaches less than 15 days per month [x]Yes []No  Patient has experienced a reduction in Migraine hours [x]Yes []No

## 2023-09-25 ENCOUNTER — OFFICE VISIT (OUTPATIENT)
Dept: PRIMARY CARE CLINIC | Age: 43
End: 2023-09-25
Payer: COMMERCIAL

## 2023-09-25 VITALS
RESPIRATION RATE: 18 BRPM | OXYGEN SATURATION: 100 % | WEIGHT: 119 LBS | DIASTOLIC BLOOD PRESSURE: 70 MMHG | BODY MASS INDEX: 19.13 KG/M2 | HEIGHT: 66 IN | TEMPERATURE: 97.2 F | SYSTOLIC BLOOD PRESSURE: 120 MMHG | HEART RATE: 95 BPM

## 2023-09-25 DIAGNOSIS — M25.511 CHRONIC RIGHT SHOULDER PAIN: Primary | ICD-10-CM

## 2023-09-25 DIAGNOSIS — F41.9 ANXIETY: ICD-10-CM

## 2023-09-25 DIAGNOSIS — D69.6 THROMBOCYTOPENIA (HCC): ICD-10-CM

## 2023-09-25 DIAGNOSIS — F90.9 ATTENTION DEFICIT HYPERACTIVITY DISORDER (ADHD), UNSPECIFIED ADHD TYPE: ICD-10-CM

## 2023-09-25 DIAGNOSIS — R51.9 ACUTE NONINTRACTABLE HEADACHE, UNSPECIFIED HEADACHE TYPE: ICD-10-CM

## 2023-09-25 DIAGNOSIS — G89.29 CHRONIC RIGHT SHOULDER PAIN: Primary | ICD-10-CM

## 2023-09-25 LAB
BASOPHILS # BLD: 0 K/UL (ref 0–0.2)
BASOPHILS NFR BLD: 0.4 % (ref 0–1)
EOSINOPHIL # BLD: 0 K/UL (ref 0–0.6)
EOSINOPHIL NFR BLD: 0.4 % (ref 0–5)
ERYTHROCYTE [DISTWIDTH] IN BLOOD BY AUTOMATED COUNT: 12.1 % (ref 11.5–14.5)
HCT VFR BLD AUTO: 39.7 % (ref 37–47)
HGB BLD-MCNC: 13 G/DL (ref 12–16)
IMM GRANULOCYTES # BLD: 0 K/UL
LYMPHOCYTES # BLD: 1.8 K/UL (ref 1.1–4.5)
LYMPHOCYTES NFR BLD: 32.8 % (ref 20–40)
MCH RBC QN AUTO: 32 PG (ref 27–31)
MCHC RBC AUTO-ENTMCNC: 32.7 G/DL (ref 33–37)
MCV RBC AUTO: 97.8 FL (ref 81–99)
MONOCYTES # BLD: 0.3 K/UL (ref 0–0.9)
MONOCYTES NFR BLD: 5.3 % (ref 0–10)
NEUTROPHILS # BLD: 3.4 K/UL (ref 1.5–7.5)
NEUTS SEG NFR BLD: 60.9 % (ref 50–65)
PLATELET # BLD AUTO: 141 K/UL (ref 130–400)
PMV BLD AUTO: 10.2 FL (ref 9.4–12.3)
RBC # BLD AUTO: 4.06 M/UL (ref 4.2–5.4)
WBC # BLD AUTO: 5.6 K/UL (ref 4.8–10.8)

## 2023-09-25 PROCEDURE — 36415 COLL VENOUS BLD VENIPUNCTURE: CPT | Performed by: NURSE PRACTITIONER

## 2023-09-25 PROCEDURE — 99212 OFFICE O/P EST SF 10 MIN: CPT | Performed by: FAMILY MEDICINE

## 2023-09-25 ASSESSMENT — ENCOUNTER SYMPTOMS
ABDOMINAL PAIN: 0
VOMITING: 0
COLOR CHANGE: 0
EYE DISCHARGE: 0
DIARRHEA: 0
BACK PAIN: 0
COUGH: 0
WHEEZING: 0
NAUSEA: 0

## 2023-09-26 ENCOUNTER — TELEPHONE (OUTPATIENT)
Dept: PAIN MANAGEMENT | Age: 43
End: 2023-09-26

## 2023-09-26 DIAGNOSIS — G43.109 MIGRAINE WITH AURA AND WITHOUT STATUS MIGRAINOSUS, NOT INTRACTABLE: Primary | ICD-10-CM

## 2023-10-12 DIAGNOSIS — F41.9 ANXIETY: ICD-10-CM

## 2023-10-12 RX ORDER — CLONAZEPAM 1 MG/1
1 TABLET ORAL 2 TIMES DAILY PRN
Qty: 45 TABLET | Refills: 0 | Status: SHIPPED | OUTPATIENT
Start: 2023-10-12 | End: 2023-11-11

## 2023-10-12 NOTE — TELEPHONE ENCOUNTER
PDMP Monitoring:    Last PDMP Rosas Sees as Reviewed [facility]):  Review User Review Instant Review Result   Bowen [place] 9/8/2023  4:52 PM Reviewed PDMP [1]     Urine Drug Screenings (1 yr)       POCT Rapid Drug Screen  Collected: 8/9/2023  2:08 PM (Final result)                  Medication Contract and Consent for Opioid Use Documents Filed        No documents found

## 2023-10-16 DIAGNOSIS — F90.9 ATTENTION DEFICIT HYPERACTIVITY DISORDER (ADHD), UNSPECIFIED ADHD TYPE: ICD-10-CM

## 2023-10-16 DIAGNOSIS — R51.9 ACUTE NONINTRACTABLE HEADACHE, UNSPECIFIED HEADACHE TYPE: ICD-10-CM

## 2023-10-16 RX ORDER — DEXTROAMPHETAMINE SACCHARATE, AMPHETAMINE ASPARTATE MONOHYDRATE, DEXTROAMPHETAMINE SULFATE AND AMPHETAMINE SULFATE 7.5; 7.5; 7.5; 7.5 MG/1; MG/1; MG/1; MG/1
30 CAPSULE, EXTENDED RELEASE ORAL DAILY
Qty: 30 CAPSULE | Refills: 0 | Status: SHIPPED | OUTPATIENT
Start: 2023-10-16 | End: 2023-11-15

## 2023-10-16 RX ORDER — BUTALBITAL, ACETAMINOPHEN AND CAFFEINE 50; 325; 40 MG/1; MG/1; MG/1
1 TABLET ORAL EVERY 6 HOURS PRN
Qty: 45 TABLET | Refills: 0 | Status: SHIPPED | OUTPATIENT
Start: 2023-10-16

## 2023-10-16 NOTE — TELEPHONE ENCOUNTER
PDMP Monitoring:    Last PDMP Nola Hills as Reviewed [facility]):  Review User Review Instant Review Result   Betty Lj 9/8/2023  4:52 PM Reviewed PDMP [1]     Urine Drug Screenings (1 yr)     POCT Rapid Drug Screen  Collected: 8/9/2023  2:08 PM (Final result)              Medication Contract and Consent for Opioid Use Documents Filed      No documents found

## 2023-10-16 NOTE — TELEPHONE ENCOUNTER
PDMP Monitoring:    Last PDMP Kyle Hitchcock as Reviewed [facility]):  Review User Review Instant Review Result   Mehnaz Rick 9/8/2023  4:52 PM Reviewed PDMP [1]     Urine Drug Screenings (1 yr)     POCT Rapid Drug Screen  Collected: 8/9/2023  2:08 PM (Final result)              Medication Contract and Consent for Opioid Use Documents Filed      No documents found

## 2023-11-01 ENCOUNTER — HOSPITAL ENCOUNTER (OUTPATIENT)
Dept: MRI IMAGING | Age: 43
Discharge: HOME | End: 2023-11-01
Payer: COMMERCIAL

## 2023-11-01 ENCOUNTER — HOSPITAL ENCOUNTER (OUTPATIENT)
Dept: MRI IMAGING | Age: 43
Discharge: HOME | End: 2023-10-18

## 2023-11-01 DIAGNOSIS — R20.2 NUMBNESS AND TINGLING OF BOTH UPPER EXTREMITIES: ICD-10-CM

## 2023-11-01 DIAGNOSIS — R20.0 NUMBNESS AND TINGLING OF BOTH UPPER EXTREMITIES: ICD-10-CM

## 2023-11-01 DIAGNOSIS — M62.838 CERVICAL PARASPINAL MUSCLE SPASM: ICD-10-CM

## 2023-11-01 DIAGNOSIS — M62.838 MUSCLE SPASMS OF NECK: ICD-10-CM

## 2023-11-01 DIAGNOSIS — M54.2 NECK PAIN: ICD-10-CM

## 2023-11-01 DIAGNOSIS — M40.40: ICD-10-CM

## 2023-11-01 PROCEDURE — 72141 MRI NECK SPINE W/O DYE: CPT

## 2023-11-12 DIAGNOSIS — F41.9 ANXIETY: ICD-10-CM

## 2023-11-13 ENCOUNTER — TELEPHONE (OUTPATIENT)
Dept: PAIN MANAGEMENT | Age: 43
End: 2023-11-13

## 2023-11-13 RX ORDER — CLONAZEPAM 1 MG/1
1 TABLET ORAL 2 TIMES DAILY PRN
Qty: 45 TABLET | Refills: 0 | Status: SHIPPED | OUTPATIENT
Start: 2023-11-13 | End: 2023-12-13

## 2023-11-13 NOTE — TELEPHONE ENCOUNTER
PDMP Monitoring:    Last PDMP Jarrell Freeman as Reviewed [facility]):  Review User Review Instant Review Result   Gricelda Mejia 9/8/2023  4:52 PM Reviewed PDMP [1]     Urine Drug Screenings (1 yr)     POCT Rapid Drug Screen  Collected: 8/9/2023  2:08 PM (Final result)              Medication Contract and Consent for Opioid Use Documents Filed      No documents found

## 2023-11-13 NOTE — TELEPHONE ENCOUNTER
Call placed to patient to inform her of MRI results. Patient did not answer the phone. Left voicemail for patient and told her I would try to call her again tomorrow with results.

## 2023-11-14 ENCOUNTER — TELEPHONE (OUTPATIENT)
Dept: PAIN MANAGEMENT | Age: 43
End: 2023-11-14

## 2023-11-14 NOTE — TELEPHONE ENCOUNTER
Call placed to patient to inform her of MRI of cervical spine results and recommendations. Informed patient a YAQUELIN of C5/C6 was recommended. Patient requested an office visit to discuss with provider. Office appointment made for patient on 11/16/23 at 1320.

## 2023-11-15 DIAGNOSIS — R51.9 ACUTE NONINTRACTABLE HEADACHE, UNSPECIFIED HEADACHE TYPE: ICD-10-CM

## 2023-11-15 DIAGNOSIS — F90.9 ATTENTION DEFICIT HYPERACTIVITY DISORDER (ADHD), UNSPECIFIED ADHD TYPE: ICD-10-CM

## 2023-11-15 RX ORDER — BUTALBITAL, ACETAMINOPHEN AND CAFFEINE 50; 325; 40 MG/1; MG/1; MG/1
1 TABLET ORAL EVERY 6 HOURS PRN
Qty: 45 TABLET | Refills: 0 | Status: SHIPPED | OUTPATIENT
Start: 2023-11-15

## 2023-11-15 RX ORDER — DEXTROAMPHETAMINE SACCHARATE, AMPHETAMINE ASPARTATE MONOHYDRATE, DEXTROAMPHETAMINE SULFATE AND AMPHETAMINE SULFATE 7.5; 7.5; 7.5; 7.5 MG/1; MG/1; MG/1; MG/1
30 CAPSULE, EXTENDED RELEASE ORAL DAILY
Qty: 30 CAPSULE | Refills: 0 | Status: SHIPPED | OUTPATIENT
Start: 2023-11-15 | End: 2023-12-15

## 2023-11-15 NOTE — TELEPHONE ENCOUNTER
PDMP Monitoring:    Last PDMP Issac Villela as Reviewed [facility]):  Review User Review Instant Review Result   Stas Rowe 9/8/2023  4:52 PM Reviewed PDMP [1]     Urine Drug Screenings (1 yr)     POCT Rapid Drug Screen  Collected: 8/9/2023  2:08 PM (Final result)              Medication Contract and Consent for Opioid Use Documents Filed      No documents found

## 2023-11-16 ENCOUNTER — HOSPITAL ENCOUNTER (OUTPATIENT)
Dept: PAIN MANAGEMENT | Age: 43
Discharge: HOME | End: 2023-11-16
Payer: COMMERCIAL

## 2023-11-16 VITALS
HEIGHT: 66 IN | BODY MASS INDEX: 19.44 KG/M2 | DIASTOLIC BLOOD PRESSURE: 77 MMHG | HEART RATE: 97 BPM | OXYGEN SATURATION: 100 % | RESPIRATION RATE: 16 BRPM | TEMPERATURE: 98.1 F | SYSTOLIC BLOOD PRESSURE: 111 MMHG | WEIGHT: 121 LBS

## 2023-11-16 DIAGNOSIS — D69.6 THROMBOCYTOPENIA (HCC): ICD-10-CM

## 2023-11-16 LAB
BASOPHILS # BLD: 0 K/UL (ref 0–0.2)
BASOPHILS NFR BLD: 0.3 % (ref 0–1)
EOSINOPHIL # BLD: 0 K/UL (ref 0–0.6)
EOSINOPHIL NFR BLD: 0.1 % (ref 0–5)
ERYTHROCYTE [DISTWIDTH] IN BLOOD BY AUTOMATED COUNT: 12.3 % (ref 11.5–14.5)
HCT VFR BLD AUTO: 40.1 % (ref 37–47)
HGB BLD-MCNC: 13.1 G/DL (ref 12–16)
IMM GRANULOCYTES # BLD: 0 K/UL
LYMPHOCYTES # BLD: 1.5 K/UL (ref 1.1–4.5)
LYMPHOCYTES NFR BLD: 21.5 % (ref 20–40)
MCH RBC QN AUTO: 31.3 PG (ref 27–31)
MCHC RBC AUTO-ENTMCNC: 32.7 G/DL (ref 33–37)
MCV RBC AUTO: 95.9 FL (ref 81–99)
MONOCYTES # BLD: 0.3 K/UL (ref 0–0.9)
MONOCYTES NFR BLD: 4.7 % (ref 0–10)
NEUTROPHILS # BLD: 5 K/UL (ref 1.5–7.5)
NEUTS SEG NFR BLD: 73 % (ref 50–65)
PLATELET # BLD AUTO: 137 K/UL (ref 130–400)
PMV BLD AUTO: 10 FL (ref 9.4–12.3)
RBC # BLD AUTO: 4.18 M/UL (ref 4.2–5.4)
WBC # BLD AUTO: 6.8 K/UL (ref 4.8–10.8)

## 2023-11-16 PROCEDURE — 99215 OFFICE O/P EST HI 40 MIN: CPT

## 2023-11-16 PROCEDURE — 36415 COLL VENOUS BLD VENIPUNCTURE: CPT | Performed by: NURSE PRACTITIONER

## 2023-11-16 RX ORDER — CYCLOBENZAPRINE HCL 10 MG
1 TABLET ORAL DAILY
COMMUNITY
Start: 2023-11-06

## 2023-11-16 ASSESSMENT — ENCOUNTER SYMPTOMS
VISUAL CHANGE: 1
PHOTOPHOBIA: 1
BACK PAIN: 1
CONSTIPATION: 0

## 2023-11-16 ASSESSMENT — PAIN DESCRIPTION - INTENSITY
RATING_3: 7
RATING_2: 8

## 2023-11-16 ASSESSMENT — PAIN DESCRIPTION - ORIENTATION
ORIENTATION: LOWER
ORIENTATION_2: LOWER

## 2023-11-16 ASSESSMENT — PAIN DESCRIPTION - LOCATION
LOCATION: BACK
LOCATION_3: HEAD
LOCATION_2: NECK

## 2023-11-16 ASSESSMENT — PAIN DESCRIPTION - PAIN TYPE: TYPE: CHRONIC PAIN

## 2023-11-16 ASSESSMENT — PAIN SCALES - GENERAL: PAINLEVEL_OUTOF10: 8

## 2023-11-16 NOTE — PROGRESS NOTES
Clinic Documentation      Education Provided:  [x] Review of Tammy Mitchell  [x] Agreement Review  [x] PEG Score Calculated [x] PHQ Score Calculated [x] ORT Score Calculated    [] Compliance Issues Discussed [] Cognitive Behavior Needs [x] Exercise [] Review of Test [] Financial Issues  [x] Tobacco/Alcohol Use Reviewed [x] Teaching [] New Patient [] Picture Obtained    Physician Plan:  [] Outgoing Referral  [] Pharmacy Consult  [x] Test Ordered [] Prescription Ordered/Changed   [] Obtained Test Results / Consult Notes        Complete if patient is withholding blood thinner for procedure     Blood Thinner Patient is currently taking:      [] Plavix (Hold for 7 days)  [] Aspirin (Hold for 5 days)     [] Pletal (Hold for 2 days)  [] Pradaxa (Hold for 3 days)    [] Effient (Hold for 7 days)  [] Xarelto (Hold for 2 days)    [] Eliquis (Hold for 2 days)  [] Brilinta (Hold for 7 days)    [] Coumadin (Hold for 5 days) - (INR needs to be drawn the day prior to procedure- INR < 2.0)    [] Aggrenox (Hold for 7 days)        [] Patient will stop medication on their own.    [] Blood Thinner Form Faxed for approval to hold.    Provider form faxed to:     Assessment Completed by:  Electronically signed by Jack Dahl RN on 11/16/2023 at 1:45 PM
Office agreement reviewed with patient and signed per patient. No questions voiced at the present and patient states understanding of agreement. Copy given to patient.
Skin is warm and dry. Neurological:      Mental Status: She is alert and oriented to person, place, and time. Cranial Nerves: No cranial nerve deficit. Psychiatric:         Mood and Affect: Mood normal.         Behavior: Behavior normal.         Thought Content: Thought content normal.         Judgment: Judgment normal.         Labs:     Lab Results   Component Value Date/Time     08/09/2023 03:17 PM    K 4.2 08/09/2023 03:17 PM    K 4.0 07/20/2020 09:37 AM     08/09/2023 03:17 PM    CO2 22 08/09/2023 03:17 PM    BUN 17 08/09/2023 03:17 PM    CREATININE 0.8 08/09/2023 03:17 PM    GLUCOSE 97 08/09/2023 03:17 PM    CALCIUM 8.7 08/09/2023 03:17 PM        Lab Results   Component Value Date    WBC 6.8 11/16/2023    HGB 13.1 11/16/2023    HCT 40.1 11/16/2023    MCV 95.9 11/16/2023     11/16/2023       Assessment:                                                                                                                                        Active Problems:    Cervical paraspinal muscle spasm    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 *      PLAN:  1. Chronic sacroiliac joint pain  - XR HIP BILATERAL W AP PELVIS (2 VIEWS); Future    2. Bilateral hip pain  - XR HIP BILATERAL W AP PELVIS (2 VIEWS); Future    3. Numbness and tingling of both upper extremities  - MRI CERVICAL SPINE WO CONTRAST; Future    4. Muscle spasms of neck  - MRI CERVICAL SPINE WO CONTRAST; Future  - cyclobenzaprine (FLEXERIL) 10 MG tablet; Take 1 tablet by mouth nightly as needed for Muscle spasms  Dispense: 30 tablet; Refill: 2    5. 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

## 2023-11-19 ENCOUNTER — TELEPHONE (OUTPATIENT)
Dept: PAIN MANAGEMENT | Age: 43
End: 2023-11-19

## 2023-12-04 RX ORDER — NORETHINDRONE ACETATE AND ETHINYL ESTRADIOL 1.5-30(21)
1 KIT ORAL DAILY
Qty: 1 PACKET | Refills: 3 | Status: SHIPPED | OUTPATIENT
Start: 2023-12-04

## 2023-12-04 RX ORDER — ONDANSETRON HYDROCHLORIDE 8 MG/1
8 TABLET, FILM COATED ORAL EVERY 8 HOURS PRN
Qty: 20 TABLET | Refills: 1 | Status: SHIPPED | OUTPATIENT
Start: 2023-12-04

## 2023-12-05 ENCOUNTER — TELEPHONE (OUTPATIENT)
Dept: PAIN MANAGEMENT | Age: 43
End: 2023-12-05

## 2023-12-05 RX ORDER — KETOROLAC TROMETHAMINE 10 MG/1
10 TABLET, FILM COATED ORAL EVERY 6 HOURS PRN
Qty: 5 TABLET | Refills: 0 | OUTPATIENT
Start: 2023-12-05 | End: 2023-12-07

## 2023-12-05 NOTE — TELEPHONE ENCOUNTER
Patient left message on nurse line that she has had a headache lasting for the past 5 days. She is not gaining any relief from her medications. She is asking if there is something that Guru Kirby can call in to the pharmacy to help with the migraine. I spoke with Guru Kirby and will call script for Toradol po q 6hrs prn #5 tablets to patient's pharmacy. Patient is to take toradol sparingly, and do not take other NSAIDs while taking this medication. Spoke with her and she verbalized understanding. Call placed to [facility], [facility].

## 2023-12-13 DIAGNOSIS — F41.9 ANXIETY: ICD-10-CM

## 2023-12-13 NOTE — TELEPHONE ENCOUNTER
Provider needs to review PDMP    PDMP Monitoring:    Last PDMP Rosas Sees as Reviewed [facility]):  Review User Review Instant Review Result   Bowen Dupont 9/8/2023  4:52 PM Reviewed PDMP [1]     Urine Drug Screenings (1 yr)       POCT Rapid Drug Screen  Collected: 8/9/2023  2:08 PM (Final result)                  Medication Contract and Consent for Opioid Use Documents Filed        No documents found

## 2023-12-14 ENCOUNTER — TELEPHONE (OUTPATIENT)
Dept: HEMATOLOGY | Age: 43
End: 2023-12-14

## 2023-12-14 RX ORDER — CLONAZEPAM 1 MG/1
1 TABLET ORAL 2 TIMES DAILY PRN
Qty: 45 TABLET | Refills: 0 | Status: SHIPPED | OUTPATIENT
Start: 2023-12-14 | End: 2024-01-13

## 2023-12-14 NOTE — TELEPHONE ENCOUNTER
Called patient and reminded patient of their appointment on 12/15/2023 and patient confirmed they would be here.

## 2023-12-15 ENCOUNTER — HOSPITAL ENCOUNTER (OUTPATIENT)
Dept: INFUSION THERAPY | Age: 43
Discharge: HOME | End: 2023-12-15
Payer: COMMERCIAL

## 2023-12-15 ENCOUNTER — OFFICE VISIT (OUTPATIENT)
Dept: HEMATOLOGY | Age: 43
End: 2023-12-15
Payer: COMMERCIAL

## 2023-12-15 VITALS
OXYGEN SATURATION: 99 % | TEMPERATURE: 98.9 F | BODY MASS INDEX: 18.88 KG/M2 | WEIGHT: 117.5 LBS | DIASTOLIC BLOOD PRESSURE: 72 MMHG | HEART RATE: 94 BPM | HEIGHT: 66 IN | SYSTOLIC BLOOD PRESSURE: 110 MMHG

## 2023-12-15 DIAGNOSIS — D69.9 BLEEDING DIATHESIS (HCC): ICD-10-CM

## 2023-12-15 DIAGNOSIS — D69.6 THROMBOCYTOPENIA (HCC): Primary | ICD-10-CM

## 2023-12-15 DIAGNOSIS — D69.6 THROMBOCYTOPENIA (HCC): ICD-10-CM

## 2023-12-15 DIAGNOSIS — D72.819 LEUKOPENIA, UNSPECIFIED TYPE: ICD-10-CM

## 2023-12-15 DIAGNOSIS — F90.9 ATTENTION DEFICIT HYPERACTIVITY DISORDER (ADHD), UNSPECIFIED ADHD TYPE: ICD-10-CM

## 2023-12-15 LAB
BASOPHILS # BLD: 0.02 K/UL (ref 0.01–0.08)
BASOPHILS NFR BLD: 0.5 % (ref 0.1–1.2)
EOSINOPHIL # BLD: 0.01 K/UL (ref 0.04–0.54)
EOSINOPHIL NFR BLD: 0.2 % (ref 0.7–7)
ERYTHROCYTE [DISTWIDTH] IN BLOOD BY AUTOMATED COUNT: 11.9 % (ref 11.7–14.4)
HCT VFR BLD AUTO: 41.9 % (ref 34.1–44.9)
HGB BLD-MCNC: 14.2 G/DL (ref 11.2–15.7)
LYMPHOCYTES # BLD: 1.08 K/UL (ref 1.18–3.74)
LYMPHOCYTES NFR BLD: 26.7 % (ref 19.3–53.1)
MCH RBC QN AUTO: 30.9 PG (ref 25.6–32.2)
MCHC RBC AUTO-ENTMCNC: 33.9 G/DL (ref 32.3–35.5)
MCV RBC AUTO: 91.3 FL (ref 79.4–94.8)
MONOCYTES # BLD: 0.29 K/UL (ref 0.24–0.82)
MONOCYTES NFR BLD: 7.2 % (ref 4.7–12.5)
NEUTROPHILS # BLD: 2.64 K/UL (ref 1.56–6.13)
NEUTS SEG NFR BLD: 65.2 % (ref 34–71.1)
PLATELET # BLD AUTO: 129 K/UL (ref 182–369)
PMV BLD AUTO: 9.2 FL (ref 7.4–10.4)
RBC # BLD AUTO: 4.59 M/UL (ref 3.93–5.22)
WBC # BLD AUTO: 4.05 K/UL (ref 3.98–10.04)

## 2023-12-15 PROCEDURE — 85025 COMPLETE CBC W/AUTO DIFF WBC: CPT

## 2023-12-15 PROCEDURE — 36415 COLL VENOUS BLD VENIPUNCTURE: CPT

## 2023-12-15 PROCEDURE — 99213 OFFICE O/P EST LOW 20 MIN: CPT | Performed by: NURSE PRACTITIONER

## 2023-12-15 PROCEDURE — 99212 OFFICE O/P EST SF 10 MIN: CPT

## 2023-12-15 RX ORDER — DEXTROAMPHETAMINE SACCHARATE, AMPHETAMINE ASPARTATE MONOHYDRATE, DEXTROAMPHETAMINE SULFATE AND AMPHETAMINE SULFATE 7.5; 7.5; 7.5; 7.5 MG/1; MG/1; MG/1; MG/1
30 CAPSULE, EXTENDED RELEASE ORAL DAILY
Qty: 30 CAPSULE | Refills: 0 | Status: SHIPPED | OUTPATIENT
Start: 2023-12-15 | End: 2024-01-14

## 2023-12-15 NOTE — TELEPHONE ENCOUNTER
Henley Jose called to request a refill on her medication. Last office visit : 9/25/2023   Next office visit : 3/25/2024     Last UDS:   Amphetamine Screen, Urine   Date Value Ref Range Status   08/09/2023 positive  Final     Barbiturate Screen, Urine   Date Value Ref Range Status   08/09/2023 n  Final     Benzodiazepine Screen, Urine   Date Value Ref Range Status   08/09/2023 n  Final     Buprenorphine Urine   Date Value Ref Range Status   08/09/2023 n  Final     Cocaine Metabolite Screen, Urine   Date Value Ref Range Status   08/09/2023 n  Final     Gabapentin Screen, Urine   Date Value Ref Range Status   08/09/2023 n  Final     MDMA, Urine   Date Value Ref Range Status   08/09/2023 n  Final     Methamphetamine, Urine   Date Value Ref Range Status   08/09/2023 n  Final     Opiate Scrn, Ur   Date Value Ref Range Status   08/09/2023 n  Final     Oxycodone Screen, Ur   Date Value Ref Range Status   08/09/2023 n  Final     PCP Screen, Urine   Date Value Ref Range Status   08/09/2023 n  Final     Propoxyphene Screen, Urine   Date Value Ref Range Status   08/09/2023 n  Final     THC Screen, Urine   Date Value Ref Range Status   08/09/2023 n  Final     Tricyclic Antidepressants, Urine   Date Value Ref Range Status   08/09/2023 n  Final       Last Lattie Barbone: NA  Medication Contract: N/A   Last Fill: 11/15/23    Requested Prescriptions     Pending Prescriptions Disp Refills    amphetamine-dextroamphetamine (ADDERALL XR) 30 MG extended release capsule 30 capsule 0     Sig: Take 1 capsule by mouth daily for 30 days. Please approve or refuse this medication.    Ashleigh Donis LPN

## 2023-12-15 NOTE — PROGRESS NOTES
Progress note      Pt Name: Manuel Ballesteros  YOB: 1980  MRN: [id number]    Date of evaluation: 12/15/2023  History Obtained From:  patient, electronic medical record    CHIEF COMPLAINT:    Chief Complaint   Patient presents with    Follow-up     Thrombocytopenia     HISTORY OF PRESENT ILLNESS:    Manuel Ballesteros is a 41 y.o.  female who returns today in follow-up for history of bleeding diatheses (work-up has been negative for von Willebrand disease on 9/7/2019 and on 4/6/2023), chronic mild thrombocytopenia and intermittent mild leukopenia without neutropenia. Current recommendation is conservative monitoring, in the future [facility] any [facility] will make a referral to the Hemophilia clinic to see ALVERTO Richards. She returns today in scheduled follow-up for evaluation, lab monitoring and further treatment recommendations. Today's clinic visit to include physical assessment, review of systems, any lab or radiographic findings that were available and plan of care are documented below. HEMATOLOGY HISTORY:  Diagnosis:  Chronic mild thrombocytopenia  Mild leukopenia without neutropenia  Bleeding diatheses  (work-up has been negative for von Willebrand disease on 9/7/2019 and on 4/6/2023)    Treatment summary:  9/27/2019-  von Willebrand factor results were not consistent with a diagnosis of von Willebrand disease according to the current NHLBI guidelines. Conservative monitoring      [facility] on 9/27/2019 for bleeding diatheses with concern for a diagnosis of von Willebrand disease. [facility] has previously been evaluated by hematologist, Dr. Manuel Vo in March 2015 and indicated that lab results were not suggestive of von Willebrand's disease. She presented with no specific complaints other than increased fatigue.  [facility] reported experiencing heavy menstrual cycles that were controlled with birth control, occasional bleeding of

## 2023-12-26 RX ORDER — SUMATRIPTAN 50 MG/1
50 TABLET, FILM COATED ORAL
Qty: 9 TABLET | Refills: 1 | Status: SHIPPED | OUTPATIENT
Start: 2023-12-26 | End: 2024-01-26

## 2024-01-08 DIAGNOSIS — R51.9 ACUTE NONINTRACTABLE HEADACHE, UNSPECIFIED HEADACHE TYPE: ICD-10-CM

## 2024-01-08 RX ORDER — BUTALBITAL, ACETAMINOPHEN AND CAFFEINE 50; 325; 40 MG/1; MG/1; MG/1
1 TABLET ORAL EVERY 6 HOURS PRN
Qty: 45 TABLET | Refills: 0 | Status: SHIPPED | OUTPATIENT
Start: 2024-01-08

## 2024-01-08 NOTE — TELEPHONE ENCOUNTER
PDMP Monitoring:    Last PDMP Greg as Reviewed ([place]):  Review User Review Instant Review Result   MATTHEW MELVINA 12/15/2023  4:34 PM Reviewed PDMP [1]     Urine Drug Screenings (1 yr)       POCT Rapid Drug Screen  Collected: 8/9/2023  2:08 PM (Final result)                  Medication Contract and Consent for Opioid Use Documents Filed        No documents found

## 2024-01-10 ENCOUNTER — ANESTHESIA (OUTPATIENT)
Dept: OPERATING ROOM | Age: 44
End: 2024-01-10

## 2024-01-10 ENCOUNTER — ANESTHESIA EVENT (OUTPATIENT)
Dept: OPERATING ROOM | Age: 44
End: 2024-01-10

## 2024-01-10 ENCOUNTER — HOSPITAL ENCOUNTER (OUTPATIENT)
Age: 44
Setting detail: OUTPATIENT SURGERY
Discharge: HOME | End: 2024-01-10
Attending: INTERNAL MEDICINE | Admitting: INTERNAL MEDICINE

## 2024-01-10 VITALS
TEMPERATURE: 97 F | OXYGEN SATURATION: 99 % | HEART RATE: 68 BPM | SYSTOLIC BLOOD PRESSURE: 92 MMHG | BODY MASS INDEX: 18 KG/M2 | WEIGHT: 112 LBS | RESPIRATION RATE: 16 BRPM | DIASTOLIC BLOOD PRESSURE: 66 MMHG | HEIGHT: 66 IN

## 2024-01-10 DIAGNOSIS — F41.9 ANXIETY: ICD-10-CM

## 2024-01-10 LAB
CONTROL: NORMAL
PREGNANCY TEST URINE, POC: NORMAL

## 2024-01-10 PROCEDURE — 38222 DX BONE MARROW BX & ASPIR: CPT

## 2024-01-10 RX ORDER — PROPOFOL 10 MG/ML
INJECTION, EMULSION INTRAVENOUS PRN
Status: DISCONTINUED | OUTPATIENT
Start: 2024-01-10 | End: 2024-01-10 | Stop reason: SDUPTHER

## 2024-01-10 RX ORDER — LIDOCAINE HYDROCHLORIDE 10 MG/ML
INJECTION, SOLUTION INFILTRATION; PERINEURAL PRN
Status: DISCONTINUED | OUTPATIENT
Start: 2024-01-10 | End: 2024-01-10 | Stop reason: ALTCHOICE

## 2024-01-10 RX ORDER — SODIUM CHLORIDE, SODIUM LACTATE, POTASSIUM CHLORIDE, CALCIUM CHLORIDE 600; 310; 30; 20 MG/100ML; MG/100ML; MG/100ML; MG/100ML
INJECTION, SOLUTION INTRAVENOUS CONTINUOUS
Status: DISCONTINUED | OUTPATIENT
Start: 2024-01-10 | End: 2024-01-10 | Stop reason: HOSPADM

## 2024-01-10 RX ORDER — CLONAZEPAM 1 MG/1
1 TABLET ORAL 2 TIMES DAILY PRN
Qty: 45 TABLET | Refills: 0 | Status: SHIPPED | OUTPATIENT
Start: 2024-01-10 | End: 2024-02-09

## 2024-01-10 RX ORDER — LIDOCAINE HYDROCHLORIDE 10 MG/ML
INJECTION, SOLUTION EPIDURAL; INFILTRATION; INTRACAUDAL; PERINEURAL PRN
Status: DISCONTINUED | OUTPATIENT
Start: 2024-01-10 | End: 2024-01-10 | Stop reason: SDUPTHER

## 2024-01-10 RX ADMIN — PROPOFOL 50 MG: 10 INJECTION, EMULSION INTRAVENOUS at 14:25

## 2024-01-10 RX ADMIN — SODIUM CHLORIDE, SODIUM LACTATE, POTASSIUM CHLORIDE, CALCIUM CHLORIDE: 600; 310; 30; 20 INJECTION, SOLUTION INTRAVENOUS at 13:54

## 2024-01-10 RX ADMIN — LIDOCAINE HYDROCHLORIDE 5 ML: 10 INJECTION, SOLUTION EPIDURAL; INFILTRATION; INTRACAUDAL; PERINEURAL at 14:24

## 2024-01-10 RX ADMIN — PROPOFOL 50 MG: 10 INJECTION, EMULSION INTRAVENOUS at 14:24

## 2024-01-10 ASSESSMENT — PAIN DESCRIPTION - LOCATION: LOCATION: HIP

## 2024-01-10 ASSESSMENT — PAIN SCALES - GENERAL: PAINLEVEL_OUTOF10: 3

## 2024-01-10 NOTE — PROCEDURES
ALVERTO Willis          Patient name: Dionne Guzman  Patient : 1980  [id number]  1/10/2024    Procedure Note: Bone Marrow Aspirate and Biopsy    Indication:  Thrombocytopenia and leukopenia    SITE: Right Iliac crest   After informed consent was obtained the patient was placed in the Left lateral ducubitus position. A time out was performed indicating the procedure and the patient, and all was comfirmed by the nurse. The Right posterior iliac crest was located and prepped and draped in a sterile fashion Lidocaine 1% was injected for local anesthesia. An Jamshidi needle was placed and a bone marrow aspirate was obtained. The Jamshidi needle was placed in a different location and a core biopsy was obtained. Pressure was held until homeostasis was obtained. A sterile dressing was applied. The patient tolerated the procedure well with no apparent complications. Estimated blood loss was minimal. The specimen was sent to pathology for flow cytometry, cytogenetics, histology and FISH if indicated.       ALVERTO Willis
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
## 2024-01-10 NOTE — DISCHARGE SUMMARY
Discharge Summary    Dionne Guzman  :  1980  MRN:  [id number]    Admit date:  1/10/2024  Discharge date:  1/10/2024      Primary Care Physician:  Marla Cortez MD    Portions of this note have been copied forward, however, changed to reflect the most current clinical status of this patient.    Discharge Diagnoses: Thrombocytopenia with leukopenia      Significant Diagnostic procedure: Bone marrow aspiration and biopsy    Outpatient clinical course:  Bone marrow aspiration and biopsy was tolerated well, please see procedural note.      Physical Exam: Please see today's History and Physical note    Vitals: BP 92/66   Pulse 68   Temp 97 °F (36.1 °C) (Temporal)   Resp 16   Ht 1.676 m (5' 6\")   Wt 50.8 kg (112 lb)   SpO2 99%   BMI 18.08 kg/m²     Discharge Medications:         Medication List        ASK your doctor about these medications      amphetamine-dextroamphetamine 30 MG extended release capsule  Commonly known as: Adderall XR  Take 1 capsule by mouth daily for 30 days.     butalbital-acetaminophen-caffeine -40 MG per tablet  Commonly known as: FIORICET, ESGIC  TAKE 1 TABLET BY MOUTH EVERY 6 HOURS AS NEEDED FOR HEADACHES     clonazePAM 1 MG tablet  Commonly known as: KLONOPIN  Take 1 tablet by mouth 2 times daily as needed for Anxiety for up to 30 days. Max Daily Amount: 2 mg     cyclobenzaprine 10 MG tablet  Commonly known as: FLEXERIL     ketorolac 10 MG tablet  Commonly known as: TORADOL  Take 1 tablet by mouth every 6 hours as needed for Pain     methocarbamol 750 MG tablet  Commonly known as: ROBAXIN  Take 1 tablet by mouth 3 times daily     norethindrone-ethinyl estradiol-iron 1.5-30 MG-MCG tablet  Commonly known as: Loestrin Fe 1.5/30  Take 1 tablet by mouth daily     ondansetron 8 MG tablet  Commonly known as: ZOFRAN  Take 1 tablet by mouth every 8 hours as needed for Nausea or Vomiting     SUMAtriptan 50 MG tablet  Commonly known as: Imitrex  Take 1 tablet by mouth once as

## 2024-01-10 NOTE — ANESTHESIA PRE PROCEDURE
Department of Anesthesiology  Preprocedure Note       Name:  Dionne Guzman   Age:  43 y.o.  :  1980                                          MRN:  [id number]         Date:  1/10/2024      Surgeon: Surgeon(s):  Odalys Winston APRN    Procedure: Procedure(s):    EGD BIOPSY      Medications prior to admission:   Prior to Admission medications    Medication Sig Start Date End Date Taking? Authorizing Provider   butalbital-acetaminophen-caffeine (FIORICET, ESGIC) -40 MG per tablet TAKE 1 TABLET BY MOUTH EVERY 6 HOURS AS NEEDED FOR HEADACHES 24   Marla Cortez MD   SUMAtriptan (IMITREX) 50 MG tablet Take 1 tablet by mouth once as needed for Migraine 23  Abner Gongora MD   amphetamine-dextroamphetamine (ADDERALL XR) 30 MG extended release capsule Take 1 capsule by mouth daily for 30 days. 12/15/23 1/14/24  Abner Gongora MD   clonazePAM (KLONOPIN) 1 MG tablet Take 1 tablet by mouth 2 times daily as needed for Anxiety for up to 30 days. Max Daily Amount: 2 mg 23  Marla Cortez MD   ketorolac (TORADOL) 10 MG tablet Take 1 tablet by mouth every 6 hours as needed for Pain  Patient not taking: Reported on 12/15/2023 12/5/23 12/7/23  Nora Garza APRN - CNP   ondansetron (ZOFRAN) 8 MG tablet Take 1 tablet by mouth every 8 hours as needed for Nausea or Vomiting 23   Marla Cortez MD   norethindrone-ethinyl estradiol-iron (LOESTRIN FE 1.530) 1.5-30 MG-MCG tablet Take 1 tablet by mouth daily 23   Marla Cortez MD   topiramate (TOPAMAX) 200 MG tablet Take 1 tablet by mouth 2 times daily 23   Marla Cortez MD   cyclobenzaprine (FLEXERIL) 10 MG tablet Take 1 tablet by mouth daily 23   Provider, MD Marianela   methocarbamol (ROBAXIN) 750 MG tablet Take 1 tablet by mouth 3 times daily 23   Nora Garza, ALVERTO - CNP       Current medications:    Current Outpatient Medications   Medication Sig

## 2024-01-10 NOTE — ANESTHESIA POSTPROCEDURE EVALUATION
Department of Anesthesiology  Postprocedure Note    Patient: Dionne Guzman  MRN: [id number]  YOB: 1980  Date of evaluation: 1/10/2024    Procedure Summary     Date: 01/10/24 Room / Location: [place] [address] / [facility]    Anesthesia Start: 1418 Anesthesia Stop:     Procedure: BONE MARROW ASPIRATION BIOPSY (Pelvis) Diagnosis:       Thrombocythemia      (Thrombocythemia [D75.839])    Surgeons: Odalys Winston APRN Responsible Provider: Ginna Perera APRN - CRNA    Anesthesia Type: TIVA, general ASA Status: 2          Anesthesia Type: No value filed.    Mk Phase I:      Mk Phase II:      Anesthesia Post Evaluation    Patient location during evaluation: bedside  Patient participation: complete - patient participated  Level of consciousness: awake and alert  Pain score: 0  Airway patency: patent  Nausea & Vomiting: no nausea and no vomiting  Cardiovascular status: blood pressure returned to baseline  Respiratory status: acceptable and room air  Hydration status: euvolemic        No notable events documented.

## 2024-01-17 DIAGNOSIS — F90.9 ATTENTION DEFICIT HYPERACTIVITY DISORDER (ADHD), UNSPECIFIED ADHD TYPE: ICD-10-CM

## 2024-01-17 RX ORDER — DEXTROAMPHETAMINE SACCHARATE, AMPHETAMINE ASPARTATE MONOHYDRATE, DEXTROAMPHETAMINE SULFATE AND AMPHETAMINE SULFATE 7.5; 7.5; 7.5; 7.5 MG/1; MG/1; MG/1; MG/1
30 CAPSULE, EXTENDED RELEASE ORAL DAILY
Qty: 30 CAPSULE | Refills: 0 | Status: SHIPPED | OUTPATIENT
Start: 2024-01-17 | End: 2024-02-16

## 2024-01-24 ENCOUNTER — TELEPHONE (OUTPATIENT)
Dept: HEMATOLOGY | Age: 44
End: 2024-01-24

## 2024-01-25 DIAGNOSIS — D69.6 THROMBOCYTOPENIA (HCC): Primary | ICD-10-CM

## 2024-01-25 NOTE — PROGRESS NOTES
alcohol or medication induced, and metastatic carcinoma; confirmed on bone marrow aspiration biopsy on 1/10/2024.      Denies any bleeding to include melena, epistaxis, hemoptysis, hematuria or hematochezia.    CBC today reports a stable platelet count of 143,000 today    I reviewed the bone marrow aspiration biopsy results, diagnosis of ITP and recommendation with Dionne today.    01/10/2024 Bone marrow biopsy and aspiration- normocellular (45-60% cellular) marrow with trilineage hematopoiesis.  No overt dysplasia and no increase in blasts.  No stainable iron, no ring sideroblasts.  No evidence of lymphoma, plasma cell myeloma and metastatic carcinoma.  No B-cell monoclonality and no T-cell aberrant antigenic expression. Negative FISH study for myelodysplasia profile.  Normal female karyotype.     - Contact the clinic with any bleeding  -Conservative monitoring of platelet count    2.  Intermittent leukopenia with neutropenia: Currently resolved with a WBC of 5.57 and an ANC of 4.12 today.  Denies any febrile illness or infectious sequelae.    -Contact the clinic with any febrile illness and CBC can be obtained    3. Bleeding disorder:  (work-up has been negative for von Willebrand disease on 9/7/2019 and on 4/6/2023)  Denies any bleeding to include melena, epistaxis, hemoptysis, hematuria or hematochezia.    04/06/2023 Serology results  Factor VIII 127%  VWF-%  Von Willebrand Ag 132%  Factor 11 146%  Lupus Anticoag not detected     - If Dionne experiences any bleeding diatheses will make a referral to the Hemophilia clinic to see ALVERTO Dietrich for further evaluation and recommendations.    4.  Bone marrow aspiration biopsy on 1/10/2024 reported no stainable storage iron and reports history of iron deficiency with intolerance to oral iron and having significant fatigue.  Hemoglobin 12.5 and hematocrit 37.8 with an MCV of 93.6 today, within normal limits.    -Obtain ferritin, iron panel and CMP      I

## 2024-01-26 ENCOUNTER — OFFICE VISIT (OUTPATIENT)
Dept: HEMATOLOGY | Age: 44
End: 2024-01-26
Payer: COMMERCIAL

## 2024-01-26 ENCOUNTER — HOSPITAL ENCOUNTER (OUTPATIENT)
Dept: INFUSION THERAPY | Age: 44
Discharge: HOME | End: 2024-01-26
Payer: COMMERCIAL

## 2024-01-26 VITALS
SYSTOLIC BLOOD PRESSURE: 110 MMHG | HEIGHT: 66 IN | OXYGEN SATURATION: 98 % | TEMPERATURE: 97.9 F | WEIGHT: 116.5 LBS | HEART RATE: 80 BPM | DIASTOLIC BLOOD PRESSURE: 76 MMHG | BODY MASS INDEX: 18.72 KG/M2

## 2024-01-26 DIAGNOSIS — D72.819 LEUKOPENIA, UNSPECIFIED TYPE: ICD-10-CM

## 2024-01-26 DIAGNOSIS — R88.8 VERY LOW IRON STORES IN BONE MARROW: ICD-10-CM

## 2024-01-26 DIAGNOSIS — D69.9 BLEEDING DIATHESIS (HCC): ICD-10-CM

## 2024-01-26 DIAGNOSIS — D69.6 THROMBOCYTOPENIA (HCC): ICD-10-CM

## 2024-01-26 DIAGNOSIS — D69.3 IDIOPATHIC THROMBOCYTOPENIC PURPURA (HCC): Primary | ICD-10-CM

## 2024-01-26 LAB
ALBUMIN SERPL-MCNC: 3.8 G/DL (ref 3.5–5.2)
ALP SERPL-CCNC: 38 U/L (ref 35–104)
ALT SERPL-CCNC: 20 U/L (ref 9–52)
ANION GAP SERPL CALCULATED.3IONS-SCNC: 9 MMOL/L (ref 7–19)
AST SERPL-CCNC: 19 U/L (ref 14–36)
BASOPHILS # BLD: 0.02 K/UL (ref 0.01–0.08)
BASOPHILS NFR BLD: 0.4 % (ref 0.1–1.2)
BILIRUB SERPL-MCNC: <0.2 MG/DL (ref 0.2–1.3)
BUN SERPL-MCNC: 14 MG/DL (ref 7–17)
CALCIUM SERPL-MCNC: 8.3 MG/DL (ref 8.4–10.2)
CHLORIDE SERPL-SCNC: 108 MMOL/L (ref 98–111)
CO2 SERPL-SCNC: 24 MMOL/L (ref 22–29)
CREAT SERPL-MCNC: 0.6 MG/DL (ref 0.5–1)
EOSINOPHIL # BLD: 0.01 K/UL (ref 0.04–0.54)
EOSINOPHIL NFR BLD: 0.2 % (ref 0.7–7)
ERYTHROCYTE [DISTWIDTH] IN BLOOD BY AUTOMATED COUNT: 12.5 % (ref 11.7–14.4)
FERRITIN SERPL-MCNC: 36.6 NG/ML (ref 13–150)
GLOBULIN: 2.5 G/DL
GLUCOSE SERPL-MCNC: 125 MG/DL (ref 74–106)
HCT VFR BLD AUTO: 37.8 % (ref 34.1–44.9)
HGB BLD-MCNC: 12.5 G/DL (ref 11.2–15.7)
IRON SATN MFR SERPL: 21 % (ref 14–50)
IRON SERPL-MCNC: 55 UG/DL (ref 37–145)
LYMPHOCYTES # BLD: 1.09 K/UL (ref 1.18–3.74)
LYMPHOCYTES NFR BLD: 19.6 % (ref 19.3–53.1)
MCH RBC QN AUTO: 30.9 PG (ref 25.6–32.2)
MCHC RBC AUTO-ENTMCNC: 33.1 G/DL (ref 32.3–35.5)
MCV RBC AUTO: 93.6 FL (ref 79.4–94.8)
MONOCYTES # BLD: 0.31 K/UL (ref 0.24–0.82)
MONOCYTES NFR BLD: 5.6 % (ref 4.7–12.5)
NEUTROPHILS # BLD: 4.12 K/UL (ref 1.56–6.13)
NEUTS SEG NFR BLD: 73.8 % (ref 34–71.1)
PLATELET # BLD AUTO: 143 K/UL (ref 182–369)
PMV BLD AUTO: 9.9 FL (ref 7.4–10.4)
POTASSIUM SERPL-SCNC: 3.2 MMOL/L (ref 3.5–5.1)
PROT SERPL-MCNC: 6.4 G/DL (ref 6.3–8.2)
RBC # BLD AUTO: 4.04 M/UL (ref 3.93–5.22)
SODIUM SERPL-SCNC: 141 MMOL/L (ref 137–145)
TIBC SERPL-MCNC: 260 UG/DL (ref 250–400)
WBC # BLD AUTO: 5.57 K/UL (ref 3.98–10.04)

## 2024-01-26 PROCEDURE — 80053 COMPREHEN METABOLIC PANEL: CPT

## 2024-01-26 PROCEDURE — 36415 COLL VENOUS BLD VENIPUNCTURE: CPT

## 2024-01-26 PROCEDURE — 85025 COMPLETE CBC W/AUTO DIFF WBC: CPT

## 2024-01-26 PROCEDURE — 99212 OFFICE O/P EST SF 10 MIN: CPT

## 2024-01-26 PROCEDURE — 99214 OFFICE O/P EST MOD 30 MIN: CPT | Performed by: NURSE PRACTITIONER

## 2024-01-29 ENCOUNTER — TELEPHONE (OUTPATIENT)
Dept: HEMATOLOGY | Age: 44
End: 2024-01-29

## 2024-01-29 DIAGNOSIS — E87.6 HYPOKALEMIA: Primary | ICD-10-CM

## 2024-01-29 RX ORDER — POTASSIUM CHLORIDE 20 MEQ/1
TABLET, EXTENDED RELEASE ORAL
Qty: 30 TABLET | Refills: 0 | Status: SHIPPED | OUTPATIENT
Start: 2024-01-29

## 2024-01-29 NOTE — TELEPHONE ENCOUNTER
Called patient and reviewed lab results, K+ 3.2.  Instructed that ALVERTO Ko wants her to take potassium 40 meq today and then 20 meq daily until repeat CMP in one week.  Also reviewed iron results.  Patient wanting to know if she needs to get slow release iron and take it.  Instructed that her iron levels are normal but she can get over the counter slow release iron and take it three times a week.  Patient v/u and is agreeable to plan.  Prescription sent to [facility].

## 2024-01-29 NOTE — TELEPHONE ENCOUNTER
----- Message from ALVERTO Willis sent at 1/28/2024  6:14 PM CST -----  Potassium is low at 3.2: Please call and see if she is taking any potassium supplement.  She will need replacement with potassium 40 mEq p.o. x 1 and then 20 mEq daily.  Encouraged her to increase her potassium rich foods.  Requested a repeat CMP in 1 week.

## 2024-02-05 DIAGNOSIS — R51.9 ACUTE NONINTRACTABLE HEADACHE, UNSPECIFIED HEADACHE TYPE: ICD-10-CM

## 2024-02-06 RX ORDER — BUTALBITAL, ACETAMINOPHEN AND CAFFEINE 50; 325; 40 MG/1; MG/1; MG/1
1 TABLET ORAL EVERY 6 HOURS PRN
Qty: 45 TABLET | Refills: 0 | Status: SHIPPED | OUTPATIENT
Start: 2024-02-06

## 2024-02-08 ENCOUNTER — HOSPITAL ENCOUNTER (OUTPATIENT)
Dept: PAIN MANAGEMENT | Age: 44
Discharge: HOME | End: 2024-02-08
Payer: COMMERCIAL

## 2024-02-08 VITALS
HEIGHT: 66 IN | SYSTOLIC BLOOD PRESSURE: 101 MMHG | TEMPERATURE: 97.5 F | DIASTOLIC BLOOD PRESSURE: 69 MMHG | RESPIRATION RATE: 16 BRPM | BODY MASS INDEX: 18.16 KG/M2 | WEIGHT: 113 LBS | OXYGEN SATURATION: 98 % | HEART RATE: 90 BPM

## 2024-02-08 DIAGNOSIS — M62.838 MUSCLE SPASMS OF NECK: Primary | ICD-10-CM

## 2024-02-08 DIAGNOSIS — G43.109 MIGRAINE WITH AURA AND WITHOUT STATUS MIGRAINOSUS, NOT INTRACTABLE: ICD-10-CM

## 2024-02-08 DIAGNOSIS — M40.40: ICD-10-CM

## 2024-02-08 DIAGNOSIS — M54.2 NECK PAIN: ICD-10-CM

## 2024-02-08 PROCEDURE — 99213 OFFICE O/P EST LOW 20 MIN: CPT

## 2024-02-08 RX ORDER — CYCLOBENZAPRINE HCL 10 MG
10 TABLET ORAL NIGHTLY PRN
Qty: 30 TABLET | Refills: 2 | Status: SHIPPED | OUTPATIENT
Start: 2024-02-08 | End: 2024-05-08

## 2024-02-08 ASSESSMENT — PAIN DESCRIPTION - LOCATION
LOCATION_3: NECK
LOCATION: BACK

## 2024-02-08 ASSESSMENT — PAIN DESCRIPTION - INTENSITY: RATING_3: 9

## 2024-02-08 ASSESSMENT — ENCOUNTER SYMPTOMS
PHOTOPHOBIA: 1
VISUAL CHANGE: 1
BACK PAIN: 1
CONSTIPATION: 0
GASTROINTESTINAL NEGATIVE: 1
RESPIRATORY NEGATIVE: 1

## 2024-02-08 ASSESSMENT — PAIN DESCRIPTION - ORIENTATION
ORIENTATION_3: LEFT;RIGHT
ORIENTATION: LOWER

## 2024-02-08 ASSESSMENT — PAIN DESCRIPTION - PAIN TYPE: TYPE: CHRONIC PAIN

## 2024-02-08 ASSESSMENT — PAIN SCALES - GENERAL: PAINLEVEL_OUTOF10: 10

## 2024-02-08 NOTE — PROGRESS NOTES
Clinic Documentation      Education Provided:  [x] Review of Jamel  [] Agreement Review  [x] PEG Score Calculated [] PHQ Score Calculated [] ORT Score Calculated    [] Compliance Issues Discussed [] Cognitive Behavior Needs [x] Exercise [] Review of Test [] Financial Issues  [x] Tobacco/Alcohol Use Reviewed [x] Teaching [] New Patient [] Picture Obtained    Physician Plan:  [] Outgoing Referral  [] Pharmacy Consult  [] Test Ordered [x] Prescription Ordered/Changed   [] Obtained Test Results / Consult Notes        Complete if patient is withholding blood thinner for procedure     Blood Thinner Patient is currently taking:      [] Plavix (Hold for 7 days)  [] Aspirin (Hold for 5 days)     [] Pletal (Hold for 2 days)  [] Pradaxa (Hold for 3 days)    [] Effient (Hold for 7 days)  [] Xarelto (Hold for 2 days)    [] Eliquis (Hold for 2 days)  [] Brilinta (Hold for 7 days)    [] Coumadin (Hold for 5 days) - (INR needs to be drawn the day prior to procedure- INR < 2.0)    [] Aggrenox (Hold for 7 days)        [] Patient will stop medication on their own.    [] Blood Thinner Form Faxed for approval to hold.   Provider form faxed to:     Assessment Completed by:  Electronically signed by Krista Sanon MA on 2/8/2024 at 3:07 PM        Clinic Documentation      Education Provided:  [x] Review of Jamel  [] Agreement Review  [x] PEG Score Calculated [] PHQ Score Calculated [] ORT Score Calculated    [] Compliance Issues Discussed [] Cognitive Behavior Needs [x] Exercise [] Review of Test [] Financial Issues  [x] Tobacco/Alcohol Use Reviewed [x] Teaching [] New Patient [] Picture Obtained    Physician Plan:  [] Outgoing Referral  [] Pharmacy Consult  [] Test Ordered [] Prescription Ordered/Changed   [] Obtained Test Results / Consult Notes        Complete if patient is withholding blood thinner for procedure     Blood Thinner Patient is currently taking:      [] Plavix (Hold for 7 days)  [] Aspirin (Hold for 5 days) 
complaining about low back pain.  She states that it goes across the low back into the hips.  Patient has been told that she has 1 leg longer than the other when she goes to the chiropractor.  Patient points to the SI joints as the source of her pain.      10/2022  Patient having migraines after the age of 18 after she suffered a head injury in an accident.  Through the years, patient has been on numerous headache/migraine medication without success in alleviating her pain.  Patient did try Botox at 1 appointment which she had significant relief in headaches however daughter was unable to get Botox authorized for continued treatment.    Since headache migraines limits her ability to to complete tasks such as errands and household chores.  Patient did have to stop working for a period of time due to the debilitating headache/Migraines.  Patient does miss social, family or leisurely activities due to headache and migraine.  In the last year patient has been in the emergency room 3-5 times due to headaches/migraines.    Prior to having a migraine patient frequently has visual disturbances and or numbness on one side of her head.  During a migraine patient will become nauseated will be sensitive to light and will often have pain on one side.    Patient's medical records do not go past 2017.  Patient states she has been on numerous medications throughout the years therefore list of medications below has been completed best to patient and providers ability.    Discussed risk versus benefits of Botox injections.  Discussed side effects that could be experienced as follows generalized muscle weakness, diplopia, proptosis, dysphagia, dysphonia, dysarthria, urinary incontinence, and breathing difficulties.  Discussed Botox is an ongoing process that will evolve over time.  Explained that patient response to Botox will be assessed and adjustments in dosages will be made appropriately.  Patient understands that it is important

## 2024-02-13 DIAGNOSIS — F41.9 ANXIETY: ICD-10-CM

## 2024-02-13 RX ORDER — CLONAZEPAM 1 MG/1
1 TABLET ORAL 2 TIMES DAILY PRN
Qty: 45 TABLET | Refills: 0 | Status: SHIPPED | OUTPATIENT
Start: 2024-02-13 | End: 2024-03-14

## 2024-02-13 NOTE — TELEPHONE ENCOUNTER
Dionne Guzman called to request a refill on her medication.      Last office visit : 9/25/2023   Next office visit : 3/25/2024     Last UDS:   Amphetamine Screen, Urine   Date Value Ref Range Status   08/09/2023 positive  Final     Barbiturate Screen, Urine   Date Value Ref Range Status   08/09/2023 n  Final     Benzodiazepine Screen, Urine   Date Value Ref Range Status   08/09/2023 n  Final     Buprenorphine Urine   Date Value Ref Range Status   08/09/2023 n  Final     Cocaine Metabolite Screen, Urine   Date Value Ref Range Status   08/09/2023 n  Final     Gabapentin Screen, Urine   Date Value Ref Range Status   08/09/2023 n  Final     MDMA, Urine   Date Value Ref Range Status   08/09/2023 n  Final     Methamphetamine, Urine   Date Value Ref Range Status   08/09/2023 n  Final     Opiate Scrn, Ur   Date Value Ref Range Status   08/09/2023 n  Final     Oxycodone Screen, Ur   Date Value Ref Range Status   08/09/2023 n  Final     PCP Screen, Urine   Date Value Ref Range Status   08/09/2023 n  Final     Propoxyphene Screen, Urine   Date Value Ref Range Status   08/09/2023 n  Final     THC Screen, Urine   Date Value Ref Range Status   08/09/2023 n  Final     Tricyclic Antidepressants, Urine   Date Value Ref Range Status   08/09/2023 n  Final         Medication Contract: NA   Last Fill: 1/10/24    Requested Prescriptions     Pending Prescriptions Disp Refills    clonazePAM (KLONOPIN) 1 MG tablet 45 tablet 0     Sig: Take 1 tablet by mouth 2 times daily as needed for Anxiety for up to 30 days. Max Daily Amount: 2 mg         Please approve or refuse this medication.   Ronda Andrew LPN

## 2024-02-15 DIAGNOSIS — F90.9 ATTENTION DEFICIT HYPERACTIVITY DISORDER (ADHD), UNSPECIFIED ADHD TYPE: ICD-10-CM

## 2024-02-16 RX ORDER — DEXTROAMPHETAMINE SACCHARATE, AMPHETAMINE ASPARTATE MONOHYDRATE, DEXTROAMPHETAMINE SULFATE AND AMPHETAMINE SULFATE 7.5; 7.5; 7.5; 7.5 MG/1; MG/1; MG/1; MG/1
30 CAPSULE, EXTENDED RELEASE ORAL DAILY
Qty: 30 CAPSULE | Refills: 0 | Status: SHIPPED | OUTPATIENT
Start: 2024-02-16 | End: 2024-03-17

## 2024-02-16 NOTE — TELEPHONE ENCOUNTER
Dionne Guzman called to request a refill on her medication.      Last office visit : 9/25/2023   Next office visit : 3/25/2024     Last UDS:   Amphetamine Screen, Urine   Date Value Ref Range Status   08/09/2023 positive  Final     Barbiturate Screen, Urine   Date Value Ref Range Status   08/09/2023 n  Final     Benzodiazepine Screen, Urine   Date Value Ref Range Status   08/09/2023 n  Final     Buprenorphine Urine   Date Value Ref Range Status   08/09/2023 n  Final     Cocaine Metabolite Screen, Urine   Date Value Ref Range Status   08/09/2023 n  Final     Gabapentin Screen, Urine   Date Value Ref Range Status   08/09/2023 n  Final     MDMA, Urine   Date Value Ref Range Status   08/09/2023 n  Final     Methamphetamine, Urine   Date Value Ref Range Status   08/09/2023 n  Final     Opiate Scrn, Ur   Date Value Ref Range Status   08/09/2023 n  Final     Oxycodone Screen, Ur   Date Value Ref Range Status   08/09/2023 n  Final     PCP Screen, Urine   Date Value Ref Range Status   08/09/2023 n  Final     Propoxyphene Screen, Urine   Date Value Ref Range Status   08/09/2023 n  Final     THC Screen, Urine   Date Value Ref Range Status   08/09/2023 n  Final     Tricyclic Antidepressants, Urine   Date Value Ref Range Status   08/09/2023 n  Final         Medication Contract: n/a   Last Fill: 1/17/24    Requested Prescriptions     Pending Prescriptions Disp Refills    amphetamine-dextroamphetamine (ADDERALL XR) 30 MG extended release capsule 30 capsule 0     Sig: Take 1 capsule by mouth daily for 30 days.         Please approve or refuse this medication.   Ronda Andrew LPN

## 2024-02-22 ENCOUNTER — APPOINTMENT (OUTPATIENT)
Dept: CT IMAGING | Age: 44
End: 2024-02-22
Payer: COMMERCIAL

## 2024-02-22 ENCOUNTER — HOSPITAL ENCOUNTER (EMERGENCY)
Age: 44
Discharge: HOME | End: 2024-02-22
Attending: EMERGENCY MEDICINE
Payer: COMMERCIAL

## 2024-02-22 VITALS
BODY MASS INDEX: 17.27 KG/M2 | SYSTOLIC BLOOD PRESSURE: 104 MMHG | WEIGHT: 107 LBS | OXYGEN SATURATION: 100 % | DIASTOLIC BLOOD PRESSURE: 68 MMHG | RESPIRATION RATE: 16 BRPM | TEMPERATURE: 97.9 F | HEART RATE: 74 BPM

## 2024-02-22 DIAGNOSIS — R10.9 ABDOMINAL PAIN, UNSPECIFIED ABDOMINAL LOCATION: Primary | ICD-10-CM

## 2024-02-22 DIAGNOSIS — R19.7 DIARRHEA, UNSPECIFIED TYPE: ICD-10-CM

## 2024-02-22 DIAGNOSIS — Z87.19 HISTORY OF BLOODY STOOLS: ICD-10-CM

## 2024-02-22 LAB
ALBUMIN SERPL-MCNC: 4 G/DL (ref 3.5–5.2)
ALP SERPL-CCNC: 41 U/L (ref 35–104)
ALT SERPL-CCNC: 9 U/L (ref 5–33)
ANION GAP SERPL CALCULATED.3IONS-SCNC: 9 MMOL/L (ref 7–19)
APTT PPP: 28.4 SEC (ref 26–36.2)
AST SERPL-CCNC: 10 U/L (ref 5–32)
BASOPHILS # BLD: 0 K/UL (ref 0–0.2)
BASOPHILS NFR BLD: 0.2 % (ref 0–1)
BILIRUB SERPL-MCNC: 0.3 MG/DL (ref 0.2–1.2)
BILIRUB UR QL STRIP: NEGATIVE
BUN SERPL-MCNC: 9 MG/DL (ref 6–20)
CALCIUM SERPL-MCNC: 8.5 MG/DL (ref 8.6–10)
CHLORIDE SERPL-SCNC: 112 MMOL/L (ref 98–111)
CLARITY UR: ABNORMAL
CO2 SERPL-SCNC: 20 MMOL/L (ref 22–29)
COLOR UR: YELLOW
CREAT SERPL-MCNC: 0.8 MG/DL (ref 0.5–0.9)
EOSINOPHIL # BLD: 0 K/UL (ref 0–0.6)
EOSINOPHIL NFR BLD: 0.4 % (ref 0–5)
ERYTHROCYTE [DISTWIDTH] IN BLOOD BY AUTOMATED COUNT: 12.1 % (ref 11.5–14.5)
GLUCOSE SERPL-MCNC: 94 MG/DL (ref 74–109)
GLUCOSE UR STRIP.AUTO-MCNC: NEGATIVE MG/DL
HCG SERPL QL: NEGATIVE
HCT VFR BLD AUTO: 38.3 % (ref 37–47)
HGB BLD-MCNC: 12.9 G/DL (ref 12–16)
HGB UR STRIP.AUTO-MCNC: NEGATIVE MG/L
IMM GRANULOCYTES # BLD: 0 K/UL
INR PPP: 1.16 (ref 0.88–1.18)
KETONES UR STRIP.AUTO-MCNC: NEGATIVE MG/DL
LEUKOCYTE ESTERASE UR QL STRIP.AUTO: NEGATIVE
LIPASE SERPL-CCNC: 38 U/L (ref 13–60)
LYMPHOCYTES # BLD: 1.1 K/UL (ref 1.1–4.5)
LYMPHOCYTES NFR BLD: 23.7 % (ref 20–40)
MCH RBC QN AUTO: 31.2 PG (ref 27–31)
MCHC RBC AUTO-ENTMCNC: 33.7 G/DL (ref 33–37)
MCV RBC AUTO: 92.7 FL (ref 81–99)
MONOCYTES # BLD: 0.3 K/UL (ref 0–0.9)
MONOCYTES NFR BLD: 6.3 % (ref 0–10)
NEUTROPHILS # BLD: 3.1 K/UL (ref 1.5–7.5)
NEUTS SEG NFR BLD: 69.2 % (ref 50–65)
NITRITE UR QL STRIP.AUTO: NEGATIVE
PH UR STRIP.AUTO: 8 [PH] (ref 5–8)
PLATELET # BLD AUTO: 129 K/UL (ref 130–400)
PMV BLD AUTO: 9.9 FL (ref 9.4–12.3)
POTASSIUM SERPL-SCNC: 3.4 MMOL/L (ref 3.5–5)
PROT SERPL-MCNC: 6.2 G/DL (ref 6.6–8.7)
PROT UR STRIP.AUTO-MCNC: NEGATIVE MG/DL
PROTHROMBIN TIME: 14.5 SEC (ref 12–14.6)
RBC # BLD AUTO: 4.13 M/UL (ref 4.2–5.4)
SODIUM SERPL-SCNC: 141 MMOL/L (ref 136–145)
SP GR UR STRIP.AUTO: 1.01 (ref 1–1.03)
UROBILINOGEN UR STRIP.AUTO-MCNC: 0.2 E.U./DL
WBC # BLD AUTO: 4.5 K/UL (ref 4.8–10.8)

## 2024-02-22 PROCEDURE — 83690 ASSAY OF LIPASE: CPT

## 2024-02-22 PROCEDURE — 84703 CHORIONIC GONADOTROPIN ASSAY: CPT

## 2024-02-22 PROCEDURE — 85025 COMPLETE CBC W/AUTO DIFF WBC: CPT

## 2024-02-22 PROCEDURE — 99284 EMERGENCY DEPT VISIT MOD MDM: CPT

## 2024-02-22 PROCEDURE — 74176 CT ABD & PELVIS W/O CONTRAST: CPT

## 2024-02-22 PROCEDURE — 2580000003 HC RX 258: Performed by: EMERGENCY MEDICINE

## 2024-02-22 PROCEDURE — 81003 URINALYSIS AUTO W/O SCOPE: CPT

## 2024-02-22 PROCEDURE — 85730 THROMBOPLASTIN TIME PARTIAL: CPT

## 2024-02-22 PROCEDURE — 80053 COMPREHEN METABOLIC PANEL: CPT

## 2024-02-22 PROCEDURE — 36415 COLL VENOUS BLD VENIPUNCTURE: CPT

## 2024-02-22 PROCEDURE — 85610 PROTHROMBIN TIME: CPT

## 2024-02-22 RX ORDER — ONDANSETRON 4 MG/1
4 TABLET, FILM COATED ORAL 3 TIMES DAILY PRN
Qty: 15 TABLET | Refills: 0 | Status: SHIPPED | OUTPATIENT
Start: 2024-02-22

## 2024-02-22 RX ORDER — PANTOPRAZOLE SODIUM 40 MG/1
40 TABLET, DELAYED RELEASE ORAL DAILY
Qty: 30 TABLET | Refills: 1 | Status: SHIPPED | OUTPATIENT
Start: 2024-02-22

## 2024-02-22 RX ORDER — SODIUM CHLORIDE, SODIUM LACTATE, POTASSIUM CHLORIDE, AND CALCIUM CHLORIDE .6; .31; .03; .02 G/100ML; G/100ML; G/100ML; G/100ML
1000 INJECTION, SOLUTION INTRAVENOUS ONCE
Status: COMPLETED | OUTPATIENT
Start: 2024-02-22 | End: 2024-02-22

## 2024-02-22 RX ADMIN — SODIUM CHLORIDE, POTASSIUM CHLORIDE, SODIUM LACTATE AND CALCIUM CHLORIDE 1000 ML: 600; 310; 30; 20 INJECTION, SOLUTION INTRAVENOUS at 01:27

## 2024-02-22 ASSESSMENT — ENCOUNTER SYMPTOMS
SORE THROAT: 0
RECTAL PAIN: 0
SHORTNESS OF BREATH: 0
BLOOD IN STOOL: 1
COUGH: 0
CONSTIPATION: 0
VOMITING: 0
ABDOMINAL PAIN: 1
NAUSEA: 1
RHINORRHEA: 0
DIARRHEA: 1
BACK PAIN: 0
ANAL BLEEDING: 1

## 2024-02-22 NOTE — ED PROVIDER NOTES
[facility] EMERGENCY DEPT  eMERGENCY dEPARTMENT eNCOUnter      Pt Name: Dionne Guzman  MRN: [id number]  Birthdate 1980  Date of evaluation: 2/22/2024  Provider: BENITO LEWIS MD    CHIEF COMPLAINT       Chief Complaint   Patient presents with    Abdominal Pain      Pt complains of R sided flank and abdominal pain. Pt c/o blood in stool says It was kind of dark.  Pt complains of consitent diarrhea. Also states that she had random menstrual bleeding that started today and pt denies having regular menstrual cycles, reports last period was 2 years ago.    Diarrhea         HISTORY OF PRESENT ILLNESS   (Location/Symptom, Timing/Onset,Context/Setting, Quality, Duration, Modifying Factors, Severity)  Note limiting factors.   Dionne Guzman is a 43 y.o. female who presents to the emergency department for abdominal pain and diarrhea.  Patient reports since yesterday she has had some right-sided flank and abdominal discomfort.  Denies any fevers or chills.  Has a history of chronic diarrhea.  States yesterday after a bowel movement noticed that it seemed to be somewhat dark blood but today had some red blood dripping from her rectum.  Denies any rectal pain or hemorrhoids.  She also has had some white vaginal spotting and last period was 2 years ago.  Denies any UTI symptoms.  History of thrombocytopenia followed by hematology.  Has had prior cholecystectomy denies any other abdominal surgeries.  Patient declined any pain or nausea medication at this point.  Last colonoscopy 2022 per patient and no obvious abnormalities she is aware of.    The history is provided by the patient.       NursingNotes were reviewed.    REVIEW OF SYSTEMS    (2-9 systems for level 4, 10 or more for level 5)     Review of Systems   Constitutional:  Negative for chills and fever.   HENT:  Negative for rhinorrhea and sore throat.    Respiratory:  Negative for cough and shortness of breath.    Cardiovascular:  Negative for chest pain and leg swelling.

## 2024-02-22 NOTE — ED NOTES
Pt placed in gown, pt given blanket. Pt requested sprite to drink, this RN advised pt that she cannot have anything to eat or drink until she has been evaluated by the physician.

## 2024-02-28 ENCOUNTER — OFFICE VISIT (OUTPATIENT)
Dept: GASTROENTEROLOGY | Age: 44
End: 2024-02-28
Payer: COMMERCIAL

## 2024-02-28 VITALS
BODY MASS INDEX: 18.64 KG/M2 | WEIGHT: 116 LBS | DIASTOLIC BLOOD PRESSURE: 60 MMHG | OXYGEN SATURATION: 100 % | HEART RATE: 88 BPM | HEIGHT: 66 IN | SYSTOLIC BLOOD PRESSURE: 108 MMHG

## 2024-02-28 DIAGNOSIS — R13.10 DYSPHAGIA, UNSPECIFIED TYPE: ICD-10-CM

## 2024-02-28 DIAGNOSIS — K21.9 CHRONIC GERD: ICD-10-CM

## 2024-02-28 DIAGNOSIS — R10.31 RIGHT LOWER QUADRANT ABDOMINAL PAIN: Primary | ICD-10-CM

## 2024-02-28 DIAGNOSIS — R19.7 INTERMITTENT DIARRHEA: ICD-10-CM

## 2024-02-28 DIAGNOSIS — K62.5 BRBPR (BRIGHT RED BLOOD PER RECTUM): ICD-10-CM

## 2024-02-28 PROCEDURE — 99204 OFFICE O/P NEW MOD 45 MIN: CPT | Performed by: NURSE PRACTITIONER

## 2024-02-28 ASSESSMENT — ENCOUNTER SYMPTOMS
BLOOD IN STOOL: 1
CONSTIPATION: 0
NAUSEA: 0
CHOKING: 0
ANAL BLEEDING: 1
VOMITING: 0
ABDOMINAL PAIN: 1
DIARRHEA: 1
RECTAL PAIN: 0
COUGH: 0
ABDOMINAL DISTENTION: 0
TROUBLE SWALLOWING: 1
SHORTNESS OF BREATH: 0

## 2024-02-28 NOTE — PROGRESS NOTES
respiratory distress.      Breath sounds: Normal breath sounds. No wheezing or rales.   Abdominal:      General: Bowel sounds are normal. There is no distension.      Palpations: Abdomen is soft. There is no mass.      Tenderness: There is generalized abdominal tenderness. There is no guarding or rebound.   Musculoskeletal:         General: Normal range of motion.      Cervical back: Normal range of motion and neck supple.   Skin:     General: Skin is warm and dry.      Coloration: Skin is not pale.   Neurological:      Mental Status: She is alert and oriented to person, place, and time.   Psychiatric:         Behavior: Behavior normal.

## 2024-02-29 ENCOUNTER — ANESTHESIA EVENT (OUTPATIENT)
Dept: OPERATING ROOM | Age: 44
End: 2024-02-29

## 2024-03-01 ENCOUNTER — TELEPHONE (OUTPATIENT)
Dept: GASTROENTEROLOGY | Age: 44
End: 2024-03-01

## 2024-03-01 NOTE — PROGRESS NOTES
Clinic Documentation      Education Provided:  [x] Review of Zach Mcleod  [x] Agreement Review  [x] PEG Score Calculated [x] PHQ Score Calculated [x] ORT Score Calculated    [] Compliance Issues Discussed [] Cognitive Behavior Needs [x] Exercise [] Review of Test [] Financial Issues  [x] Tobacco/Alcohol Use Reviewed [x] Teaching [x] New Patient [] Picture Obtained    Physician Plan:  [] Outgoing Referral  [] Pharmacy Consult  [] Test Ordered [x] Prescription Ordered/Changed   [] Obtained Test Results / Consult Notes        Complete if patient is withholding blood thinner for procedure     Blood Thinner Patient is currently taking:      [] Plavix (Hold for 7 days)  [] Aspirin (Hold for 5 days)     [] Pletal (Hold for 2 days)  [] Pradaxa (Hold for 3 days)    [] Effient (Hold for 7 days)  [] Xarelto (Hold for 2 days)    [] Eliquis (Hold for 2 days)  [] Brilinta (Hold for 7 days)    [] Coumadin (Hold for 5 days) - (INR needs to be drawn the day prior to procedure- INR < 2.0)    [] Aggrenox (Hold for 7 days)        [] Patient will stop medication on their own.    [] Blood Thinner Form Faxed for approval to hold.    Provider form faxed to:     Assessment Completed by:  Electronically signed by Hillary Melvin RN on 10/20/2022 at 11:24 AM Negative

## 2024-03-01 NOTE — TELEPHONE ENCOUNTER
Called patient to remind them of their [facility] to arrive at 630 AM     NO ANS / LM

## 2024-03-04 ENCOUNTER — APPOINTMENT (OUTPATIENT)
Dept: OPERATING ROOM | Age: 44
End: 2024-03-04
Attending: INTERNAL MEDICINE

## 2024-03-04 ENCOUNTER — ANESTHESIA (OUTPATIENT)
Dept: OPERATING ROOM | Age: 44
End: 2024-03-04

## 2024-03-04 ENCOUNTER — HOSPITAL ENCOUNTER (OUTPATIENT)
Age: 44
Setting detail: OUTPATIENT SURGERY
Discharge: HOME | End: 2024-03-04
Attending: INTERNAL MEDICINE | Admitting: INTERNAL MEDICINE

## 2024-03-04 ENCOUNTER — HOSPITAL ENCOUNTER (OUTPATIENT)
Age: 44
Setting detail: SPECIMEN
Discharge: HOME | End: 2024-03-04
Payer: COMMERCIAL

## 2024-03-04 VITALS
SYSTOLIC BLOOD PRESSURE: 85 MMHG | RESPIRATION RATE: 16 BRPM | WEIGHT: 110 LBS | HEART RATE: 78 BPM | HEIGHT: 66 IN | TEMPERATURE: 97.7 F | OXYGEN SATURATION: 100 % | DIASTOLIC BLOOD PRESSURE: 54 MMHG | BODY MASS INDEX: 17.68 KG/M2

## 2024-03-04 LAB
CONTROL: NORMAL
PREGNANCY TEST URINE, POC: NORMAL

## 2024-03-04 PROCEDURE — 88305 TISSUE EXAM BY PATHOLOGIST: CPT

## 2024-03-04 PROCEDURE — 88342 IMHCHEM/IMCYTCHM 1ST ANTB: CPT

## 2024-03-04 PROCEDURE — 43239 EGD BIOPSY SINGLE/MULTIPLE: CPT

## 2024-03-04 PROCEDURE — 45380 COLONOSCOPY AND BIOPSY: CPT

## 2024-03-04 RX ORDER — ONDANSETRON 2 MG/ML
INJECTION INTRAMUSCULAR; INTRAVENOUS PRN
Status: DISCONTINUED | OUTPATIENT
Start: 2024-03-04 | End: 2024-03-04 | Stop reason: SDUPTHER

## 2024-03-04 RX ORDER — MIDAZOLAM HYDROCHLORIDE 1 MG/ML
INJECTION INTRAMUSCULAR; INTRAVENOUS PRN
Status: DISCONTINUED | OUTPATIENT
Start: 2024-03-04 | End: 2024-03-04 | Stop reason: SDUPTHER

## 2024-03-04 RX ORDER — LIDOCAINE HYDROCHLORIDE 10 MG/ML
INJECTION, SOLUTION INFILTRATION; PERINEURAL PRN
Status: DISCONTINUED | OUTPATIENT
Start: 2024-03-04 | End: 2024-03-04 | Stop reason: SDUPTHER

## 2024-03-04 RX ORDER — PROPOFOL 10 MG/ML
INJECTION, EMULSION INTRAVENOUS PRN
Status: DISCONTINUED | OUTPATIENT
Start: 2024-03-04 | End: 2024-03-04 | Stop reason: SDUPTHER

## 2024-03-04 RX ORDER — SODIUM CHLORIDE, SODIUM LACTATE, POTASSIUM CHLORIDE, CALCIUM CHLORIDE 600; 310; 30; 20 MG/100ML; MG/100ML; MG/100ML; MG/100ML
INJECTION, SOLUTION INTRAVENOUS CONTINUOUS
Status: DISCONTINUED | OUTPATIENT
Start: 2024-03-04 | End: 2024-03-04 | Stop reason: HOSPADM

## 2024-03-04 RX ADMIN — ONDANSETRON 4 MG: 2 INJECTION INTRAMUSCULAR; INTRAVENOUS at 07:49

## 2024-03-04 RX ADMIN — SODIUM CHLORIDE, SODIUM LACTATE, POTASSIUM CHLORIDE, CALCIUM CHLORIDE: 600; 310; 30; 20 INJECTION, SOLUTION INTRAVENOUS at 06:49

## 2024-03-04 RX ADMIN — MIDAZOLAM HYDROCHLORIDE 2 MG: 1 INJECTION INTRAMUSCULAR; INTRAVENOUS at 07:48

## 2024-03-04 RX ADMIN — PROPOFOL 300 MG: 10 INJECTION, EMULSION INTRAVENOUS at 07:50

## 2024-03-04 RX ADMIN — LIDOCAINE HYDROCHLORIDE 40 MG: 10 INJECTION, SOLUTION INFILTRATION; PERINEURAL at 07:50

## 2024-03-04 NOTE — DISCHARGE INSTRUCTIONS
EGD RECOMMENDATIONS:    1.  Await path results  2.  Continue PPI daily   3.  Minimize NSAID use    Recommendations:  1. Repeat colonoscopy: pending pathology - at the age of 52 for CRC screening.    POST-OP ORDERS: ENDOSCOPY & COLONOSCOPY:    1. Rest today.    2. DO NOT eat or drink until wide awake; eat your usual diet today in moderate amount only.    3. DO NOT drive today.    4. Call physician if you have severe pain, vomiting, fever, rectal bleeding or black bowel movements.    5.  If a biopsy was taken or a polyp removed, you should expect to hear results in about 21 days.  If you have heard nothing from your physician by then, call the office for results.    6.  Discharge home when patient awake, vitals signs stable and tolerating liquids.    7. Call with questions or concerns [phone].    NSAIDS (Non-steroidal Anti-Inflammatory)    You have been directed by your physician to avoid any NSAID's; the following medications are a list of those to avoid. If you think that you are taking any NSAID's notify your physician.     Over The Counter    Advil                      Motrin  Nuprin                   Ibuprofen  Midol                     Aleve  Naproxen              Orudis  Aspirin                   Caitlin-Seymour    Prescriptions and Generics    Cataflam              Relafen  Voltaren               Clinoril  Indocin                 Naproxen  Arthrotec              Lodine  Daypro                 Nalfon  Toradol                Ansaid  Feldene               Meclofenamate  Fenoprofen          Ponstel  Mobic                   Celebrex  Vioxx

## 2024-03-04 NOTE — OP NOTE
Endoscopic Procedure Note    Patient: Dionne Guzman : 1980  [facility] Rec#: [id number] Acc#: [id number]     Primary Care Provider Marla Cortez MD  Referring Provider: ALVERTO Bullock    Endoscopist: Puma Easton MD    Date of Procedure:  3/4/2024    Procedure:   1. EGD with biopsy    Indications:   1. Reflux   2. Dysphagia       Anesthesia:  Sedation was administered by anesthesia who monitored the patient during the procedure.    Estimated Blood Loss: minimal    Procedure:   After reviewing the patient's chart and obtaining informed consent, the patient was placed in the left lateral decubitus position.  A forward-viewing Olympus endoscope was lubricated and inserted through the mouth into the oropharynx. Under direct visualization, the upper esophagus was intubated. The scope was advanced to the level of the third portion of duodenum. Scope was slowly withdrawn with careful inspection of the mucosal surfaces. The scope was retroflexed for inspection of the gastric fundus and incisura. Findings and maneuvers are listed in impression below. The patient tolerated the procedure well. The scope was removed. There were no immediate complications.    Findings:   Esophagus: normal  There is no hiatal hernia present.      Stomach:  Abnormal: Mild mucosal changes suggestive of gastritis noted -  Gastric biopsies were taken from the antrum and body to rule out Helicobacter pylori infection.      Duodenum: normal: biopsies obtained to rule out celiac sprue.      IMPRESSION:  1. S/p duodenal and gastric biopsies obtained as above.     RECOMMENDATIONS:    1.  Await path results  2.  Continue PPI daily   3.  Minimize NSAID use    The results were discussed with the patient and family.  A copy of the images obtained were given to the patient.     Joy LEVIN am scribing for and in the presence of Dr. Puma Easton MD.  Electronically signed by Joy Martinez RN on 3/4/2024 at 7:16 AM    OLLIE

## 2024-03-04 NOTE — OP NOTE
Patient: Dionne Guzman : 1980  [facility] Rec#: [id number] Acc#: [id number]   Primary Care Provider Marla Cortez MD    Date of Procedure:  3/4/2024    Endoscopist: Puma Easton MD    Referring Provider: Marla Cortez MD, ALVERTO Bullock    Operation Performed: Colonoscopy with biopsy    Indications: RLQ abdominal pain, rectal bleeding, diarrhea     Anesthesia:  Sedation was administered by anesthesia who monitored the patient during the procedure.    I met with Dionne Guzman prior to procedure. We discussed the procedure itself, and I have discussed the risks of endoscopy (including-- but not limited to-- pain, discomfort, bleeding potentially requiring second endoscopic procedure and/or blood transfusion, organ perforation requiring operative repair, damage to organs near the colon, infection, aspiration, cardiopulmonary/allergic reaction), benefits, indications to endoscopy. Additionally, we discussed options other than colonoscopy. The patient expressed understanding. All questions answered. The patient decided to proceed with the procedure.  Signed informed consent was placed on the chart.    Blood Loss: minimal    Withdrawal time: n/a  Bowel Prep: adequate     Complications: no immediate complications    DESCRIPTION OF PROCEDURE:     A time out was performed. After written informed consent was obtained, the patient was placed in the left lateral position.     The perianal area was inspected, and a digital rectal exam was performed. A rectal exam was performed: normal tone, no palpable lesions. At this point, a forward viewing Olympus colonoscope was inserted into the anus and carefully advanced to the cecum.  The cecum was identified by the ileocecal valve and the appendiceal orifice. The colonoscope was then slowly withdrawn with careful inspection of the mucosa in a linear and circumferential fashion. The scope was retroflexed in the rectum. Suction was utilized during the

## 2024-03-04 NOTE — ANESTHESIA PRE PROCEDURE
MARROW BIOPSY N/A 1/10/2024    BONE MARROW ASPIRATION BIOPSY performed by Odalys Winston APRN at [facility] OR   • BREAST ENHANCEMENT SURGERY     • CHOLECYSTECTOMY     • COLONOSCOPY  2005    \"NORMAL\" PER PT RECALL   • [place]      [place] \" normal\" per pt recall   • SC COLONOSCOPY FLX DX W/COLLJ SPEC WHEN PFRMD N/A 2018    Dr Montes-normal, 14 yr (age 52) recall   • SC EGD TRANSORAL BIOPSY SINGLE/MULTIPLE N/A 2018    Dr Montes (-) Cathy, (+) Barrettt's, (-) dysplasia--1st dx--1 yr recall   • TONSILLECTOMY AND ADENOIDECTOMY     • UPPER GASTROINTESTINAL ENDOSCOPY      \"NORMAL\" PER PT RECALL   • UPPER GASTROINTESTINAL ENDOSCOPY      [place] \"normal\" per pt recall   • UPPER GASTROINTESTINAL ENDOSCOPY N/A 2020    Dr Shelton-NORMAL exam,  NEG EoE, Neg Barretts, no recall       Social History:    Social History     Tobacco Use   • Smoking status: Former     Current packs/day: 0.00     Average packs/day: 0.5 packs/day for 20.0 years (10.0 ttl pk-yrs)     Types: Cigarettes     Start date:      Quit date: 2018     Years since quittin.1   • Smokeless tobacco: Never   Substance Use Topics   • Alcohol use: Not Currently     Comment: occ                                Counseling given: Not Answered      Vital Signs (Current):   Vitals:    24 0642   BP: (!) 90/59   Pulse: 74   Resp: 18   Temp: 97.9 °F (36.6 °C)   TempSrc: Temporal   SpO2: 99%   Weight: 49.9 kg (110 lb)   Height: 1.676 m (5' 6\")                                              BP Readings from Last 3 Encounters:   24 (!) 90/59   24 108/60   24 104/68       NPO Status: Time of last liquid consumption: 130                        Time of last solid consumption:                         Date of last liquid consumption: 24                        Date of last solid food consumption: 24    BMI:   Wt Readings from Last 3 Encounters:   24 49.9 kg (110 lb)   24 52.6 kg (116

## 2024-03-04 NOTE — H&P
services described in this documentation as scribed by Joy Razo, and it appears accurate and complete.     Puma Easton MD  3/4/2024

## 2024-03-04 NOTE — ANESTHESIA POSTPROCEDURE EVALUATION
Department of Anesthesiology  Postprocedure Note    Patient: Dionne Guzman  MRN: [id number]  YOB: 1980  Date of evaluation: 3/4/2024    Procedure Summary       Date: 03/04/24 Room / Location: [place] [address] / [facility]    Anesthesia Start: 0738 Anesthesia Stop:     Procedures:       ESOPHAGOGASTRODUODENOSCOPY BIOPSY (Esophagus)      COLONOSCOPY BIOPSY (Abdomen) Diagnosis:       Chronic GERD      Dysphagia, unspecified type      Chronic RLQ pain      BRBPR (bright red blood per rectum)      (Chronic GERD [K21.9])      (Dysphagia, unspecified type [R13.10])      (Chronic RLQ pain [R10.31, G89.29])      (BRBPR (bright red blood per rectum) [K62.5])    Surgeons: Puma Easton MD Responsible Provider: Nedra Gutierrez APRN - CRNA    Anesthesia Type: general, TIVA ASA Status: 2            Anesthesia Type: No value filed.    Mk Phase I:      Mk Phase II:      Anesthesia Post Evaluation    Patient location during evaluation: bedside  Patient participation: complete - patient participated  Level of consciousness: sleepy but conscious  Pain score: 0  Airway patency: patent  Nausea & Vomiting: no nausea and no vomiting  Cardiovascular status: hemodynamically stable and blood pressure returned to baseline  Respiratory status: acceptable and nasal cannula  Hydration status: stable  Pain management: adequate    No notable events documented.

## 2024-03-05 DIAGNOSIS — Z80.0 FAMILY HISTORY OF PANCREATIC CANCER: ICD-10-CM

## 2024-03-07 ENCOUNTER — OFFICE VISIT (OUTPATIENT)
Dept: NEUROSURGERY | Facility: CLINIC | Age: 44
End: 2024-03-07
Payer: COMMERCIAL

## 2024-03-07 VITALS — HEIGHT: 66 IN | WEIGHT: 113 LBS | BODY MASS INDEX: 18.16 KG/M2

## 2024-03-07 DIAGNOSIS — G89.29 CHRONIC NECK PAIN: ICD-10-CM

## 2024-03-07 DIAGNOSIS — M50.20 CERVICAL DISC HERNIATION: ICD-10-CM

## 2024-03-07 DIAGNOSIS — R20.0 BILATERAL HAND NUMBNESS: ICD-10-CM

## 2024-03-07 DIAGNOSIS — Z78.9 NONSMOKER: ICD-10-CM

## 2024-03-07 DIAGNOSIS — M54.2 CHRONIC NECK PAIN: ICD-10-CM

## 2024-03-07 PROCEDURE — 99204 OFFICE O/P NEW MOD 45 MIN: CPT | Performed by: NURSE PRACTITIONER

## 2024-03-07 PROCEDURE — 1159F MED LIST DOCD IN RCRD: CPT | Performed by: NURSE PRACTITIONER

## 2024-03-07 PROCEDURE — 1160F RVW MEDS BY RX/DR IN RCRD: CPT | Performed by: NURSE PRACTITIONER

## 2024-03-07 RX ORDER — CLONAZEPAM 1 MG/1
1 TABLET ORAL AS NEEDED
COMMUNITY
Start: 2024-02-13 | End: 2024-03-15

## 2024-03-07 RX ORDER — POTASSIUM CHLORIDE 20 MEQ/1
20 TABLET, EXTENDED RELEASE ORAL DAILY
COMMUNITY
Start: 2024-01-29

## 2024-03-07 RX ORDER — CYCLOBENZAPRINE HCL 10 MG
1 TABLET ORAL DAILY
COMMUNITY
Start: 2023-11-06

## 2024-03-07 RX ORDER — ONDANSETRON 4 MG/1
4 TABLET, FILM COATED ORAL AS NEEDED
COMMUNITY
Start: 2024-02-22

## 2024-03-07 RX ORDER — METHOCARBAMOL 750 MG/1
750 TABLET, FILM COATED ORAL 3 TIMES DAILY
COMMUNITY

## 2024-03-07 RX ORDER — SUMATRIPTAN 50 MG/1
1 TABLET, FILM COATED ORAL ONCE AS NEEDED
COMMUNITY
Start: 2023-12-26

## 2024-03-07 RX ORDER — DEXTROAMPHETAMINE SACCHARATE, AMPHETAMINE ASPARTATE MONOHYDRATE, DEXTROAMPHETAMINE SULFATE AND AMPHETAMINE SULFATE 7.5; 7.5; 7.5; 7.5 MG/1; MG/1; MG/1; MG/1
1 CAPSULE, EXTENDED RELEASE ORAL DAILY
COMMUNITY
Start: 2024-02-16 | End: 2024-03-18

## 2024-03-07 RX ORDER — PANTOPRAZOLE SODIUM 40 MG/1
1 TABLET, DELAYED RELEASE ORAL DAILY
COMMUNITY
Start: 2024-02-22

## 2024-03-07 RX ORDER — ONDANSETRON HYDROCHLORIDE 8 MG/1
1 TABLET, FILM COATED ORAL EVERY 8 HOURS PRN
COMMUNITY
Start: 2023-12-04

## 2024-03-07 RX ORDER — NORETHINDRONE ACETATE AND ETHINYL ESTRADIOL 1.5-30(21)
1 KIT ORAL DAILY
COMMUNITY
Start: 2023-12-04

## 2024-03-07 RX ORDER — BUTALBITAL, ACETAMINOPHEN AND CAFFEINE 50; 325; 40 MG/1; MG/1; MG/1
1 TABLET ORAL AS NEEDED
COMMUNITY
Start: 2024-02-06

## 2024-03-07 NOTE — PROGRESS NOTES
Chief complaint:   Chief Complaint   Patient presents with    Neck Pain     Pt here for constant neck pain with numbness and tingling in bilateral arms and hands. Pt states she has not had any physical therapy. Pt has seen a chiropractor last visit 01/2024. Pt states she is currently in pain mgmt.       Subjective     HPI: This is a 43-year-old female patient who was referred to us by HARMONY Licea for neck pain and bilateral hand numbness and tingling.  She is here to be evaluated today.  The patient states that she has had neck issues for several years.  The symptoms do seem to be progressively getting worse.  Currently the pain in her neck is constant.  Nothing in particular makes it worse.  It is better with massage.  She states the pain will radiate out to her shoulders bilaterally.  She is not really giving of true radicular fashion into her arms.  She does complain of some pain around her right elbow and then also does have bilateral hand numbness and tingling with the right being more prominent than the left.  She says this is worse at nighttime and can wake her up from sleeping at night.  Denies any bowel or bladder incontinence.  She is not done any recent physical therapy.  She has attempted chiropractic care.  She does get trigger point injections at [facility] pain management.  She does take Fioricet for her headaches as well.  She is right-hand dominant.  She currently is not working.  She is single.  Denies any tobacco or illicit drug use.  She does drink alcohol occasionally.  Rates her pain on a scale 0-10 at an 8.    Review of Systems   Constitutional:  Positive for activity change, appetite change and unexpected weight change.   Gastrointestinal:  Positive for abdominal pain, anal bleeding, blood in stool, diarrhea and nausea.   Endocrine: Positive for cold intolerance and polydipsia.   Genitourinary:  Positive for pelvic pain.   Musculoskeletal:  Positive for arthralgias, myalgias and  neck pain.   Allergic/Immunologic: Positive for environmental allergies and food allergies.   Neurological:  Positive for weakness, numbness and headaches.   Hematological:  Bruises/bleeds easily.   Psychiatric/Behavioral:  Positive for decreased concentration and sleep disturbance. The patient is nervous/anxious and is hyperactive.    All other systems reviewed and are negative.       Past Medical History:   Diagnosis Date    Anemia     Arteriovenous malformation     Bleeding disorder     Brain concussion     Cervical disc disorder     Claustrophobia     Clotting disorder     Low back pain     Lumbosacral disc disease     Migraine     Thoracic disc disorder      Past Surgical History:   Procedure Laterality Date    BREAST BIOPSY      CHOLECYSTECTOMY      DILATATION AND CURETTAGE      TONSILLECTOMY      TRIGGER POINT INJECTION       Family History   Problem Relation Age of Onset    Pancreatic cancer Paternal Grandmother     Ovarian cancer Maternal Grandmother     Breast cancer Maternal Grandmother     Cancer Maternal Grandmother     Early death Maternal Grandmother     Breast cancer Other     Hypertension Father     Alcohol abuse Father     Depression Father     Diabetes Father     Drug abuse Father     Heart disease Father     Hyperlipidemia Father     Early death Mother     Anxiety disorder Sister     Stroke Paternal Grandfather     Arthritis Paternal Uncle     Hypertension Paternal Uncle     Uterine cancer Neg Hx     Colon cancer Neg Hx     Melanoma Neg Hx      Social History     Tobacco Use    Smoking status: Former     Current packs/day: 0.00     Types: Cigarettes     Quit date: 2012     Years since quittin.1    Smokeless tobacco: Never    Tobacco comments:     quit years ago   Vaping Use    Vaping status: Never Used   Substance Use Topics    Alcohol use: Yes     Alcohol/week: 5.0 standard drinks of alcohol     Types: 5 Shots of liquor per week     Comment: Socially    Drug use: No     (Not in a  "hospital admission)    Allergies:  Iodinated contrast media, Red dye, Shellfish-derived products, Daypro [oxaprozin], Percocet [oxycodone-acetaminophen], Shrimp flavor, Tyloxapol, Hydrocodone-acetaminophen, and Oxycodone-acetaminophen    Objective      Vital Signs  Ht 167.6 cm (66\")   Wt 51.3 kg (113 lb)   BMI 18.24 kg/m²     Physical Exam  Constitutional:       Appearance: Normal appearance. She is well-developed.   HENT:      Head: Normocephalic.   Eyes:      General: Lids are normal.      Extraocular Movements: EOM normal.      Conjunctiva/sclera: Conjunctivae normal.      Pupils: Pupils are equal, round, and reactive to light.   Pulmonary:      Effort: Pulmonary effort is normal.      Breath sounds: Normal breath sounds.   Musculoskeletal:         General: Normal range of motion.      Cervical back: Normal range of motion.   Skin:     General: Skin is warm.   Neurological:      Mental Status: She is alert and oriented to person, place, and time.      GCS: GCS eye subscore is 4. GCS verbal subscore is 5. GCS motor subscore is 6.      Cranial Nerves: No cranial nerve deficit.      Sensory: No sensory deficit.      Motor: Motor strength is normal.     Gait: Gait is intact.      Deep Tendon Reflexes: Reflexes are normal and symmetric. Reflexes normal.   Psychiatric:         Speech: Speech normal.         Behavior: Behavior normal.         Thought Content: Thought content normal.         Neurologic Exam     Mental Status   Oriented to person, place, and time.   Attention: normal. Concentration: normal.   Speech: speech is normal   Level of consciousness: alert  Normal comprehension.     Cranial Nerves     CN II   Visual fields full to confrontation.     CN III, IV, VI   Pupils are equal, round, and reactive to light.  Extraocular motions are normal.     CN V   Facial sensation intact.     CN VII   Facial expression full, symmetric.     CN VIII   CN VIII normal.     CN IX, X   CN IX normal.   CN X normal.     CN XI "   CN XI normal.     CN XII   CN XII normal.     Motor Exam   Muscle bulk: normal    Strength   Strength 5/5 throughout.     Sensory Exam   Light touch normal.     Gait, Coordination, and Reflexes     Gait  Gait: normal    Reflexes   Reflexes 2+ except as noted.       Imaging review: MRI of the cervical spine that was done on November 1, 2023 shows a disc herniation at C5-6 off to the right causing pressure on the exiting nerve root on the right.  No fracture visualized.  No cord signal change.    C5-6      Assessment/Plan: The patient is complaining of neck pain.  I am to have her go for x-rays of the cervical spine to include standing flexion and extension  For first line conservative care of cervical pain, I would like to send Ms. Escalona for a dedicated course of physician directed physical therapy consisting of 2-3 times a week for 4-6 weeks.    In regards to the numbness and tingling in her hands I am going to have her go for an EMG to rule out carpal tunnel syndrome.  Should she do have carpal tunnel syndrome we can look into ordering cock-up splints to try and help with the numbness that she is experiencing    Return for reassessment with Dr. Carmichael    I advised the patient to call and return sooner for new or worsening complaints of weakness, paresthesias, gait disturbances, or any additional concerns.  Treatment options discussed in detail with Katt and the patient voiced understanding.  Ms. Escalona agrees with this plan of care.     Patient is a nonsmoker  The patient's Body mass index is 18.24 kg/m².. BMI is below normal parameters. Recommendations include: Education material    Diagnoses and all orders for this visit:    1. Chronic neck pain  -     XR Spine Cervical Complete With Obli Flex Ext  -     Ambulatory Referral to Physical Therapy Evaluate and treat, Neuro; Strengthening, ROM, Stretching; Full weight bearing    2. Cervical disc herniation  -     XR Spine Cervical Complete With Obli Flex Ext  -      Ambulatory Referral to Physical Therapy Evaluate and treat, Neuro; Strengthening, ROM, Stretching; Full weight bearing    3. Bilateral hand numbness  -     EMG & Nerve Conduction Test; Future    4. Body mass index (BMI) of 19.9 or less in adult    5. Nonsmoker          I discussed the patients findings and my recommendations with patient    Harlan Baldwin, APRN  03/07/24  13:33 CST

## 2024-03-07 NOTE — PATIENT INSTRUCTIONS
High-Protein and High-Calorie Diet  Eating high-protein and high-calorie foods can help you to gain weight, heal after an injury, and recover after an illness or surgery. The specific amount of daily protein and calories you need depends on:  Your body weight.  The reason this diet is recommended for you.  Generally, a high-protein, high-calorie diet involves:  Eating 250-500 extra calories each day.  Making sure that you get enough of your daily calories from protein. Ask your health care provider how many of your calories should come from protein.  Talk with a health care provider or a dietitian about how much protein and how many calories you need each day. Follow the diet as directed by your health care provider.  What are tips for following this plan?    Reading food labels  Check the nutrition facts label for calories, grams of fat and protein. Items with more than 4 grams of protein are high-protein foods.  Preparing meals  Add whole milk, half-and-half, or heavy cream to cereal, pudding, soup, or hot cocoa.  Add whole milk to instant breakfast drinks.  Add peanut butter to oatmeal or smoothies.  Add powdered milk to baked goods, smoothies, or milkshakes.  Add powdered milk, cream, or butter to mashed potatoes.  Add cheese to cooked vegetables.  Make whole-milk yogurt parfaits. Top them with granola, fruit, or nuts.  Add cottage cheese to fruit.  Add avocado, cheese, or both to sandwiches or salads. Add avocado to smoothies.  Add meat, poultry, or seafood to rice, pasta, casseroles, salads, and soups.  Use mayonnaise when making egg salad, chicken salad, or tuna salad.  Use peanut butter as a dip for fruits and vegetables or as a topping for pretzels, celery, or crackers.  Add beans to casseroles, dips, and spreads.  Add pureed beans to sauces and soups.  Replace calorie-free drinks with calorie-containing drinks, such as milk and fruit juice.  Replace water with milk or heavy cream when making foods such as  oatmeal, pudding, or cocoa.  Add oil or butter to cooked vegetables and grains.  Add cream cheese to sandwiches or as a topping on crackers and bread.  Make cream-based pastas and soups.  General information  Ask your health care provider if you should take a nutritional supplement.  Try to eat six small meals each day instead of three large meals. A general goal is to eat every 2 to 3 hours.  Eat a balanced diet. In each meal, include one food that is high in protein and one food with fat in it.  Keep nutritious snacks available, such as nuts, trail mixes, dried fruit, and yogurt.  If you have kidney disease or diabetes, talk with your health care provider about how much protein is safe for you. Too much protein may put extra stress on your kidneys.  Drink your calories. Choose high-calorie drinks and have them after your meals.  Consider setting a timer to remind you to eat. You will want to eat even if you do not feel very hungry.  What high-protein foods should I eat?    Vegetables  Soybeans. Peas.  Grains  Quinoa. Bulgur wheat. Buckwheat.  Meats and other proteins  Beef, pork, and poultry. Fish and seafood. Eggs. Tofu. Textured vegetable protein (TVP). Peanut butter. Nuts and seeds. Dried beans. Protein powders. Hummus.  Dairy  Whole milk. Whole-milk yogurt. Powdered milk. Cheese. Cottage Cheese. Eggnog.  Beverages  High-protein supplement drinks. Soy milk.  Other foods  Protein bars.  The items listed above may not be a complete list of foods and beverages you can eat and drink. Contact a dietitian for more information.  What high-calorie foods should I eat?  Fruits  Dried fruit. Fruit leather. Canned fruit in syrup. Fruit juice. Avocado.  Vegetables  Vegetables cooked in oil or butter. Fried potatoes.  Grains  Pasta. Quick breads. Muffins. Pancakes. Ready-to-eat cereal.  Meats and other proteins  Peanut butter. Nuts and seeds.  Dairy  Heavy cream. Whipped cream. Cream cheese. Sour cream. Ice cream. Custard.  Pudding. Whole milk dairy products.  Beverages  Meal-replacement beverages. Nutrition shakes. Fruit juice.  Seasonings and condiments  Salad dressing. Mayonnaise. Mandeep sauce. Fruit preserves or jelly. Honey. Syrup.  Sweets and desserts  Cake. Cookies. Pie. Pastries. Candy bars. Chocolate.  Fats and oils  Butter or margarine. Oil. Gravy.  Other foods  Meal-replacement bars.  The items listed above may not be a complete list of foods and beverages you can eat and drink. Contact a dietitian for more information.  Summary  A high-protein, high-calorie diet can help you gain weight or heal faster after an injury, illness, or surgery.  To increase your protein and calories, add ingredients such as whole milk, peanut butter, cheese, beans, meat, or seafood to meal items.  To get enough extra calories each day, include high-calorie foods and beverages at each meal.  Adding a high-calorie drink or shake can be an easy way to help you get enough calories each day. Talk with your healthcare provider or dietitian about the best options for you.  This information is not intended to replace advice given to you by your health care provider. Make sure you discuss any questions you have with your health care provider.  Document Revised: 11/21/2021 Document Reviewed: 11/21/2021  Elsevier Patient Education © 2023 [employer]

## 2024-03-11 DIAGNOSIS — F41.9 ANXIETY: ICD-10-CM

## 2024-03-11 RX ORDER — CLONAZEPAM 1 MG/1
TABLET ORAL
Qty: 45 TABLET | Refills: 0 | Status: SHIPPED | OUTPATIENT
Start: 2024-03-11 | End: 2024-04-11

## 2024-03-11 NOTE — TELEPHONE ENCOUNTER
Provider needs to review PDMP    PDMP Monitoring:    Last PDMP Greg as Reviewed ([place]):  Review User Review Instant Review Result   MELVINA CASTRO 12/15/2023  4:34 PM Reviewed PDMP [1]     Urine Drug Screenings (1 yr)       POCT Rapid Drug Screen  Collected: 8/9/2023  2:08 PM (Final result)                  Medication Contract and Consent for Opioid Use Documents Filed        No documents found

## 2024-03-18 DIAGNOSIS — F90.9 ATTENTION DEFICIT HYPERACTIVITY DISORDER (ADHD), UNSPECIFIED ADHD TYPE: ICD-10-CM

## 2024-03-18 DIAGNOSIS — R51.9 ACUTE NONINTRACTABLE HEADACHE, UNSPECIFIED HEADACHE TYPE: ICD-10-CM

## 2024-03-19 RX ORDER — DEXTROAMPHETAMINE SACCHARATE, AMPHETAMINE ASPARTATE MONOHYDRATE, DEXTROAMPHETAMINE SULFATE AND AMPHETAMINE SULFATE 7.5; 7.5; 7.5; 7.5 MG/1; MG/1; MG/1; MG/1
30 CAPSULE, EXTENDED RELEASE ORAL DAILY
Qty: 30 CAPSULE | Refills: 0 | Status: SHIPPED | OUTPATIENT
Start: 2024-03-19 | End: 2024-04-18

## 2024-03-19 RX ORDER — BUTALBITAL, ACETAMINOPHEN AND CAFFEINE 50; 325; 40 MG/1; MG/1; MG/1
1 TABLET ORAL EVERY 6 HOURS PRN
Qty: 45 TABLET | Refills: 0 | Status: SHIPPED | OUTPATIENT
Start: 2024-03-19

## 2024-03-19 NOTE — TELEPHONE ENCOUNTER
Dionne Guzman called to request a refill on her medication.      Last office visit : 9/25/2023   Next office visit : 3/18/2024     Last UDS:   Amphetamine Screen, Urine   Date Value Ref Range Status   08/09/2023 positive  Final     Barbiturate Screen, Urine   Date Value Ref Range Status   08/09/2023 n  Final     Benzodiazepine Screen, Urine   Date Value Ref Range Status   08/09/2023 n  Final     Buprenorphine Urine   Date Value Ref Range Status   08/09/2023 n  Final     Cocaine Metabolite Screen, Urine   Date Value Ref Range Status   08/09/2023 n  Final     Gabapentin Screen, Urine   Date Value Ref Range Status   08/09/2023 n  Final     MDMA, Urine   Date Value Ref Range Status   08/09/2023 n  Final     Methamphetamine, Urine   Date Value Ref Range Status   08/09/2023 n  Final     Opiate Scrn, Ur   Date Value Ref Range Status   08/09/2023 n  Final     Oxycodone Screen, Ur   Date Value Ref Range Status   08/09/2023 n  Final     PCP Screen, Urine   Date Value Ref Range Status   08/09/2023 n  Final     Propoxyphene Screen, Urine   Date Value Ref Range Status   08/09/2023 n  Final     THC Screen, Urine   Date Value Ref Range Status   08/09/2023 n  Final     Tricyclic Antidepressants, Urine   Date Value Ref Range Status   08/09/2023 n  Final         Medication Contract: 8/10/23   Last Fill: 2/16/24    Requested Prescriptions     Pending Prescriptions Disp Refills    amphetamine-dextroamphetamine (ADDERALL XR) 30 MG extended release capsule 30 capsule 0     Sig: Take 1 capsule by mouth daily for 30 days.         Please approve or refuse this medication.   Ronda Andrew LPN

## 2024-03-20 ENCOUNTER — HOSPITAL ENCOUNTER (OUTPATIENT)
Dept: PAIN MANAGEMENT | Age: 44
Discharge: HOME | End: 2024-03-20
Payer: COMMERCIAL

## 2024-03-20 VITALS
TEMPERATURE: 97 F | OXYGEN SATURATION: 100 % | SYSTOLIC BLOOD PRESSURE: 102 MMHG | HEART RATE: 91 BPM | DIASTOLIC BLOOD PRESSURE: 76 MMHG | RESPIRATION RATE: 16 BRPM

## 2024-03-20 DIAGNOSIS — G43.109 MIGRAINE WITH AURA AND WITHOUT STATUS MIGRAINOSUS, NOT INTRACTABLE: Primary | ICD-10-CM

## 2024-03-20 PROCEDURE — 20553 NJX 1/MLT TRIGGER POINTS 3/>: CPT | Performed by: NURSE PRACTITIONER

## 2024-03-20 PROCEDURE — 20553 NJX 1/MLT TRIGGER POINTS 3/>: CPT

## 2024-03-20 PROCEDURE — 2500000003 HC RX 250 WO HCPCS

## 2024-03-20 PROCEDURE — 6360000002 HC RX W HCPCS

## 2024-03-20 RX ORDER — LIDOCAINE HYDROCHLORIDE 10 MG/ML
10 INJECTION, SOLUTION EPIDURAL; INFILTRATION; INTRACAUDAL; PERINEURAL ONCE
Status: DISCONTINUED | OUTPATIENT
Start: 2024-03-20 | End: 2024-03-22 | Stop reason: HOSPADM

## 2024-03-20 RX ORDER — BUPIVACAINE HYDROCHLORIDE 5 MG/ML
10 INJECTION, SOLUTION EPIDURAL; INTRACAUDAL ONCE
Status: DISCONTINUED | OUTPATIENT
Start: 2024-03-20 | End: 2024-03-22 | Stop reason: HOSPADM

## 2024-03-20 ASSESSMENT — PATIENT HEALTH QUESTIONNAIRE - PHQ9
1. LITTLE INTEREST OR PLEASURE IN DOING THINGS: NOT AT ALL
SUM OF ALL RESPONSES TO PHQ9 QUESTIONS 1 & 2: 0
2. FEELING DOWN, DEPRESSED OR HOPELESS: NOT AT ALL
SUM OF ALL RESPONSES TO PHQ9 QUESTIONS 1 & 2: 0
1. LITTLE INTEREST OR PLEASURE IN DOING THINGS: NOT AT ALL
SUM OF ALL RESPONSES TO PHQ QUESTIONS 1-9: 0
2. FEELING DOWN, DEPRESSED OR HOPELESS: NOT AT ALL
SUM OF ALL RESPONSES TO PHQ QUESTIONS 1-9: 0

## 2024-03-20 NOTE — DISCHARGE INSTRUCTIONS
[facility]  Post Procedure Discharge Instructions        YOU HAVE HAD THE FOLLOWING PROCEDURE:                                  [] Occipital Nerve Blocks  [] CTS wrist injection(s)  [] Knee Injection(s)         [] Shoulder Injection(s)   [] Elbow Injection(s)     [] Botox Injection  [x] Cervical Trigger Point Injections    [] Thoracic Trigger Point Injections    [] Lumbar Trigger Point Injections  [] Piriformis Trigger Point Injections  [] SI Joint Injection(s)     [] Trochanteric Bursa Injection(s)       [] Ankle Injection(s)   [] Plantar Fasciitis   []  ______________  Injection(s) [] Botox []  Migraines [] Spasticity    YOU HAVE RECEIVED THE FOLLOWING MEDICATIONS IN YOUR INJECTION(s)  [x] Lidocaine [x] Bupivacaine   [] DepoMedrol (steroid) [] Decadron (steroid)  []  Kenalog (steroid)   [] Toradol  [] Supartz [] Zilretta    [] Botox        PATIENT INFORMATION:   You may experience the following symptoms after your procedure. These symptoms are normal and should not cause concern:    You may have an increase in your pain. This may last 24 - 48 hours after your procedure.  You may have no change in the pain that you had prior to your injection(s).  You may have weakness or numbness in your affected extremity. If this occurs, this may last until numbing the medication wears off.     REPORT THE FOLLOWING SYMPTOMS TO YOUR DOCTOR:  Redness, swelling or drainage at the injection site(s)  Unusual pain that interferes with your normal activities of daily living.    OTHER INSTRUCTIONS:    [x] I will apply ice to the injection site(s) for at least 24 hours after the procedure. I will rotate the ice on for 20 minutes and off for 20 minutes for at least 24 hours.    [x] I will not apply heat for at least 48 hours and I will not take a hot bath or shower for at least 24 hours.     [x] I understand that if Lidocaine or Bupivacaine was used in my injection(s) that the injection site(s)

## 2024-03-25 SDOH — ECONOMIC STABILITY: FOOD INSECURITY: WITHIN THE PAST 12 MONTHS, YOU WORRIED THAT YOUR FOOD WOULD RUN OUT BEFORE YOU GOT MONEY TO BUY MORE.: NEVER TRUE

## 2024-03-25 SDOH — ECONOMIC STABILITY: FOOD INSECURITY: WITHIN THE PAST 12 MONTHS, THE FOOD YOU BOUGHT JUST DIDN'T LAST AND YOU DIDN'T HAVE MONEY TO GET MORE.: NEVER TRUE

## 2024-03-25 SDOH — ECONOMIC STABILITY: INCOME INSECURITY: HOW HARD IS IT FOR YOU TO PAY FOR THE VERY BASICS LIKE FOOD, HOUSING, MEDICAL CARE, AND HEATING?: NOT HARD AT ALL

## 2024-03-26 ENCOUNTER — OFFICE VISIT (OUTPATIENT)
Dept: PRIMARY CARE CLINIC | Age: 44
End: 2024-03-26
Payer: COMMERCIAL

## 2024-03-26 VITALS
WEIGHT: 112 LBS | BODY MASS INDEX: 18 KG/M2 | TEMPERATURE: 97.2 F | HEIGHT: 66 IN | DIASTOLIC BLOOD PRESSURE: 80 MMHG | RESPIRATION RATE: 16 BRPM | HEART RATE: 74 BPM | OXYGEN SATURATION: 98 % | SYSTOLIC BLOOD PRESSURE: 120 MMHG

## 2024-03-26 DIAGNOSIS — G44.52 NEW DAILY PERSISTENT HEADACHE: ICD-10-CM

## 2024-03-26 DIAGNOSIS — F90.9 ATTENTION DEFICIT HYPERACTIVITY DISORDER (ADHD), UNSPECIFIED ADHD TYPE: ICD-10-CM

## 2024-03-26 DIAGNOSIS — F41.9 ANXIETY: ICD-10-CM

## 2024-03-26 DIAGNOSIS — G43.909 MIGRAINE WITHOUT STATUS MIGRAINOSUS, NOT INTRACTABLE, UNSPECIFIED MIGRAINE TYPE: Primary | ICD-10-CM

## 2024-03-26 PROCEDURE — 99214 OFFICE O/P EST MOD 30 MIN: CPT | Performed by: FAMILY MEDICINE

## 2024-03-26 RX ORDER — NORETHINDRONE ACETATE AND ETHINYL ESTRADIOL 1.5-30(21)
1 KIT ORAL DAILY
Qty: 1 PACKET | Refills: 3 | Status: SHIPPED | OUTPATIENT
Start: 2024-03-26

## 2024-03-26 RX ORDER — SUMATRIPTAN 50 MG/1
50 TABLET, FILM COATED ORAL
Qty: 9 TABLET | Refills: 1 | Status: SHIPPED | OUTPATIENT
Start: 2024-03-26 | End: 2024-03-26

## 2024-03-26 SDOH — ECONOMIC STABILITY: INCOME INSECURITY: HOW HARD IS IT FOR YOU TO PAY FOR THE VERY BASICS LIKE FOOD, HOUSING, MEDICAL CARE, AND HEATING?: NOT VERY HARD

## 2024-03-26 SDOH — ECONOMIC STABILITY: FOOD INSECURITY: WITHIN THE PAST 12 MONTHS, THE FOOD YOU BOUGHT JUST DIDN'T LAST AND YOU DIDN'T HAVE MONEY TO GET MORE.: NEVER TRUE

## 2024-03-26 SDOH — ECONOMIC STABILITY: FOOD INSECURITY: WITHIN THE PAST 12 MONTHS, YOU WORRIED THAT YOUR FOOD WOULD RUN OUT BEFORE YOU GOT MONEY TO BUY MORE.: NEVER TRUE

## 2024-03-27 ASSESSMENT — ENCOUNTER SYMPTOMS
ABDOMINAL PAIN: 0
VOMITING: 0
BACK PAIN: 0
EYE DISCHARGE: 0
COLOR CHANGE: 0
COUGH: 0
DIARRHEA: 0
WHEEZING: 0
NAUSEA: 0

## 2024-03-27 NOTE — PROGRESS NOTES
SUBJECTIVE:    Patient ID: Dionne Guzman is a 43 y.o. female.    HPI:   Patient is seen today for a follow-up visit on her ADHD.  She is currently on Adderall XR and this works well for her.  She has not had any recent change in this.  She states that she is tolerating it well and denies any side effects to it.    She states that she does take Klonopin as well for anxiety.  She has been on this for a long time and it works well for her.  Again she has not had any change in this and does feel like she is doing very well.    She is having more problems with headaches and states that she would like to come off of her headache medications.  She states that she is having some side effects to the Topamax and states that she really would like to try to come off of this if possible.  She states that she saw neurosurgery and they did not really feel like there is anything from a neurological intervention standpoint that would benefit the headaches.  She states that she would like to get in to see [facility] neurology as she has seen [facility] neurology in the past for the headaches.  She states that Nora Garza is currently prescribing the medications for her headaches and she really does not want this to change but would like to see if there is anything else that can be done from a structural standpoint to help with the headaches    Past Medical History:   Diagnosis Date    ADHD (attention deficit hyperactivity disorder)     Clotting disorder (HCC)     factor 14, 11    GERD (gastroesophageal reflux disease)     Headache       Current Outpatient Medications on File Prior to Visit   Medication Sig Dispense Refill    amphetamine-dextroamphetamine (ADDERALL XR) 30 MG extended release capsule Take 1 capsule by mouth daily for 30 days. 30 capsule 0    butalbital-acetaminophen-caffeine (FIORICET, ESGIC) -40 MG per tablet Take 1 tablet by mouth every 6 hours as needed for Headaches 45 tablet 0    clonazePAM (KLONOPIN) 1 MG tablet

## 2024-04-03 ENCOUNTER — HOSPITAL ENCOUNTER (OUTPATIENT)
Dept: PAIN MANAGEMENT | Age: 44
Discharge: HOME | End: 2024-04-03
Payer: COMMERCIAL

## 2024-04-03 VITALS
SYSTOLIC BLOOD PRESSURE: 105 MMHG | RESPIRATION RATE: 16 BRPM | DIASTOLIC BLOOD PRESSURE: 72 MMHG | OXYGEN SATURATION: 100 % | TEMPERATURE: 97.6 F | HEART RATE: 96 BPM

## 2024-04-03 DIAGNOSIS — G43.109 MIGRAINE WITH AURA AND WITHOUT STATUS MIGRAINOSUS, NOT INTRACTABLE: Primary | ICD-10-CM

## 2024-04-03 PROCEDURE — 2580000003 HC RX 258

## 2024-04-03 PROCEDURE — 64615 CHEMODENERV MUSC MIGRAINE: CPT

## 2024-04-03 PROCEDURE — 64615 CHEMODENERV MUSC MIGRAINE: CPT | Performed by: NURSE PRACTITIONER

## 2024-04-03 PROCEDURE — 6360000002 HC RX W HCPCS

## 2024-04-03 PROCEDURE — A4216 STERILE WATER/SALINE, 10 ML: HCPCS

## 2024-04-03 RX ORDER — SODIUM CHLORIDE 9 MG/ML
4.5 INJECTION, SOLUTION INTRAMUSCULAR; INTRAVENOUS; SUBCUTANEOUS ONCE
Status: DISCONTINUED | OUTPATIENT
Start: 2024-04-03 | End: 2024-04-05 | Stop reason: HOSPADM

## 2024-04-03 NOTE — DISCHARGE INSTRUCTIONS
[facility] Physical And Pain Medicine  Post Procedure Discharge Instructions        YOU HAVE HAD THE FOLLOWING PROCEDURE:                                  [] Occipital Nerve Blocks  [] CTS wrist injection(s)  [] Knee Injection(s)         [] Shoulder Injection(s)   [] Elbow Injection(s)     [] Botox Injection  [] Cervical Trigger Point Injections    [] Thoracic Trigger Point Injections    [] Lumbar Trigger Point Injections  [] Piriformis Trigger Point Injections  [] SI Joint Injection(s)     [] Trochanteric Bursa Injection(s)       [] Ankle Injection(s)   [] Plantar Fasciitis   []  ______________  Injection(s) [] Botox []  Migraines [] Spasticity    YOU HAVE RECEIVED THE FOLLOWING MEDICATIONS IN YOUR INJECTION(s)  [] Lidocaine [] Bupivacaine   [] DepoMedrol (steroid) [] Decadron (steroid)  []  Kenalog (steroid)   [] Toradol  [] Supartz [] Zilretta    [] Botox        PATIENT INFORMATION:   You may experience the following symptoms after your procedure. These symptoms are normal and should not cause concern:    You may have an increase in your pain. This may last 24 - 48 hours after your procedure.  You may have no change in the pain that you had prior to your injection(s).  You may have weakness or numbness in your affected extremity. If this occurs, this may last until numbing the medication wears off.     REPORT THE FOLLOWING SYMPTOMS TO YOUR DOCTOR:  Redness, swelling or drainage at the injection site(s)  Unusual pain that interferes with your normal activities of daily living.    OTHER INSTRUCTIONS:    [] I will apply ice to the injection site(s) for at least 24 hours after the procedure. I will rotate the ice on for 20 minutes and off for 20 minutes for at least 24 hours.    [] I will not apply heat for at least 48 hours and I will not take a hot bath or shower for at least 24 hours.     [] I understand that if Lidocaine or Bupivacaine was used in my injection(s) that the injection site(s) will be

## 2024-04-03 NOTE — PROGRESS NOTES
[facility]   [address]  [place] [address]  [phone] p  [phone]    Procedure:  Level of Consciousness: [x]Alert [x]Oriented []Disoriented []Lethargic  Anxiety Level: [x]Calm []Anxious []Depressed []Other  Skin: []Warm [x]Dry []Cool []Moist []Intact []Other  Cardiovascular: [x]Palpitations: [x]Never []Occasionally []Frequently  Chest Pain: [x]No []Yes  Respiratory:  []Unlabored []Labored []Cough ([] Productive []Unproductive)  HCG Required: [x]No []Yes   Results: []Negative []Positive  Knowledge Level:        [x]Patient/Other verbalized understanding of pre-procedure instructions.        [x]Assessment of post-op care needs (transportation, responsible caregiver)        [x]Able to discuss health care problems and how to deal with it.  Factors that Affect Teaching:        Language Barrier: [x]No []Yes - why:        Hearing Loss:        [x]No []Yes            Corrective Device:  []Yes []No        Vision Loss:           [x]No []Yes            Corrective Device:  []Yes []No        Memory Loss:       [x]No []Yes            []Short Term []Long Term  Motivational Level:  [x]Asks Questions                  []Extremely Anxious       [x]Seems Interested               []Seems Uninterested                  []Denies need for Education  Risk for Injury:  [x]Patient oriented to person, place and time  []History of frequent falls/loss of balance  Nutritional:  []Change in appetite   []Weight Gain   []Weight Loss  Functional:  []Requires assistance with ADL's      Migraine  Follow up Assessment:  Patient experiences 30 headaches per month  Patient states that he/she has 30 headaches out of 30 days each month.  Patient states that he/she has 30 migraines out of 30 days each month.  Patient has experienced a  reduction in Migraine headaches less than 15 days per month []Yes [x]No  Patient has experienced a reduction in Migraine hours []Yes [x]No

## 2024-04-03 NOTE — PROCEDURES
[facility]    Patient Name: Dionne Guzman    : 1980                    Age: 43 y.o.    Sex: female    Date: April 3, 2024    Pre-op Diagnosis: Chronic Migraines    Post-op Diagnosis: Chronic Migraines    Procedure: Botox injections x 155 units (45 units wasted)    Performing Procedure: ALVERTO Riojas    Patient Vitals for the past 24 hrs:   BP Temp Temp src Pulse Resp SpO2   24 1054 105/72 97.6 °F (36.4 °C) Temporal 96 16 100 %       Previous Injections:  Patient had 30/30 headaches/migraines over the past month. Patient is behind on Botox injections due to insurance.    Indications:    Dionne Guzman has been treated for problems with chronic migraine headaches. The patient was taken through a conservative course of treatment without complete resolution of symptoms. Botox injection therapy was offered and after the risks and benefits of the procedure were explained to the patient, we had agreed to proceed.    The patient was taken to the procedure room and placed in the seated position. The following muscles were identified using palpation and standard landmarks. These muscles were marked and prepped with alcohol. A total of 155 units were injected after careful aspiration and the following distribution bilaterally:  10 units in 2 sites, procerus 5 units in one site, frontalis 20 units in 4 sites, temporalis 40 unit in 8 sites, occipitals 30 units in 6 sites, cervical paraspinals 20 units in 4 sites, and trapezius 30 units in 6 sites.    Discharge:  The patient tolerated the procedure well. There were no complications during the procedure and the patient was discharged home with discharge instructions.    The patient has been instructed to contact the office should there be any complications or questions to arise between today and their next appointment.    Plan:    Patient to follow up in the office in about 6 weeks.     ALVERTO Ngueyn -

## 2024-04-09 DIAGNOSIS — R51.9 ACUTE NONINTRACTABLE HEADACHE, UNSPECIFIED HEADACHE TYPE: ICD-10-CM

## 2024-04-10 RX ORDER — BUTALBITAL, ACETAMINOPHEN AND CAFFEINE 50; 325; 40 MG/1; MG/1; MG/1
1 TABLET ORAL EVERY 6 HOURS PRN
Qty: 45 TABLET | Refills: 0 | Status: SHIPPED | OUTPATIENT
Start: 2024-04-10

## 2024-04-11 DIAGNOSIS — E87.6 HYPOKALEMIA: ICD-10-CM

## 2024-04-11 DIAGNOSIS — F41.9 ANXIETY: ICD-10-CM

## 2024-04-11 RX ORDER — POTASSIUM CHLORIDE 20 MEQ/1
TABLET, EXTENDED RELEASE ORAL
Qty: 30 TABLET | Refills: 0 | Status: SHIPPED | OUTPATIENT
Start: 2024-04-11

## 2024-04-11 RX ORDER — CLONAZEPAM 1 MG/1
TABLET ORAL
Qty: 45 TABLET | Refills: 0 | Status: SHIPPED | OUTPATIENT
Start: 2024-04-11 | End: 2024-05-11

## 2024-04-11 RX ORDER — ONDANSETRON HYDROCHLORIDE 8 MG/1
8 TABLET, FILM COATED ORAL EVERY 8 HOURS PRN
Qty: 20 TABLET | Refills: 1 | Status: SHIPPED | OUTPATIENT
Start: 2024-04-11

## 2024-04-11 NOTE — TELEPHONE ENCOUNTER
PDMP Monitoring:    Last PDMP Greg as Reviewed ([place]):  Review User Review Instant Review Result   SUKHWINDER SLADE 3/11/2024  4:55 PM Reviewed PDMP [1]     Urine Drug Screenings (1 yr)       POCT Rapid Drug Screen  Collected: 8/9/2023  2:08 PM (Final result)                  Medication Contract and Consent for Opioid Use Documents Filed        No documents found

## 2024-04-15 DIAGNOSIS — F90.9 ATTENTION DEFICIT HYPERACTIVITY DISORDER (ADHD), UNSPECIFIED ADHD TYPE: ICD-10-CM

## 2024-04-15 RX ORDER — DEXTROAMPHETAMINE SACCHARATE, AMPHETAMINE ASPARTATE MONOHYDRATE, DEXTROAMPHETAMINE SULFATE AND AMPHETAMINE SULFATE 7.5; 7.5; 7.5; 7.5 MG/1; MG/1; MG/1; MG/1
30 CAPSULE, EXTENDED RELEASE ORAL DAILY
Qty: 30 CAPSULE | Refills: 0 | Status: SHIPPED | OUTPATIENT
Start: 2024-04-15 | End: 2024-05-15

## 2024-04-18 ENCOUNTER — HOSPITAL ENCOUNTER (OUTPATIENT)
Dept: PAIN MANAGEMENT | Age: 44
Discharge: HOME | End: 2024-04-18
Payer: COMMERCIAL

## 2024-04-18 VITALS
SYSTOLIC BLOOD PRESSURE: 92 MMHG | BODY MASS INDEX: 18.4 KG/M2 | OXYGEN SATURATION: 99 % | HEART RATE: 92 BPM | TEMPERATURE: 96.8 F | DIASTOLIC BLOOD PRESSURE: 66 MMHG | RESPIRATION RATE: 16 BRPM | WEIGHT: 114 LBS

## 2024-04-18 DIAGNOSIS — G43.109 MIGRAINE WITH AURA AND WITHOUT STATUS MIGRAINOSUS, NOT INTRACTABLE: Primary | ICD-10-CM

## 2024-04-18 PROCEDURE — 99213 OFFICE O/P EST LOW 20 MIN: CPT

## 2024-04-18 RX ORDER — UBROGEPANT 100 MG/1
1 TABLET ORAL DAILY PRN
Qty: 16 TABLET | Refills: 1 | Status: SHIPPED | OUTPATIENT
Start: 2024-04-18 | End: 2024-05-18

## 2024-04-18 RX ORDER — ATOGEPANT 60 MG/1
1 TABLET ORAL DAILY
Qty: 30 TABLET | Refills: 1 | Status: SHIPPED | OUTPATIENT
Start: 2024-04-18

## 2024-04-18 ASSESSMENT — ENCOUNTER SYMPTOMS
CONSTIPATION: 0
VISUAL CHANGE: 1
BACK PAIN: 1
PHOTOPHOBIA: 1

## 2024-04-18 ASSESSMENT — PAIN DESCRIPTION - ORIENTATION: ORIENTATION: RIGHT;LEFT

## 2024-04-18 ASSESSMENT — PAIN SCALES - GENERAL: PAINLEVEL_OUTOF10: 0

## 2024-04-18 ASSESSMENT — PAIN DESCRIPTION - LOCATION: LOCATION: HEAD;ARM

## 2024-04-18 ASSESSMENT — PAIN - FUNCTIONAL ASSESSMENT: PAIN_FUNCTIONAL_ASSESSMENT: PREVENTS OR INTERFERES SOME ACTIVE ACTIVITIES AND ADLS

## 2024-04-18 NOTE — PROGRESS NOTES
[facility]    Office Visit    Patient Name: Dionne Guzman    MR #: [id number]    Account #:[id number]    : 1980    Age: 43 y.o.    Sex: female    Date: 2023    PCP: Marla Cortez MD    Chief Complaint:   Chief Complaint   Patient presents with    Follow Up After Procedure     Bilateral cervical trigger points(49449)  Botox for migraines([id number])       History of Present Illness:   The patient is a 43 y.o. female who presents for procedure follow up.     Patient had bilateral cervical trigger point injections 2023 Patient had at least 80-85% relief of pain from procedure(s) for at least 8 weeks. After injection, patient was able to increase activity. Patient had less pain from aggravating factors. Patient was able to decrease pain medication usage. Patient received enough pain relief from injections that the patient would like to repeat the injection(s).     Patient had botox for migraines on 2023. Patient had over 100 hours of migraine freedom per month with at least 50% reduction in mean headache days per month with a decrease in severity, in intensity and in aggravating symptoms compared to pre-treatment.  Yes.    Patient has had improvement in quality of life with reduction in migraines and migraine symptoms Yes.     Patient has had a decrease in the use of acute migraine medications such as triptians, NSAID'S, and CGRP inhibitors Yes.     Patient continues to be evaluated for medication over use headaches (MOH) Yes.     Patient had MRI of cervical spine on 2023. C2-C3:  There is no posterior disc bulge, significant spinal canal or neural foraminal stenosis.  C3-C4:  There is right uncovertebral hypertrophy, with mild to moderate right neural foraminal stenosis.  There is no significant posterior disc bulge or spinal canal stenosis.  C4-C5:  There is no posterior disc bulge, significant spinal canal or neural foraminal

## 2024-04-18 NOTE — PROGRESS NOTES
Clinic Documentation      Education Provided:  [x] Review of Jamel  [] Agreement Review  [x] PEG Score Calculated [] PHQ Score Calculated [] ORT Score Calculated    [] Compliance Issues Discussed [] Cognitive Behavior Needs [x] Exercise [] Review of Test [] Financial Issues  [x] Tobacco/Alcohol Use Reviewed [x] Teaching [] New Patient [] Picture Obtained    Physician Plan:  [] Outgoing Referral  [] Pharmacy Consult  [] Test Ordered [] Prescription Ordered/Changed   [] Obtained Test Results / Consult Notes        Complete if patient is withholding blood thinner for procedure     Blood Thinner Patient is currently taking:      [] Plavix (Hold for 7 days)  [] Aspirin (Hold for 5 days)     [] Pletal (Hold for 2 days)  [] Pradaxa (Hold for 3 days)    [] Effient (Hold for 7 days)  [] Xarelto (Hold for 2 days)    [] Eliquis (Hold for 2 days)  [] Brilinta (Hold for 7 days)    [] Coumadin (Hold for 5 days) - (INR needs to be drawn the day prior to procedure- INR < 2.0)    [] Aggrenox (Hold for 7 days)        [] Patient will stop medication on their own.    [] Blood Thinner Form Faxed for approval to hold.   Provider form faxed to:     Assessment Completed by:  Electronically signed by Leesa Lim RN on 4/18/2024 at 7:51 AM

## 2024-04-18 NOTE — PROGRESS NOTES
Clinic Documentation      Education Provided:  [x] Review of Jamel  [] Agreement Review  [x] PEG Score Calculated [] PHQ Score Calculated [] ORT Score Calculated    [] Compliance Issues Discussed [] Cognitive Behavior Needs [x] Exercise [] Review of Test [] Financial Issues  [x] Tobacco/Alcohol Use Reviewed [x] Teaching [] New Patient [] Picture Obtained    Physician Plan:  [] Outgoing Referral  [] Pharmacy Consult  [] Test Ordered [x] Prescription Ordered/Changed   [] Obtained Test Results / Consult Notes        Complete if patient is withholding blood thinner for procedure     Blood Thinner Patient is currently taking:      [] Plavix (Hold for 7 days)  [] Aspirin (Hold for 5 days)     [] Pletal (Hold for 2 days)  [] Pradaxa (Hold for 3 days)    [] Effient (Hold for 7 days)  [] Xarelto (Hold for 2 days)    [] Eliquis (Hold for 2 days)  [] Brilinta (Hold for 7 days)    [] Coumadin (Hold for 5 days) - (INR needs to be drawn the day prior to procedure- INR < 2.0)    [] Aggrenox (Hold for 7 days)        [] Patient will stop medication on their own.    [] Blood Thinner Form Faxed for approval to hold.   Provider form faxed to:     Assessment Completed by:  Electronically signed by Krista Sanon MA on 4/18/2024 at 8:50 AM

## 2024-04-18 NOTE — TELEPHONE ENCOUNTER
There are no diagnoses linked to this encounter.    Requested Prescriptions     Pending Prescriptions Disp Refills    Atogepant (QULIPTA) 60 MG TABS 30 tablet 1     Sig: Take 1 tablet by mouth daily    Ubrogepant (UBRELVY) 100 MG TABS 16 tablet 1     Sig: Take 1 tablet by mouth daily as needed (Migraine may repeat x 1 within 4 hours) Take 1 tablet at the start of a migraine. May repeat x1 within 4 hours. Do not exceed 9 days per month       Continue medication with refill sent at appointment yes; refill sent to medical director at appointment no, see refill encounter dated 4/18/2024    Electronically signed by Yolanda Wilburn PA-C on 4/18/2024 at 9:22 AM

## 2024-04-22 ENCOUNTER — TELEPHONE (OUTPATIENT)
Dept: NEUROLOGY | Facility: HOSPITAL | Age: 44
End: 2024-04-22

## 2024-05-13 DIAGNOSIS — R51.9 ACUTE NONINTRACTABLE HEADACHE, UNSPECIFIED HEADACHE TYPE: ICD-10-CM

## 2024-05-13 DIAGNOSIS — F41.9 ANXIETY: ICD-10-CM

## 2024-05-13 DIAGNOSIS — F90.9 ATTENTION DEFICIT HYPERACTIVITY DISORDER (ADHD), UNSPECIFIED ADHD TYPE: ICD-10-CM

## 2024-05-13 RX ORDER — ATOGEPANT 60 MG/1
1 TABLET ORAL DAILY
Qty: 30 TABLET | Refills: 1 | Status: SHIPPED | OUTPATIENT
Start: 2024-05-13

## 2024-05-13 RX ORDER — UBROGEPANT 100 MG/1
1 TABLET ORAL DAILY PRN
Qty: 16 TABLET | Refills: 1 | Status: SHIPPED | OUTPATIENT
Start: 2024-05-13 | End: 2024-06-12

## 2024-05-14 RX ORDER — DEXTROAMPHETAMINE SACCHARATE, AMPHETAMINE ASPARTATE MONOHYDRATE, DEXTROAMPHETAMINE SULFATE AND AMPHETAMINE SULFATE 7.5; 7.5; 7.5; 7.5 MG/1; MG/1; MG/1; MG/1
30 CAPSULE, EXTENDED RELEASE ORAL DAILY
Qty: 30 CAPSULE | Refills: 0 | Status: SHIPPED | OUTPATIENT
Start: 2024-05-14 | End: 2024-06-13

## 2024-05-14 RX ORDER — CLONAZEPAM 1 MG/1
TABLET ORAL
Qty: 45 TABLET | Refills: 0 | Status: SHIPPED | OUTPATIENT
Start: 2024-05-14 | End: 2024-06-12

## 2024-05-14 RX ORDER — BUTALBITAL, ACETAMINOPHEN AND CAFFEINE 50; 325; 40 MG/1; MG/1; MG/1
1 TABLET ORAL EVERY 6 HOURS PRN
Qty: 45 TABLET | Refills: 0 | Status: SHIPPED | OUTPATIENT
Start: 2024-05-14

## 2024-05-21 ASSESSMENT — ENCOUNTER SYMPTOMS: PHOTOPHOBIA: 1

## 2024-06-14 DIAGNOSIS — R51.9 ACUTE NONINTRACTABLE HEADACHE, UNSPECIFIED HEADACHE TYPE: ICD-10-CM

## 2024-06-14 DIAGNOSIS — F41.9 ANXIETY: ICD-10-CM

## 2024-06-14 DIAGNOSIS — F90.9 ATTENTION DEFICIT HYPERACTIVITY DISORDER (ADHD), UNSPECIFIED ADHD TYPE: ICD-10-CM

## 2024-06-14 RX ORDER — DEXTROAMPHETAMINE SACCHARATE, AMPHETAMINE ASPARTATE MONOHYDRATE, DEXTROAMPHETAMINE SULFATE AND AMPHETAMINE SULFATE 7.5; 7.5; 7.5; 7.5 MG/1; MG/1; MG/1; MG/1
30 CAPSULE, EXTENDED RELEASE ORAL DAILY
Qty: 30 CAPSULE | Refills: 0 | Status: SHIPPED | OUTPATIENT
Start: 2024-06-14 | End: 2024-07-14

## 2024-06-14 RX ORDER — CLONAZEPAM 1 MG/1
TABLET ORAL
Qty: 45 TABLET | Refills: 0 | Status: SHIPPED | OUTPATIENT
Start: 2024-06-14 | End: 2024-07-13

## 2024-06-14 RX ORDER — BUTALBITAL, ACETAMINOPHEN AND CAFFEINE 50; 325; 40 MG/1; MG/1; MG/1
1 TABLET ORAL EVERY 6 HOURS PRN
Qty: 45 TABLET | Refills: 0 | Status: SHIPPED | OUTPATIENT
Start: 2024-06-14

## 2024-06-27 DIAGNOSIS — M62.838 MUSCLE SPASMS OF NECK: ICD-10-CM

## 2024-06-27 DIAGNOSIS — M40.40: ICD-10-CM

## 2024-06-27 DIAGNOSIS — M62.838 CERVICAL PARASPINAL MUSCLE SPASM: ICD-10-CM

## 2024-06-28 RX ORDER — CYCLOBENZAPRINE HCL 10 MG
10 TABLET ORAL
Qty: 30 TABLET | Refills: 2 | Status: SHIPPED | OUTPATIENT
Start: 2024-06-28 | End: 2024-09-26

## 2024-06-28 RX ORDER — ATOGEPANT 60 MG/1
1 TABLET ORAL DAILY
Qty: 30 TABLET | Refills: 2 | Status: SHIPPED | OUTPATIENT
Start: 2024-06-28

## 2024-06-28 RX ORDER — UBROGEPANT 100 MG/1
1 TABLET ORAL DAILY PRN
Qty: 16 TABLET | Refills: 2 | Status: SHIPPED | OUTPATIENT
Start: 2024-06-28 | End: 2024-07-28

## 2024-07-03 ENCOUNTER — TELEPHONE (OUTPATIENT)
Dept: NEUROSURGERY | Facility: CLINIC | Age: 44
End: 2024-07-03
Payer: COMMERCIAL

## 2024-07-03 NOTE — TELEPHONE ENCOUNTER
I called patient regarding her appt on Friday 7/12/24.  Harlan had ordered EMG and physical therapy but didn't complete either of them.    I need to speak to her about this appt.    GOKUL TONY [facility]  PHYSICIAN LEAD  DR CUATE OSULLIVAN  [facility]    IT IS OKAY FOR THE HUB TO DELIVER THIS INFORMATION TO THE PATIENT IF THEY RECEIVE THIS CALL BACK

## 2024-07-05 NOTE — TELEPHONE ENCOUNTER
Called patient again today and she stated she has scheduled an appointment to see someone else & no longer needs this apopintment.    GOKUL TONY [facility]  PHYSICIAN LEAD  DR CUATE OSULLIVAN  [facility]

## 2024-07-10 ENCOUNTER — HOSPITAL ENCOUNTER (OUTPATIENT)
Dept: PAIN MANAGEMENT | Age: 44
Discharge: HOME | End: 2024-07-10
Payer: COMMERCIAL

## 2024-07-10 VITALS
RESPIRATION RATE: 16 BRPM | TEMPERATURE: 97 F | WEIGHT: 115 LBS | OXYGEN SATURATION: 97 % | HEART RATE: 84 BPM | BODY MASS INDEX: 18.48 KG/M2 | DIASTOLIC BLOOD PRESSURE: 60 MMHG | HEIGHT: 66 IN | SYSTOLIC BLOOD PRESSURE: 97 MMHG

## 2024-07-10 DIAGNOSIS — M62.838 MUSCLE SPASMS OF NECK: ICD-10-CM

## 2024-07-10 DIAGNOSIS — G43.109 MIGRAINE WITH AURA AND WITHOUT STATUS MIGRAINOSUS, NOT INTRACTABLE: Primary | ICD-10-CM

## 2024-07-10 PROCEDURE — 99213 OFFICE O/P EST LOW 20 MIN: CPT

## 2024-07-10 PROCEDURE — 99214 OFFICE O/P EST MOD 30 MIN: CPT

## 2024-07-10 RX ORDER — METHOCARBAMOL 750 MG/1
750 TABLET, FILM COATED ORAL 2 TIMES DAILY
Qty: 60 TABLET | Refills: 2 | Status: SHIPPED | OUTPATIENT
Start: 2024-07-10 | End: 2024-10-08

## 2024-07-10 RX ORDER — ATOGEPANT 60 MG/1
1 TABLET ORAL DAILY
Qty: 30 TABLET | Refills: 2 | Status: SHIPPED | OUTPATIENT
Start: 2024-07-10

## 2024-07-10 RX ORDER — UBROGEPANT 100 MG/1
1 TABLET ORAL DAILY PRN
Qty: 16 TABLET | Refills: 2 | Status: SHIPPED | OUTPATIENT
Start: 2024-07-10 | End: 2024-08-09

## 2024-07-10 RX ORDER — CYCLOBENZAPRINE HCL 10 MG
10 TABLET ORAL
Qty: 30 TABLET | Refills: 2 | Status: SHIPPED | OUTPATIENT
Start: 2024-07-10 | End: 2024-10-08

## 2024-07-10 ASSESSMENT — PAIN DESCRIPTION - INTENSITY: RATING_2: 9

## 2024-07-10 ASSESSMENT — ENCOUNTER SYMPTOMS
SCALP TENDERNESS: 1
NAUSEA: 1
CONSTIPATION: 0
PHOTOPHOBIA: 1
VISUAL CHANGE: 1

## 2024-07-10 ASSESSMENT — PAIN DESCRIPTION - ORIENTATION: ORIENTATION: LOWER

## 2024-07-10 ASSESSMENT — PAIN DESCRIPTION - LOCATION
LOCATION_2: HEAD
LOCATION: NECK

## 2024-07-10 ASSESSMENT — PAIN DESCRIPTION - PAIN TYPE: TYPE: CHRONIC PAIN

## 2024-07-10 ASSESSMENT — PAIN SCALES - GENERAL: PAINLEVEL_OUTOF10: 4

## 2024-07-10 NOTE — PROGRESS NOTES
Clinic Documentation      Education Provided:  [x] Review of Jamel  [] Agreement Review  [x] PEG Score Calculated [] PHQ Score Calculated [] ORT Score Calculated    [] Compliance Issues Discussed [] Cognitive Behavior Needs [x] Exercise [] Review of Test [] Financial Issues  [x] Tobacco/Alcohol Use Reviewed [x] Teaching [] New Patient [] Picture Obtained    Physician Plan:  [] Outgoing Referral  [] Pharmacy Consult  [] Test Ordered [x] Prescription Ordered/Changed   [] Obtained Test Results / Consult Notes        Complete if patient is withholding blood thinner for procedure     Blood Thinner Patient is currently taking:      [] Plavix (Hold for 7 days)  [] Aspirin (Hold for 5 days)     [] Pletal (Hold for 2 days)  [] Pradaxa (Hold for 3 days)    [] Effient (Hold for 7 days)  [] Xarelto (Hold for 2 days)    [] Eliquis (Hold for 2 days)  [] Brilinta (Hold for 7 days)    [] Coumadin (Hold for 5 days) - (INR needs to be drawn the day prior to procedure- INR < 2.0)    [] Aggrenox (Hold for 7 days)        [] Patient will stop medication on their own.    [] Blood Thinner Form Faxed for approval to hold.   Provider form faxed to:     Assessment Completed by:  Electronically signed by Barbie Redd RN on 7/10/2024 at 12:12 PM  
note  [] Medication agreement with provider signed today  [] Medication agreement with provider on file under media   [] Medication regimen effective with no c/o side effects and continued   [] New patient continuing current medication while developing POC   [] On going assessment and evaluation of medication regimen  [] Medication regimen not effective see plan for changes  [x] Jamel reviewed & on file under media     CC:  Marla Cortez MD Ashley D Sharp, APRN - CNP, 7/10/2024 at 12:26 PM    EMR dragon/transcription disclaimer: Much of this encounter note is electronic transcription/translation of spoken language to printed tach. Electronic translation of spoken language may be erroneous, or at times, nonsensical words or phrases may be inadvertently transcribed. Although, I have reviewed the note for such errors, some may still exist.

## 2024-07-10 NOTE — TELEPHONE ENCOUNTER
1. Muscle spasms of neck    2. Migraine with aura and without status migrainosus, not intractable      Requested Prescriptions     Pending Prescriptions Disp Refills    Ubrogepant (UBRELVY) 100 MG TABS 16 tablet 2     Sig: Take 1 tablet by mouth daily as needed (Migraine may repeat x 1 within 4 hours) Take 1 tablet at the start of a migraine. May repeat x1 within 4 hours. Do not exceed 9 days per month    Atogepant (QULIPTA) 60 MG TABS 30 tablet 2     Sig: Take 1 tablet by mouth daily    cyclobenzaprine (FLEXERIL) 10 MG tablet 30 tablet 2     Sig: Take 1 tablet by mouth nightly as needed for Muscle spasms    methocarbamol (ROBAXIN) 750 MG tablet 60 tablet 2     Sig: Take 1 tablet by mouth in the morning and at bedtime       Continue medication with refill sent at appointment yes; refill sent to medical director at appointment no, see refill encounter dated 7/10/2024    Electronically signed by ALVERTO Diaz CNP on 7/10/2024 at 12:25 PM

## 2024-07-11 ENCOUNTER — HOSPITAL ENCOUNTER (OUTPATIENT)
Dept: PAIN MANAGEMENT | Age: 44
Discharge: HOME | End: 2024-07-11
Payer: COMMERCIAL

## 2024-07-11 VITALS
HEART RATE: 71 BPM | SYSTOLIC BLOOD PRESSURE: 115 MMHG | RESPIRATION RATE: 18 BRPM | DIASTOLIC BLOOD PRESSURE: 68 MMHG | TEMPERATURE: 96.6 F | OXYGEN SATURATION: 100 %

## 2024-07-11 DIAGNOSIS — G43.109 MIGRAINE WITH AURA AND WITHOUT STATUS MIGRAINOSUS, NOT INTRACTABLE: Primary | ICD-10-CM

## 2024-07-11 PROCEDURE — 6360000002 HC RX W HCPCS

## 2024-07-11 PROCEDURE — 64615 CHEMODENERV MUSC MIGRAINE: CPT

## 2024-07-11 PROCEDURE — 2580000003 HC RX 258

## 2024-07-11 RX ORDER — SODIUM CHLORIDE 9 MG/ML
5 INJECTION, SOLUTION INTRAMUSCULAR; INTRAVENOUS; SUBCUTANEOUS ONCE
Status: DISCONTINUED | OUTPATIENT
Start: 2024-07-11 | End: 2024-07-13 | Stop reason: HOSPADM

## 2024-07-11 NOTE — PROGRESS NOTES
[facility]   [address]  [place] [address]  [phone] p  [phone]    Procedure:  Level of Consciousness: [x]Alert [x]Oriented []Disoriented []Lethargic  Anxiety Level: [x]Calm []Anxious []Depressed []Other  Skin: [x]Warm [x]Dry []Cool []Moist []Intact []Other  Cardiovascular: [x]Palpitations: [x]Never []Occasionally []Frequently  Chest Pain: [x]No []Yes  Respiratory:  [x]Unlabored []Labored []Cough ([] Productive []Unproductive)  HCG Required: [x]No []Yes   Results: []Negative []Positive  Knowledge Level:        [x]Patient/Other verbalized understanding of pre-procedure instructions.        [x]Assessment of post-op care needs (transportation, responsible caregiver)        [x]Able to discuss health care problems and how to deal with it.  Factors that Affect Teaching:        Language Barrier: [x]No []Yes - why:        Hearing Loss:        [x]No []Yes            Corrective Device:  []Yes [x]No        Vision Loss:           [x]No []Yes            Corrective Device:  []Yes [x]No        Memory Loss:       [x]No []Yes            []Short Term []Long Term  Motivational Level:  [x]Asks Questions                  []Extremely Anxious       [x]Seems Interested               []Seems Uninterested                  [x]Denies need for Education  Risk for Injury:  [x]Patient oriented to person, place and time  []History of frequent falls/loss of balance  Nutritional:  []Change in appetite   []Weight Gain   []Weight Loss  Functional:  []Requires assistance with ADL's      Migraine  Follow up Assessment:  Patient experiences 18 headaches per month  Patient states that he/she has 18 headaches out of 30 days each month.  Patient states that he/she has 15 migraines out of 30 days each month.  Patient has experienced a  reduction in Migraine headaches less than 15 days per month [x]Yes []No  Patient has experienced a reduction in Migraine hours [x]Yes []No

## 2024-07-11 NOTE — PROCEDURES
[facility]    Patient Name: Dionne Guzman    : 1980                    Age: 43 y.o.    Sex: female    Date: 2024    Pre-op Diagnosis: Chronic Migraines    Post-op Diagnosis: Chronic Migraines    Procedure: Botox injections x 155 units (45 units wasted)    Performing Procedure:  Karime Adhikari APRN - CNP    Patient Vitals for the past 24 hrs:   BP Temp Temp src Pulse Resp SpO2   24 0959 115/68 (!) 96.6 °F (35.9 °C) Temporal 71 18 100 %       Previous Injections:  Patient had 30/15 headaches/migraines over the past month.     Indications:    Dionne Guzman has been treated for problems with chronic migraine headaches. The patient was taken through a conservative course of treatment without complete resolution of symptoms. Botox injection therapy was offered and after the risks and benefits of the procedure were explained to the patient, we had agreed to proceed.    The patient was taken to the procedure room and placed in the seated position. The following muscles were identified using palpation and standard landmarks. These muscles were marked and prepped with alcohol. A total of 155 units were injected after careful aspiration and the following distribution bilaterally:  10 units in 2 sites, procerus 5 units in one site, frontalis 20 units in 4 sites, temporalis 40 unit in 8 sites, occipitals 30 units in 6 sites, cervical paraspinals 20 units in 4 sites, and trapezius 30 units in 6 sites.    Discharge:  The patient tolerated the procedure well. There were no complications during the procedure and the patient was discharged home with discharge instructions.    The patient has been instructed to contact the office should there be any complications or questions to arise between today and their next appointment.    Plan:  [] Will return to the office in   [] 1 month  [] 4 - 6 weeks  [x] 8 weeks   [] 12 weeks:  [] Procedure Follow-up   [] Office Visit

## 2024-07-11 NOTE — DISCHARGE INSTRUCTIONS
[facility]  Post Procedure Discharge Instructions        YOU HAVE HAD THE FOLLOWING PROCEDURE:                                  [] Occipital Nerve Blocks  [] CTS wrist injection(s)  [] Knee Injection(s)         [] Shoulder Injection(s)   [] Elbow Injection(s)     [] Botox Injection  [] Cervical Trigger Point Injections    [] Thoracic Trigger Point Injections    [] Lumbar Trigger Point Injections  [] Piriformis Trigger Point Injections  [] SI Joint Injection(s)     [] Trochanteric Bursa Injection(s)       [] Ankle Injection(s)   [] Plantar Fasciitis   []  ______________  Injection(s) [x] Botox [x]  Migraines [] Spasticity    YOU HAVE RECEIVED THE FOLLOWING MEDICATIONS IN YOUR INJECTION(s)  [] Lidocaine [] Bupivacaine   [] DepoMedrol (steroid) [] Decadron (steroid)  []  Kenalog (steroid)   [] Toradol  [] Supartz [] Zilretta    [x] Botox        PATIENT INFORMATION:   You may experience the following symptoms after your procedure. These symptoms are normal and should not cause concern:    You may have an increase in your pain. This may last 24 - 48 hours after your procedure.  You may have no change in the pain that you had prior to your injection(s).  You may have weakness or numbness in your affected extremity. If this occurs, this may last until numbing the medication wears off.     REPORT THE FOLLOWING SYMPTOMS TO YOUR DOCTOR:  Redness, swelling or drainage at the injection site(s)  Unusual pain that interferes with your normal activities of daily living.    OTHER INSTRUCTIONS:    [] I will apply ice to the injection site(s) for at least 24 hours after the procedure. I will rotate the ice on for 20 minutes and off for 20 minutes for at least 24 hours.    [] I will not apply heat for at least 48 hours and I will not take a hot bath or shower for at least 24 hours.     [] I understand that if Lidocaine or Bupivacaine was used in my injection(s) that the injection site(s) will

## 2024-07-14 DIAGNOSIS — F90.9 ATTENTION DEFICIT HYPERACTIVITY DISORDER (ADHD), UNSPECIFIED ADHD TYPE: ICD-10-CM

## 2024-07-14 DIAGNOSIS — F41.9 ANXIETY: ICD-10-CM

## 2024-07-14 DIAGNOSIS — R51.9 ACUTE NONINTRACTABLE HEADACHE, UNSPECIFIED HEADACHE TYPE: ICD-10-CM

## 2024-07-15 ENCOUNTER — TELEPHONE (OUTPATIENT)
Dept: PAIN MANAGEMENT | Age: 44
End: 2024-07-15

## 2024-07-15 RX ORDER — BUTALBITAL, ACETAMINOPHEN AND CAFFEINE 50; 325; 40 MG/1; MG/1; MG/1
1 TABLET ORAL EVERY 6 HOURS PRN
Qty: 45 TABLET | Refills: 0 | Status: SHIPPED | OUTPATIENT
Start: 2024-07-15

## 2024-07-15 RX ORDER — DEXTROAMPHETAMINE SACCHARATE, AMPHETAMINE ASPARTATE MONOHYDRATE, DEXTROAMPHETAMINE SULFATE AND AMPHETAMINE SULFATE 7.5; 7.5; 7.5; 7.5 MG/1; MG/1; MG/1; MG/1
30 CAPSULE, EXTENDED RELEASE ORAL DAILY
Qty: 30 CAPSULE | Refills: 0 | Status: SHIPPED | OUTPATIENT
Start: 2024-07-15 | End: 2024-08-14

## 2024-07-15 RX ORDER — CLONAZEPAM 1 MG/1
TABLET ORAL
Qty: 45 TABLET | Refills: 0 | Status: SHIPPED | OUTPATIENT
Start: 2024-07-15 | End: 2024-08-12

## 2024-07-15 NOTE — TELEPHONE ENCOUNTER
Provider needs to review PDMP    PDMP Monitoring:    Last PDMP Greg as Reviewed ([place]):  Review User Review Instant Review Result   SUKHWINDER SLADE 3/11/2024  4:55 PM Reviewed PDMP [1]     Urine Drug Screenings (1 yr)       POCT Rapid Drug Screen  Collected: 8/9/2023  2:08 PM (Final result)                  Medication Contract and Consent for Opioid Use Documents Filed        No documents found

## 2024-07-15 NOTE — TELEPHONE ENCOUNTER
Doing good, but usually gets cervical trigger points with her Botox. Scheduled her for 8/15/24 for cervical trigger points.

## 2024-07-22 ENCOUNTER — TELEPHONE (OUTPATIENT)
Dept: HEMATOLOGY | Age: 44
End: 2024-07-22

## 2024-07-22 NOTE — TELEPHONE ENCOUNTER
I called and reminded pt. of their appt. on 07/29/2024. Made pt. aware to arrive at appt. time & not lab appt. time. I also advised pt. to arrive no sooner than appt time. Pt voiced understanding and confirmed appt. date and time.

## 2024-07-29 RX ORDER — NORETHINDRONE ACETATE AND ETHINYL ESTRADIOL 1.5-30(21)
1 KIT ORAL DAILY
Qty: 1 PACKET | Refills: 3 | Status: SHIPPED | OUTPATIENT
Start: 2024-07-29

## 2024-07-29 ASSESSMENT — ENCOUNTER SYMPTOMS
EYES NEGATIVE: 1
BLOOD IN STOOL: 0
DIARRHEA: 0
NAUSEA: 0
COUGH: 0
RESPIRATORY NEGATIVE: 1
EYE REDNESS: 0
WHEEZING: 0
ABDOMINAL PAIN: 0
EYE PAIN: 0
GASTROINTESTINAL NEGATIVE: 1
VOMITING: 0
SORE THROAT: 0
CONSTIPATION: 0
SHORTNESS OF BREATH: 0
BACK PAIN: 0
EYE DISCHARGE: 0

## 2024-07-29 NOTE — PROGRESS NOTES
Progress note      Pt Name: Dionne Guzman  YOB: 1980  MRN: [id number]    Date of evaluation: 07/30/2024  History Obtained From:  patient, electronic medical record    CHIEF COMPLAINT:    Chief Complaint   Patient presents with    Follow-up     diopathic thrombocytopenic purpura (HCC)     HISTORY OF PRESENT ILLNESS:    Dionne Guzman is a 43 y.o.  female who is currently being followed for diagnosis of primary idiopathic thrombocytopenic purpura (ITP), intermittent mild leukopenia without neutropenia and bleeding diatheses.  The diagnosis of ITP is made from exclusion of multiple diagnoses to include autoimmune disorder, lymphoma, splenomegaly, myelodysplastic syndrome, viral infections, plasma cell myeloma, alcohol or medication induced, and metastatic carcinoma; confirmed on bone marrow aspiration biopsy on 1/10/2024.  She has also been evaluated for history of bleeding diatheses (heavy menstrual cycles that were controlled with birth control, occasional bleeding of the gums with oral care, and excessive bleeding with nicks and cuts), work-up has been negative for von Willebrand disease on 9/7/2019 and on 4/6/2023.  Current recommendation is conservative monitoring of her blood counts.  In relation to her history of bleeding diatheses, in the future if Dionne experiences any bleeding diatheses will make a referral to the Hemophilia clinic to see ALVERTO Dietrich.  Dionne returns today in scheduled follow-up for evaluation, lab monitoring, and further treatment recommendations.  She reports that she recently had a heavy menstrual cycle with vaginal bleeding for 7 days, currently not bleeding over the last 6 days.  She confirmed that she is currently not established with GYN and is overdue for a routine Pap smear, she agreed to a referral for GYN for further evaluation.  If she persistently has vaginal bleeding and GYN evaluation is without finding referral to ALVERTO Dietrich.    Today's

## 2024-07-30 ENCOUNTER — HOSPITAL ENCOUNTER (OUTPATIENT)
Dept: INFUSION THERAPY | Age: 44
Discharge: HOME | End: 2024-07-30
Payer: COMMERCIAL

## 2024-07-30 ENCOUNTER — OFFICE VISIT (OUTPATIENT)
Dept: HEMATOLOGY | Age: 44
End: 2024-07-30
Payer: COMMERCIAL

## 2024-07-30 VITALS
HEIGHT: 66 IN | WEIGHT: 118.2 LBS | HEART RATE: 76 BPM | DIASTOLIC BLOOD PRESSURE: 62 MMHG | OXYGEN SATURATION: 98 % | BODY MASS INDEX: 18.99 KG/M2 | SYSTOLIC BLOOD PRESSURE: 102 MMHG | TEMPERATURE: 98.2 F

## 2024-07-30 DIAGNOSIS — D69.3 IDIOPATHIC THROMBOCYTOPENIC PURPURA (HCC): Primary | ICD-10-CM

## 2024-07-30 DIAGNOSIS — D69.6 THROMBOCYTOPENIA (HCC): ICD-10-CM

## 2024-07-30 DIAGNOSIS — D69.9 BLEEDING DIATHESIS (HCC): ICD-10-CM

## 2024-07-30 DIAGNOSIS — D72.819 LEUKOPENIA, UNSPECIFIED TYPE: ICD-10-CM

## 2024-07-30 DIAGNOSIS — Z87.42 HISTORY OF ABNORMAL MENSTRUAL CYCLE: ICD-10-CM

## 2024-07-30 LAB
BASOPHILS # BLD: 0.02 K/UL (ref 0.01–0.08)
BASOPHILS NFR BLD: 0.4 % (ref 0.1–1.2)
EOSINOPHIL # BLD: 0.02 K/UL (ref 0.04–0.54)
EOSINOPHIL NFR BLD: 0.4 % (ref 0.7–7)
ERYTHROCYTE [DISTWIDTH] IN BLOOD BY AUTOMATED COUNT: 12.5 % (ref 11.7–14.4)
HCT VFR BLD AUTO: 41 % (ref 34.1–44.9)
HGB BLD-MCNC: 13.5 G/DL (ref 11.2–15.7)
LYMPHOCYTES # BLD: 1.07 K/UL (ref 1.18–3.74)
LYMPHOCYTES NFR BLD: 23.3 % (ref 19.3–53.1)
MCH RBC QN AUTO: 32 PG (ref 25.6–32.2)
MCHC RBC AUTO-ENTMCNC: 32.9 G/DL (ref 32.3–35.5)
MCV RBC AUTO: 97.2 FL (ref 79.4–94.8)
MONOCYTES # BLD: 0.28 K/UL (ref 0.24–0.82)
MONOCYTES NFR BLD: 6.1 % (ref 4.7–12.5)
NEUTROPHILS # BLD: 3.2 K/UL (ref 1.56–6.13)
NEUTS SEG NFR BLD: 69.6 % (ref 34–71.1)
PLATELET # BLD AUTO: 142 K/UL (ref 182–369)
PMV BLD AUTO: 9.4 FL (ref 7.4–10.4)
RBC # BLD AUTO: 4.22 M/UL (ref 3.93–5.22)
WBC # BLD AUTO: 4.6 K/UL (ref 3.98–10.04)

## 2024-07-30 PROCEDURE — 36415 COLL VENOUS BLD VENIPUNCTURE: CPT

## 2024-07-30 PROCEDURE — 99213 OFFICE O/P EST LOW 20 MIN: CPT | Performed by: NURSE PRACTITIONER

## 2024-07-30 PROCEDURE — 85025 COMPLETE CBC W/AUTO DIFF WBC: CPT

## 2024-07-30 PROCEDURE — 99213 OFFICE O/P EST LOW 20 MIN: CPT

## 2024-08-02 ENCOUNTER — OFFICE VISIT (OUTPATIENT)
Dept: NEUROLOGY | Facility: CLINIC | Age: 44
End: 2024-08-02
Payer: COMMERCIAL

## 2024-08-02 VITALS
WEIGHT: 120 LBS | RESPIRATION RATE: 17 BRPM | BODY MASS INDEX: 19.29 KG/M2 | HEIGHT: 66 IN | HEART RATE: 70 BPM | SYSTOLIC BLOOD PRESSURE: 102 MMHG | DIASTOLIC BLOOD PRESSURE: 62 MMHG

## 2024-08-02 DIAGNOSIS — G43.719 INTRACTABLE CHRONIC MIGRAINE WITHOUT AURA AND WITHOUT STATUS MIGRAINOSUS: Primary | ICD-10-CM

## 2024-08-02 DIAGNOSIS — G44.40 MEDICATION OVERUSE HEADACHE: ICD-10-CM

## 2024-08-02 RX ORDER — FREMANEZUMAB-VFRM 225 MG/1.5ML
225 INJECTION SUBCUTANEOUS
Qty: 1.5 ML | Refills: 11 | Status: SHIPPED | OUTPATIENT
Start: 2024-08-02 | End: 2025-08-02

## 2024-08-02 RX ORDER — FREMANEZUMAB-VFRM 225 MG/1.5ML
225 INJECTION SUBCUTANEOUS
Qty: 1.5 ML | Refills: 0 | COMMUNITY
Start: 2024-08-02

## 2024-08-02 RX ORDER — PREDNISONE 20 MG/1
20 TABLET ORAL 3 TIMES DAILY
Qty: 15 TABLET | Refills: 0 | Status: SHIPPED | OUTPATIENT
Start: 2024-08-02 | End: 2024-08-07

## 2024-08-02 RX ORDER — ATOGEPANT 60 MG/1
TABLET ORAL DAILY
COMMUNITY

## 2024-08-02 NOTE — PROGRESS NOTES
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 *    PLAN:  1. Muscle spasms of neck  - cyclobenzaprine (FLEXERIL) 10 MG tablet; Take 1 tablet by mouth nightly as needed for Muscle spasms Dispense: 30 tablet; Refill: 2    - Reschedule Cervical/Thoracic Trigger Point Injections ( 3 or more muscles) with Nellie Casey APRN as patient has received relief from injections previously.    2. Migraine with aura and without status migrainosus, not intractable  - Reschedule Botox for Migraines ( 200 units) with Nellie Casey APRN, Patient had over 100 hours of migraine freedom per month with at least 50% reduction in mean headache days per month with a decrease in severity, in intensity and in aggravating symptoms compared to pre-treatment    3.  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 Neck pain  - Proceed with referral to Neurosurgery for consultation.    [x] Follow up  [] 4 weeks  [] 6 weeks  [] 8 weeks  [x] 10 weeks  [] 3 months  [x] Post procedure to evaluate effectiveness of treatment  [] To evaluate medications changes made at office visit.  [] To review diagnostics ordered at last visit.  [x] Follow up referral  [] For management of controlled substance  [] Random UDS as indicated by ORT score or if indicated by abberent behaviors    Discussion: Patient agreed with POC and questions were asked and answered. See DC instructions.    Activity: discussed exercise as beneficial to pain reduction, encouraged stretching exercise.    Goal:  Pain Management Goals of Therapy:  [] Resolution in pain  [x] Decrease in pain level  [x] Improvement in ADL's  [x] Increase in activities with less pain  [] Decrease in medication    Controlled substance monitoring:  Yearly    [] Discussed medication side effects, risk of tolerance and/or dependence, and/or alternative treatment  [] Discussed the detrimental effects of long term narcotic use in younger patients especially women of childbearing years.  [] No signs and symptoms of potential drug abuse or diversion were identified  [] Signs of potential drug abuse or diversion were identified  [] ORT Score  [] UDS non-compliant  [] See Note  [] Random urine drug screen sent today  [] Random urine drug screen not completed today  [] Deferred New Patient  [] Compliant  [] Not Compliant see note  [] Medication agreement with provider signed today  [] Medication agreement with provider on file under media  [] Medication regimen effective with no c/o side effects and continued  [] New patient continuing current medication while developing POC  [] On going assessment and evaluation of medication regimen  [] Medication regimen not effective see plan for changes  [x] Atif reviewed & on file under media    CC:  Reshma Bonds MD Ashley D Sharp, APRN - CNP, 2/8/2024 at 3:37 PM    EMR dragon/transcription disclaimer: Much of this encounter note is electronic transcription/translation of spoken language to printed tach. Electronic translation of spoken language may be erroneous, or at times, nonsensical words or phrases may be inadvertently transcribed. Although, I have reviewed the note for such errors, some may still exist.  Electronically signed by Page Young APRN - CNP at 02/08/2024 4:38 PM EST    MRI BRAIN WO CONTRAST (12/28/2021 11:25 EST)   MRI BRAIN WO CONTRAST  Order: [id number]  Impression    1.  Unremarkable MRI of the brain for age.  Signed by Dr Ani Saldana  Narrative    EXAM: MR BRAIN WITHOUT IV CONTRAST 12/28/2021.  COMPARISON: None  HISTORY: 41 years-old Female. Chronic headaches.  TECHNIQUE:  Routine pulse sequences of the brain were obtained without IV  contrast.  REPORT:  The ventricles and cerebrospinal fluid spaces are normal in size and  configuration for the patient's age. There is no evidence of  mass-effect or midline shift. No reduced diffusivity is demonstrated.  The brainstem, sella, pituitary, cerebellum are unremarkable. The  basal cisterns are preserved.  No acute osseous lesion. The intraorbital structures are unremarkable.  The flow voids are preserved.   The mastoid air cells are clear. The paranasal sinuses are free of  obstructive mucosal disease.  Exam End: 12/28/21 11:25 Last Resulted: 12/28/21 11:50   Received From: [facility]  Result Received: 02/14/24 10:03     CT HEAD WO CONTRAST (06/15/2022 23:34 EDT)   CT HEAD WO CONTRAST  Order: [id number]  Impression    1. No evidence of acute intracranial hemorrhage, mass, mass effect or infarct.  Recommendation: Follow up as clinically indicated.  All CT scans at this facility utilize dose modulation, iterative reconstruction, and/or weight based dosing when appropriate to reduce radiation dose to as  low as reasonably achievable.  Electronically Signed by EFRAIN JEREZ at 15-Naga-2022 11:50:54 PM          Narrative    NO PRIOR REPORT AVAILABLE  Exam: CT OF THE BRAIN WITHOUT CONTRAST  Clinical data: Headache.  Technique: Contiguous axial images are obtained from the skull base to vertex without intravenous contrast. Reformatted/MPR images were performed. Radiation dose: CTDIvol =39.45 mGy, DLP =650 mGy x cm.  Prior studies: MRI of the brain dated 12/28/2021 images.  Findings:  No acute intracranial abnormality is present. No evidence of acute cortical infarction, hemorrhage, mass or mass effect. No hydrocephalus or abnormal extra-axial fluid collections are present. The posterior fossa is unremarkable.  The skull base and calvarium are intact. The included portions of the paranasal sinuses and mastoid air cells are clear.  Exam End: 06/15/22 23:34 Last Resulted: 06/15/22 23:50   Received From: [facility]  Result Received: 02/14/24 10:03     MRI CYBERKNIFE CERVICAL SPINE WO CONTRAST (11/01/2023 12:16 EDT)   MRI CYBERKNIFE CERVICAL SPINE WO CONTRAST  Order: [id number]  Impression    1. Degenerative disc disease at C5-C6 causing mild spinal canal stenosis and severe right neural foraminal stenosis.  2. Degenerative changes at C3-C4 with mild to moderate right neural foraminal stenosis.      ______________________________________  Electronically signed by: NANCY GEORGE M.D.  Date:     11/01/2023  Time:    12:10  Narrative    EXAM:  CERVICAL SPINE MRI WITHOUT CONTRAST    HISTORY:  Numbness and tingling of both upper extremities.  Muscle spasms of neck.    TECHNIQUE:  MRI of the cervical spine was performed without the administration of intravenous contrast.    COMPARISON:  Cervical spine radiographs dated 09/22/2022.    FINDINGS:    Visualized posterior fossa is within normal limits.    There is mild loss of disc height at C5-C6.    The spinal cord is normal in caliber.    There is no abnormal  cord signal.    There is normal cervical alignment.    Vertebral body heights are maintained.    Intervertebral disc spaces are normal.    The paravertebral soft tissue are unremarkable.    C2-C3:  There is no posterior disc bulge, significant spinal canal or neural foraminal stenosis.  C3-C4:  There is right uncovertebral hypertrophy, with mild to moderate right neural foraminal stenosis.  There is no significant posterior disc bulge or spinal canal stenosis.  C4-C5:  There is no posterior disc bulge, significant spinal canal or neural foraminal stenosis.  C5-C6:  There is a moderate-sized right subarticular and foraminal disc protrusion mildly indenting the spinal cord, with mild spinal canal stenosis and severe right neural foraminal stenosis.  C6-C7:  There is no posterior disc bulge, significant spinal canal or neural foraminal stenosis.  C7-T1:  There is no posterior disc bulge, significant spinal canal or neural foraminal stenosis.  Exam End: 11/01/23 12:16 Last Resulted: 11/01/23 13:13   Received From: [facility]  Result Received: 12/20/23 09:48                  Impression:  Katt Escalona is a 43 y.o. female who presents for evaluation of migraine.  After examining patient, I am concerned she is experiencing intractable chronic migraines without aura and without status migrainosus according International classification of headache disorders.  Additionally I am concerned of a rebound headache due to the significant use of Fioricet, Zofran as well as Tylenol.  I did discuss this with her.  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  If papilledema is present, this will change the course of her care to include lumbar puncture and changing medication regimen to include Diamox.  However at this time there are no red flags in relation to her migraine headaches or daily headaches.  I would recommend adding  Ajovy on as a preventative regimen.  She may discontinue topiramate as currently instructed by her other provider.  I have reviewed her imaging reports as noted above.  No indication for repeat imaging at this time.    Diagnoses and all orders for this visit:    1. Intractable chronic migraine without aura and without status migrainosus (Primary)  -     Fremanezumab-vfrm (Ajovy) 225 MG/1.5ML solution auto-injector; Inject 225 mg under the skin into the appropriate area as directed Every 30 (Thirty) Days.  Dispense: 1.5 mL; Refill: 11  -     Fremanezumab-vfrm (Ajovy) 225 MG/1.5ML solution auto-injector; Inject 225 mg under the skin into the appropriate area as directed Every 30 (Thirty) Days.  Dispense: 1.5 mL; Refill: 0  -     Ambulatory Referral to Ophthalmology    2. Medication overuse headache  -     predniSONE (DELTASONE) 20 MG tablet; Take 1 tablet by mouth 3 (Three) Times a Day for 5 days.  Dispense: 15 tablet; Refill: 0        Plan:  Stop use of Fioricet.  Stop use of Tylenol.  Stop use of Zofran.  Start prednisone 20 mg tablet.  Take 1 tablet 3 times daily for 5 days.  Start Ajovy 225 mg monthly injection.  Patient provided with sample today.  Demonstration was shown and patient verbalized understanding.  Start riboflavin 400 mg daily.  Continue B[facility] per [facility] pain management.  Continue Qulipta per [facility] pain management.  Continue Ubrelvy per [facility] pain management.  Referral to ophthalmology for formal eye exam with special attention to assessing for papilledema.  Improve sleep hygiene.  Recommend PCP to consider sleep medicine referral.  Avoid known triggers.  Defer assessment of neck pain and bilateral arm numbness with known degenerative cervical spine to PCP or other specialist.  Our clinic does not manage this.  Follow-up with PCP as scheduled.    Follow-up in our clinic in 6 months or sooner if needed.    The patient and I have discussed the plan of care and she is in full  agreement at this time.   I spent a total of 65 minutes for this encounter today.  This includes reviewing prior medical records, prior imaging, obtaining a thorough HPI, assessment of patient, developing a plan of care with the patient, patient discussion, patient education as well as documentation.  Follow Up   Return in about 6 months (around 2/2/2025) for Migraine.            Nathan Bustamante, HARMONY  08/02/24  09:21 CDT

## 2024-08-02 NOTE — PATIENT INSTRUCTIONS
Get riboflavin B2 400mg daily   Stop Fioricet   Limit tylenolol  Improve sleep   Prednisone 60mg daily for 5 days

## 2024-08-13 DIAGNOSIS — F90.9 ATTENTION DEFICIT HYPERACTIVITY DISORDER (ADHD), UNSPECIFIED ADHD TYPE: ICD-10-CM

## 2024-08-13 DIAGNOSIS — F41.9 ANXIETY: ICD-10-CM

## 2024-08-13 RX ORDER — DEXTROAMPHETAMINE SACCHARATE, AMPHETAMINE ASPARTATE MONOHYDRATE, DEXTROAMPHETAMINE SULFATE AND AMPHETAMINE SULFATE 7.5; 7.5; 7.5; 7.5 MG/1; MG/1; MG/1; MG/1
30 CAPSULE, EXTENDED RELEASE ORAL DAILY
Qty: 30 CAPSULE | Refills: 0 | Status: SHIPPED | OUTPATIENT
Start: 2024-08-13 | End: 2024-09-12

## 2024-08-13 RX ORDER — CLONAZEPAM 1 MG/1
TABLET ORAL
Qty: 45 TABLET | Refills: 0 | Status: SHIPPED | OUTPATIENT
Start: 2024-08-13 | End: 2024-09-10

## 2024-08-13 NOTE — TELEPHONE ENCOUNTER
PDMP Monitoring:    Last PDMP Greg as Reviewed ([place]):  Review User Review Instant Review Result   SUKHWINDER SLADE 7/15/2024 12:02 PM Reviewed PDMP [1]     Urine Drug Screenings (1 yr)       POCT Rapid Drug Screen  Collected: 8/9/2023  2:08 PM (Final result)                  Medication Contract and Consent for Opioid Use Documents Filed        No documents found

## 2024-08-13 NOTE — TELEPHONE ENCOUNTER
Dionne Guzman called to request a refill on her medication.      Last office visit : 3/26/2024   Next office visit : 8/13/2024     Last UDS:   Benzodiazepine Screen, Urine   Date Value Ref Range Status   08/09/2023 n  Final     Buprenorphine Urine   Date Value Ref Range Status   08/09/2023 n  Final     Cocaine Metabolite Screen, Urine   Date Value Ref Range Status   08/09/2023 n  Final     Gabapentin Screen, Urine   Date Value Ref Range Status   08/09/2023 n  Final     Oxycodone Screen, Ur   Date Value Ref Range Status   08/09/2023 n  Final     Propoxyphene Screen, Urine   Date Value Ref Range Status   08/09/2023 n  Final     THC Screen, Urine   Date Value Ref Range Status   08/09/2023 n  Final     Tricyclic Antidepressants, Urine   Date Value Ref Range Status   08/09/2023 n  Final         Medication Contract: na   Last Fill:  4 weeks ago (7/15/2024) by Marla Cortez MD    Requested Prescriptions     Pending Prescriptions Disp Refills    amphetamine-dextroamphetamine (ADDERALL XR) 30 MG extended release capsule 30 capsule 0     Sig: Take 1 capsule by mouth daily for 30 days.         Please approve or refuse this medication.   Ronda Andrew LPN

## 2024-08-15 ENCOUNTER — HOSPITAL ENCOUNTER (OUTPATIENT)
Dept: PAIN MANAGEMENT | Age: 44
Discharge: HOME | End: 2024-08-15
Payer: COMMERCIAL

## 2024-08-15 VITALS
SYSTOLIC BLOOD PRESSURE: 125 MMHG | OXYGEN SATURATION: 100 % | RESPIRATION RATE: 16 BRPM | TEMPERATURE: 96.9 F | HEART RATE: 87 BPM | DIASTOLIC BLOOD PRESSURE: 83 MMHG

## 2024-08-15 DIAGNOSIS — M62.838 MUSCLE SPASMS OF NECK: Primary | ICD-10-CM

## 2024-08-15 PROCEDURE — 2500000003 HC RX 250 WO HCPCS

## 2024-08-15 PROCEDURE — 6360000002 HC RX W HCPCS

## 2024-08-15 PROCEDURE — 20553 NJX 1/MLT TRIGGER POINTS 3/>: CPT

## 2024-08-15 RX ORDER — LIDOCAINE HYDROCHLORIDE 10 MG/ML
10 INJECTION, SOLUTION EPIDURAL; INFILTRATION; INTRACAUDAL; PERINEURAL ONCE
Status: DISCONTINUED | OUTPATIENT
Start: 2024-08-15 | End: 2024-08-17 | Stop reason: HOSPADM

## 2024-08-15 RX ORDER — BUPIVACAINE HYDROCHLORIDE 5 MG/ML
10 INJECTION, SOLUTION EPIDURAL; INTRACAUDAL ONCE
Status: DISCONTINUED | OUTPATIENT
Start: 2024-08-15 | End: 2024-08-17 | Stop reason: HOSPADM

## 2024-08-15 NOTE — PROCEDURES
[facility]    Patient Name: Dionne Guzman    : 1980    Age: 43 y.o.    Sex: female    Date: August 15, 2024    Pre-op Diagnosis: Myofascial Pain/ Muscle Spasms/ Cervicalgia    Post-op Diagnosis: Myofascial Pain/ Muscle Spasms/ Cervicalgia    Procedure: Cervical Trigger Point Injections    Performing Procedure: Karime DEL TORO    Patient Vitals for the past 24 hrs:   BP Temp Temp src Pulse Resp SpO2   08/15/24 0745 125/83 96.9 °F (36.1 °C) Temporal 87 16 100 %       Description of Procedure:    After a brief physical assessment and failure to improve with conservative measures the patient presented for Cervical Trigger Point Injections The indications, limitations and possible complications were discussed with the patient and the patient elected to proceed with the procedure.    After voluntary, informed and signed consent obtained the patient was placed in a seated position.     Appropriate time out was obtained per policy.     The area of maximal tenderness was palpated over the   bilaterally Cervical muscles - Splenius  Trapezius  Rhomboid The skin overlying these areas was marked with a skin marker. The skin overlying the proposed injection sites were then sprayed with Gebauer's Solution while protecting patient eyes. The areas were prepped using aseptic technique with CHG prep. Each trigger point of the Cervical muscles - Splenius  Trapezius  Rhomboid was injected with approximately 2 ml of a solution of 5 ml of 1% Lidocaine Plain and 5 ml of 0.5% Marcaine Plain    Discharge:  The patient tolerated the procedure well. There were no complications during the procedure and the patient was discharged home with discharge instructions.    The patient has been instructed to contact the office should there be any complications or questions to arise between today and their next appointment.    Plan:    Will return to the office as previously scheduled    ALVERTO Diaz -

## 2024-08-15 NOTE — DISCHARGE INSTRUCTIONS
will be numb for a few hours after the procedure    [] I understand if a steroid was used it will take effect in 3 - 7 days. I understand that as the numbing medication wears off, the pain may return until the steroids start working.    [x] I understand that today I will rest for the next 24 hours and drink plenty of water.    [] For Botox for Migraines please do not wear anything constricting around the forehead for 7-10 days post injection. Examples headband, hats, or bandana    [] For Botox for Spasticity start therapy for the affected limb in two weeks.    [] Additional instructions: ______________________________________________ ___________________________________________________________________    Sedation:  Was oral sedation given?    [] Yes  [x] No    I understand that if I took an oral nerve calming medication I will not drive for  [] 24 hours after taking the medication.    [x] I have received a copy of my discharge instructions and understand the above instructions to the best of my knowledge    Patient Discharged:  [x] Home  [] Hospital  [] Other  ____________________________________________    Via:  [x] Ambulatory  [] Wheelchair   [] Stretcher [] EMS       Accompanied By:   [] Significant other  [] Family Member  [] Friend   [] Hospital Staff  []  Ambulance Staff  [] Other_______________________________________________    Plan:  [] Will return to the office in   [] 1 month   [] 3 months for:  [] Procedure Follow-up  [] Office Visit   [] Planned Procedure        [] Printed AVS and given to patient.  [x] Patient has mychart and does not wish for AVS to be printed.      If you have questions, problems or concerns you may call [phone] and follow the prompts

## 2024-08-28 ENCOUNTER — TELEPHONE (OUTPATIENT)
Dept: NEUROLOGY | Facility: CLINIC | Age: 44
End: 2024-08-28

## 2024-08-29 DIAGNOSIS — G43.719 INTRACTABLE CHRONIC MIGRAINE WITHOUT AURA AND WITHOUT STATUS MIGRAINOSUS: Primary | ICD-10-CM

## 2024-09-03 ENCOUNTER — TELEPHONE (OUTPATIENT)
Dept: NEUROLOGY | Facility: CLINIC | Age: 44
End: 2024-09-03

## 2024-09-03 DIAGNOSIS — H53.9 VISION CHANGES: Primary | ICD-10-CM

## 2024-09-03 DIAGNOSIS — G43.719 INTRACTABLE CHRONIC MIGRAINE WITHOUT AURA AND WITHOUT STATUS MIGRAINOSUS: ICD-10-CM

## 2024-09-03 RX ORDER — TOPIRAMATE 200 MG/1
200 TABLET, FILM COATED ORAL 2 TIMES DAILY
Qty: 180 TABLET | Refills: 0 | Status: SHIPPED | OUTPATIENT
Start: 2024-09-03

## 2024-09-03 NOTE — TELEPHONE ENCOUNTER
Caller: LEANDER Triplett call back number: [phone]       What was the call regarding: PT NEEDS ANOTHER REF FOR OPHTHALMOLOGIST  AS THE ONE REF WAS SENT TO /THEY DON'T TAKE HER INS    Is it okay if the provider responds through MyChart: CALL TO ADVISE     PLEASE ADVISE.

## 2024-09-03 NOTE — TELEPHONE ENCOUNTER
Katt Escalona  Telephone Information:   Mobile [phone]     PATIENT CALLED WITH THE FOLLOWING INFORMATIVE ABOUT HER IN NETWORK PROVIDER.     [facility]  Address: [address], [place] [address]  Phone: [phone]

## 2024-09-06 ENCOUNTER — TELEPHONE (OUTPATIENT)
Dept: NEUROLOGY | Facility: CLINIC | Age: 44
End: 2024-09-06
Payer: COMMERCIAL

## 2024-09-06 DIAGNOSIS — G43.719 INTRACTABLE CHRONIC MIGRAINE WITHOUT AURA AND WITHOUT STATUS MIGRAINOSUS: Primary | ICD-10-CM

## 2024-09-06 RX ORDER — ATOGEPANT 60 MG/1
60 TABLET ORAL DAILY
Qty: 8 TABLET | Refills: 0 | COMMUNITY
Start: 2024-09-06

## 2024-09-06 NOTE — TELEPHONE ENCOUNTER
PATIENT CALLING REGARDING MEDICATION    SHE STATES SHE GETS MEDICATIONS FROM PAIN MANAGEMENT.  SHE HAS BEEN UNABLE TO REACH THEM SINCE MONDAY AND IS NOW GOING TO BE OUT OF MEDICATION.    SHE IS REQUESTING NEUROLOGY  THESE REFILLS FOR HER OF UBRELVY AND QULIPTA    PLEASE ADVISE PATIENT

## 2024-09-06 NOTE — TELEPHONE ENCOUNTER
I spoke with the patient and did let her know we can supply her with Quilpta and Ubrelvy samples. I did let her know we cannot take over Pain center's medication.  She voices understanding and is appreciative of the samples.  She will contact Pain Management again on Monday.

## 2024-09-06 NOTE — TELEPHONE ENCOUNTER
Please give patient samples of Qulipta 60mg tablets (8 tablets) and Ubrelvy 100 mg tablets, (2 tablets).  Have patient contact pain management on Monday for clarification if they will allow us to take this over.  I am fine to prescribe.

## 2024-09-09 ENCOUNTER — TELEPHONE (OUTPATIENT)
Dept: PAIN MANAGEMENT | Age: 44
End: 2024-09-09

## 2024-09-11 DIAGNOSIS — F41.9 ANXIETY: ICD-10-CM

## 2024-09-11 DIAGNOSIS — F90.9 ATTENTION DEFICIT HYPERACTIVITY DISORDER (ADHD), UNSPECIFIED ADHD TYPE: ICD-10-CM

## 2024-09-11 RX ORDER — ONDANSETRON 8 MG/1
8 TABLET, FILM COATED ORAL EVERY 8 HOURS PRN
Qty: 20 TABLET | Refills: 1 | Status: SHIPPED | OUTPATIENT
Start: 2024-09-11

## 2024-09-11 RX ORDER — DEXTROAMPHETAMINE SACCHARATE, AMPHETAMINE ASPARTATE MONOHYDRATE, DEXTROAMPHETAMINE SULFATE AND AMPHETAMINE SULFATE 7.5; 7.5; 7.5; 7.5 MG/1; MG/1; MG/1; MG/1
30 CAPSULE, EXTENDED RELEASE ORAL DAILY
Qty: 30 CAPSULE | Refills: 0 | Status: SHIPPED | OUTPATIENT
Start: 2024-09-11 | End: 2024-10-11

## 2024-09-11 RX ORDER — CLONAZEPAM 1 MG/1
TABLET ORAL
Qty: 45 TABLET | Refills: 0 | Status: SHIPPED | OUTPATIENT
Start: 2024-09-11 | End: 2024-10-09

## 2024-09-18 ENCOUNTER — OFFICE VISIT (OUTPATIENT)
Dept: PRIMARY CARE CLINIC | Age: 44
End: 2024-09-18
Payer: COMMERCIAL

## 2024-09-18 VITALS
TEMPERATURE: 98.3 F | DIASTOLIC BLOOD PRESSURE: 78 MMHG | HEART RATE: 100 BPM | RESPIRATION RATE: 16 BRPM | WEIGHT: 121 LBS | BODY MASS INDEX: 19.44 KG/M2 | SYSTOLIC BLOOD PRESSURE: 120 MMHG | OXYGEN SATURATION: 100 % | HEIGHT: 66 IN

## 2024-09-18 DIAGNOSIS — R42 DIZZINESS: ICD-10-CM

## 2024-09-18 DIAGNOSIS — R42 LIGHTHEADED: ICD-10-CM

## 2024-09-18 DIAGNOSIS — R11.0 NAUSEA: ICD-10-CM

## 2024-09-18 DIAGNOSIS — G43.909 MIGRAINE WITHOUT STATUS MIGRAINOSUS, NOT INTRACTABLE, UNSPECIFIED MIGRAINE TYPE: Primary | ICD-10-CM

## 2024-09-18 LAB
ALBUMIN SERPL-MCNC: 4 G/DL (ref 3.5–5.2)
ALP SERPL-CCNC: 36 U/L (ref 35–104)
ALT SERPL-CCNC: 7 U/L (ref 5–33)
ANION GAP SERPL CALCULATED.3IONS-SCNC: 13 MMOL/L (ref 7–19)
AST SERPL-CCNC: 8 U/L (ref 5–32)
BASOPHILS # BLD: 0 K/UL (ref 0–0.2)
BASOPHILS NFR BLD: 0.5 % (ref 0–1)
BILIRUB SERPL-MCNC: 0.2 MG/DL (ref 0.2–1.2)
BUN SERPL-MCNC: 20 MG/DL (ref 6–20)
CALCIUM SERPL-MCNC: 8.3 MG/DL (ref 8.6–10)
CHLORIDE SERPL-SCNC: 108 MMOL/L (ref 98–111)
CO2 SERPL-SCNC: 19 MMOL/L (ref 22–29)
CREAT SERPL-MCNC: 0.8 MG/DL (ref 0.5–0.9)
EOSINOPHIL # BLD: 0 K/UL (ref 0–0.6)
EOSINOPHIL NFR BLD: 0.4 % (ref 0–5)
ERYTHROCYTE [DISTWIDTH] IN BLOOD BY AUTOMATED COUNT: 12 % (ref 11.5–14.5)
FERRITIN SERPL-MCNC: 34.4 NG/ML (ref 13–150)
GLUCOSE SERPL-MCNC: 84 MG/DL (ref 70–99)
HCT VFR BLD AUTO: 44.9 % (ref 37–47)
HGB BLD-MCNC: 14 G/DL (ref 12–16)
IMM GRANULOCYTES # BLD: 0 K/UL
IRON SATN MFR SERPL: 38 % (ref 14–50)
IRON SERPL-MCNC: 124 UG/DL (ref 37–145)
LYMPHOCYTES # BLD: 1.2 K/UL (ref 1.1–4.5)
LYMPHOCYTES NFR BLD: 20.7 % (ref 20–40)
MCH RBC QN AUTO: 31 PG (ref 27–31)
MCHC RBC AUTO-ENTMCNC: 31.2 G/DL (ref 33–37)
MCV RBC AUTO: 99.3 FL (ref 81–99)
MONOCYTES # BLD: 0.3 K/UL (ref 0–0.9)
MONOCYTES NFR BLD: 5.3 % (ref 0–10)
NEUTROPHILS # BLD: 4.1 K/UL (ref 1.5–7.5)
NEUTS SEG NFR BLD: 72.9 % (ref 50–65)
PLATELET # BLD AUTO: 148 K/UL (ref 130–400)
PMV BLD AUTO: 9.8 FL (ref 9.4–12.3)
POTASSIUM SERPL-SCNC: 4.1 MMOL/L (ref 3.5–5)
PROT SERPL-MCNC: 6.3 G/DL (ref 6.4–8.3)
RBC # BLD AUTO: 4.52 M/UL (ref 4.2–5.4)
SODIUM SERPL-SCNC: 140 MMOL/L (ref 136–145)
TIBC SERPL-MCNC: 330 UG/DL (ref 250–400)
TSH SERPL DL<=0.005 MIU/L-ACNC: 2.53 UIU/ML (ref 0.27–4.2)
WBC # BLD AUTO: 5.7 K/UL (ref 4.8–10.8)

## 2024-09-18 PROCEDURE — 99213 OFFICE O/P EST LOW 20 MIN: CPT | Performed by: FAMILY MEDICINE

## 2024-09-18 RX ORDER — FREMANEZUMAB-VFRM 225 MG/1.5ML
INJECTION SUBCUTANEOUS
COMMUNITY

## 2024-09-18 RX ORDER — UBROGEPANT 100 MG/1
TABLET ORAL
COMMUNITY

## 2024-09-18 ASSESSMENT — ENCOUNTER SYMPTOMS
VOMITING: 0
EYE DISCHARGE: 0
NAUSEA: 0
COLOR CHANGE: 0
ABDOMINAL PAIN: 0
WHEEZING: 0
DIARRHEA: 0
BACK PAIN: 0
COUGH: 0

## 2024-10-03 ENCOUNTER — HOSPITAL ENCOUNTER (OUTPATIENT)
Dept: PAIN MANAGEMENT | Age: 44
Discharge: HOME | End: 2024-10-03
Payer: COMMERCIAL

## 2024-10-03 VITALS
DIASTOLIC BLOOD PRESSURE: 71 MMHG | SYSTOLIC BLOOD PRESSURE: 106 MMHG | HEART RATE: 96 BPM | OXYGEN SATURATION: 100 % | TEMPERATURE: 97.1 F | RESPIRATION RATE: 18 BRPM

## 2024-10-03 DIAGNOSIS — G43.109 MIGRAINE WITH AURA AND WITHOUT STATUS MIGRAINOSUS, NOT INTRACTABLE: Primary | ICD-10-CM

## 2024-10-03 PROCEDURE — 64615 CHEMODENERV MUSC MIGRAINE: CPT

## 2024-10-03 PROCEDURE — 6360000002 HC RX W HCPCS

## 2024-10-03 PROCEDURE — 2580000003 HC RX 258

## 2024-10-03 RX ORDER — SODIUM CHLORIDE 9 MG/ML
4.5 INJECTION, SOLUTION INTRAMUSCULAR; INTRAVENOUS; SUBCUTANEOUS ONCE
OUTPATIENT
Start: 2024-10-03 | End: 2024-10-03

## 2024-10-03 NOTE — PROGRESS NOTES
[facility]   [address]  [place] [address]  [phone] p  [phone]    Procedure:  Level of Consciousness: [x]Alert [x]Oriented []Disoriented []Lethargic  Anxiety Level: [x]Calm []Anxious []Depressed []Other  Skin: [x]Warm [x]Dry []Cool []Moist []Intact []Other  Cardiovascular: []Palpitations: [x]Never []Occasionally []Frequently  Chest Pain: [x]No []Yes  Respiratory:  [x]Unlabored []Labored []Cough ([] Productive []Unproductive)  HCG Required: [x]No []Yes   Results: []Negative []Positive  Knowledge Level:        [x]Patient/Other verbalized understanding of pre-procedure instructions.        [x]Assessment of post-op care needs (transportation, responsible caregiver)        [x]Able to discuss health care problems and how to deal with it.  Factors that Affect Teaching:        Language Barrier: [x]No []Yes - why:        Hearing Loss:        [x]No []Yes            Corrective Device:  []Yes []No        Vision Loss:           [x]No []Yes            Corrective Device:  []Yes []No        Memory Loss:       [x]No []Yes            []Short Term []Long Term  Motivational Level:  [x]Asks Questions                  []Extremely Anxious       [x]Seems Interested               []Seems Uninterested                  []Denies need for Education  Risk for Injury:  [x]Patient oriented to person, place and time  []History of frequent falls/loss of balance  Nutritional:  []Change in appetite   []Weight Gain   []Weight Loss  Functional:  []Requires assistance with ADL's      Migraine  Follow up Assessment:  Patient experiences *20* headaches per month  Patient states that he/she has  *20* headaches out of 30 days each month.  Patient states that he/she has *20* migraines out of 30 days each month.  Patient has experienced a  reduction in Migraine headaches less than 15 days per month []Yes [x]No  Patient has experienced a reduction in Migraine hours []Yes [x]No

## 2024-10-03 NOTE — PROCEDURES
PROCEDURE NOTE  Date: 10/3/2024   Name: Dionne Guzman  YOB: 1980    Procedures  [facility]    Patient Name: Dionne Guzman    : 1980                    Age: 43 y.o.    Sex: female    Date: October 3, 2024    Pre-op Diagnosis: Chronic Migraines    Post-op Diagnosis: Chronic Migraines    Procedure: Botox injections x 155 units (45 units wasted)    Performing Procedure:  Karime Adhikari APRN - CNP    Patient Vitals for the past 24 hrs:   BP Temp Temp src Pulse Resp SpO2   10/03/24 0954 106/71 97.1 °F (36.2 °C) Temporal 96 18 100 %       Previous Injections:  Patient had 18/8 headaches/migraines over the past month.     Indications:    Dionne Guzman has been treated for problems with chronic migraine headaches. The patient was taken through a conservative course of treatment without complete resolution of symptoms. Botox injection therapy was offered and after the risks and benefits of the procedure were explained to the patient, we had agreed to proceed.    The patient was taken to the procedure room and placed in the seated position. The following muscles were identified using palpation and standard landmarks. These muscles were marked and prepped with alcohol. A total of 155 units were injected after careful aspiration and the following distribution bilaterally:  10 units in 2 sites, procerus 5 units in one site, frontalis 20 units in 4 sites, temporalis 40 unit in 8 sites, occipitals 30 units in 6 sites, cervical paraspinals 20 units in 4 sites, and trapezius 30 units in 6 sites.    Discharge:  The patient tolerated the procedure well. There were no complications during the procedure and the patient was discharged home with discharge instructions.    The patient has been instructed to contact the office should there be any complications or questions to arise between today and their next appointment.    Plan:  [] Will return to the office in   [] 1

## 2024-10-08 DIAGNOSIS — F90.9 ATTENTION DEFICIT HYPERACTIVITY DISORDER (ADHD), UNSPECIFIED ADHD TYPE: ICD-10-CM

## 2024-10-08 DIAGNOSIS — F41.9 ANXIETY: ICD-10-CM

## 2024-10-08 RX ORDER — CLONAZEPAM 1 MG/1
TABLET ORAL
Qty: 45 TABLET | Refills: 0 | Status: SHIPPED | OUTPATIENT
Start: 2024-10-08 | End: 2024-11-05

## 2024-10-08 RX ORDER — DEXTROAMPHETAMINE SACCHARATE, AMPHETAMINE ASPARTATE MONOHYDRATE, DEXTROAMPHETAMINE SULFATE AND AMPHETAMINE SULFATE 7.5; 7.5; 7.5; 7.5 MG/1; MG/1; MG/1; MG/1
30 CAPSULE, EXTENDED RELEASE ORAL DAILY
Qty: 30 CAPSULE | Refills: 0 | Status: SHIPPED | OUTPATIENT
Start: 2024-10-08 | End: 2024-11-07

## 2024-10-14 ENCOUNTER — HOSPITAL ENCOUNTER (OUTPATIENT)
Dept: MRI IMAGING | Age: 44
Discharge: HOME | End: 2024-10-14
Payer: COMMERCIAL

## 2024-10-14 DIAGNOSIS — G43.909 MIGRAINE WITHOUT STATUS MIGRAINOSUS, NOT INTRACTABLE, UNSPECIFIED MIGRAINE TYPE: ICD-10-CM

## 2024-10-14 PROCEDURE — 70551 MRI BRAIN STEM W/O DYE: CPT

## 2024-10-17 ENCOUNTER — PATIENT MESSAGE (OUTPATIENT)
Dept: PRIMARY CARE CLINIC | Age: 44
End: 2024-10-17

## 2024-10-21 ENCOUNTER — TELEPHONE (OUTPATIENT)
Dept: NEUROSURGERY | Age: 44
End: 2024-10-21

## 2024-10-21 ENCOUNTER — HOSPITAL ENCOUNTER (OUTPATIENT)
Dept: PAIN MANAGEMENT | Age: 44
Discharge: HOME | End: 2024-10-21
Payer: COMMERCIAL

## 2024-10-21 VITALS
DIASTOLIC BLOOD PRESSURE: 71 MMHG | RESPIRATION RATE: 16 BRPM | TEMPERATURE: 97.1 F | HEART RATE: 91 BPM | BODY MASS INDEX: 18.96 KG/M2 | SYSTOLIC BLOOD PRESSURE: 103 MMHG | OXYGEN SATURATION: 100 % | WEIGHT: 118 LBS | HEIGHT: 66 IN

## 2024-10-21 DIAGNOSIS — G43.109 MIGRAINE WITH AURA AND WITHOUT STATUS MIGRAINOSUS, NOT INTRACTABLE: ICD-10-CM

## 2024-10-21 DIAGNOSIS — M62.838 MUSCLE SPASMS OF NECK: ICD-10-CM

## 2024-10-21 DIAGNOSIS — M54.12 CERVICAL RADICULOPATHY: ICD-10-CM

## 2024-10-21 DIAGNOSIS — G43.109 MIGRAINE WITH AURA AND WITHOUT STATUS MIGRAINOSUS, NOT INTRACTABLE: Primary | ICD-10-CM

## 2024-10-21 DIAGNOSIS — M54.12 CERVICAL RADICULOPATHY: Primary | ICD-10-CM

## 2024-10-21 PROCEDURE — 99214 OFFICE O/P EST MOD 30 MIN: CPT

## 2024-10-21 PROCEDURE — 99213 OFFICE O/P EST LOW 20 MIN: CPT

## 2024-10-21 RX ORDER — CYCLOBENZAPRINE HCL 10 MG
10 TABLET ORAL 3 TIMES DAILY PRN
Qty: 90 TABLET | Refills: 2 | Status: SHIPPED | OUTPATIENT
Start: 2024-10-21 | End: 2025-01-19

## 2024-10-21 RX ORDER — ATOGEPANT 60 MG/1
1 TABLET ORAL DAILY
Qty: 30 TABLET | Refills: 2 | Status: SHIPPED | OUTPATIENT
Start: 2024-10-21

## 2024-10-21 ASSESSMENT — ENCOUNTER SYMPTOMS
NAUSEA: 1
PHOTOPHOBIA: 1
CONSTIPATION: 0
VISUAL CHANGE: 1
SCALP TENDERNESS: 1

## 2024-10-21 ASSESSMENT — PAIN DESCRIPTION - ORIENTATION
ORIENTATION: LOWER
ORIENTATION_2: LEFT;RIGHT

## 2024-10-21 ASSESSMENT — PAIN DESCRIPTION - LOCATION
LOCATION: NECK
LOCATION_2: HEAD

## 2024-10-21 ASSESSMENT — PAIN SCALES - GENERAL: PAINLEVEL_OUTOF10: 8

## 2024-10-21 ASSESSMENT — PAIN DESCRIPTION - INTENSITY: RATING_2: 10

## 2024-10-21 ASSESSMENT — PAIN DESCRIPTION - PAIN TYPE: TYPE: CHRONIC PAIN

## 2024-10-21 NOTE — TELEPHONE ENCOUNTER
1. Migraine with aura and without status migrainosus, not intractable      Requested Prescriptions     Pending Prescriptions Disp Refills    Atogepant (QULIPTA) 60 MG TABS 30 tablet 2     Sig: Take 1 tablet by mouth daily       Continue medication with refill sent at appointment yes; refill sent to medical director at appointment no, see refill encounter dated 10/21/2024    Electronically signed by ALVERTO Diaz CNP on 10/21/2024 at 8:22 AM

## 2024-10-21 NOTE — PROGRESS NOTES
Transportation Needs: Unknown (3/26/2024)    PRAPARE - Transportation     Lack of Transportation (Non-Medical): No    Received from [facility]    Family and Community Support    Received from [facility], [facility]    Abuse Screen   Housing Stability: Unknown (3/26/2024)    Housing Stability Vital Sign     Unstable Housing in the Last Year: No         Family History  family history includes Breast Cancer in her maternal grandmother; Esophageal Cancer in her paternal uncle; Ovarian Cancer in her maternal grandmother; Pancreatic Cancer in her paternal grandmother.    Review of Systems:  Review of Systems   Constitutional:  Positive for activity change.   Eyes:  Positive for photophobia.   Gastrointestinal:  Positive for nausea. Negative for constipation.   Musculoskeletal:  Positive for arthralgias, myalgias and neck pain.   Neurological:  Positive for headaches.   Psychiatric/Behavioral:  Positive for decreased concentration and sleep disturbance. Negative for agitation, self-injury and suicidal ideas.    All other systems reviewed and are negative.       14 point ROS negative besides that noted in HPI    Physical exam:     Vitals:    10/21/24 0806   BP: 103/71   Pulse: 91   Resp: 16   Temp: 97.1 °F (36.2 °C)   TempSrc: Temporal   SpO2: 100%   Weight: 53.5 kg (118 lb)   Height: 1.676 m (5' 6\")       Body mass index is 19.05 kg/m².    Physical Exam  Vitals and nursing note reviewed.   Constitutional:       General: She is not in acute distress.     Appearance: Normal appearance. She is well-developed.   HENT:      Head: Normocephalic.      Right Ear: External ear normal.      Left Ear: External ear normal.      Nose: Nose normal.   Eyes:      Conjunctiva/sclera: Conjunctivae normal.      Pupils: Pupils are equal, round, and reactive to light.   Neck:      Vascular: No JVD.      Trachea: No tracheal deviation.   Cardiovascular:      Rate and

## 2024-10-21 NOTE — PROGRESS NOTES
Clinic Documentation      Education Provided:  [x] Review of Jamel  [] Agreement Review  [x] PEG Score Calculated [] PHQ Score Calculated [] ORT Score Calculated    [] Compliance Issues Discussed [] Cognitive Behavior Needs [x] Exercise [] Review of Test [] Financial Issues  [x] Tobacco/Alcohol Use Reviewed [x] Teaching [] New Patient [] Picture Obtained    Physician Plan:  [] Outgoing Referral  [] Pharmacy Consult  [] Test Ordered [x] Prescription Ordered/Changed   [] Obtained Test Results / Consult Notes        Complete if patient is withholding blood thinner for procedure     Blood Thinner Patient is currently taking:      [] Plavix (Hold for 7 days)  [] Aspirin (Hold for 5 days)     [] Pletal (Hold for 2 days)  [] Pradaxa (Hold for 3 days)    [] Effient (Hold for 7 days)  [] Xarelto (Hold for 2 days)    [] Eliquis (Hold for 2 days)  [] Brilinta (Hold for 7 days)    [] Coumadin (Hold for 5 days) - (INR needs to be drawn the day prior to procedure- INR < 2.0)    [] Aggrenox (Hold for 7 days)        [] Patient will stop medication on their own.    [] Blood Thinner Form Faxed for approval to hold.   Provider form faxed to:     Assessment Completed by:  Electronically signed by Krista Sanon MA on 10/21/2024 at 8:31 AM

## 2024-10-21 NOTE — TELEPHONE ENCOUNTER
[facility] New Patient Questionnaire    Diagnosis/Reason for Referral?     DX: M54.12 (ICD-10-CM) - Cervical radiculopathy     2. Who is completing questionnaire?      Patient X Caregiver Family      3. Has the patient had any previous spinal/brain surgeries?     NO      A. If yes, what is the name of the facility in which the surgery was performed?       B. Procedure/Surgery performed?       C. Who was the surgeon?       D. When was the surgery?    MM/YY       E. Did the patient improve after the surgery?        4. Is this a second opinion?   If yes, Dr. Poe would like to review patient first before making the appointment.      5. Have MRI Images been obtain within the last year?     Yes  No (NEEDS XR)       XR  CT     If yes, where was the imaging performed?     If yes, what part of the body?     Lumbar  Cervical  Thoracic  Brain     If yes, when was it obtained?      MM/YY    Note: if the scan was performed at a facility other than [facility], the disc will need to be brought to the appointment or we need to reach out to obtain the disc.     A. Was the patient instructed to provide the disc?      Yes   No X      8. Has the patient had a NCV/EMG within the last year?      Yes  No X     If yes, where was it performed and date?      MM/YY  Location:      9. Has the patient been to Physical Therapy?      Yes  No X     If yes, what location, how long attended, and last visit?    Location:        Therapy Lasted:    Date of Last Visit:      10. Has the patient been to Pain Management?     Yes X  No     If yes, what location and last visit     Location: [facility]   Last Visit: 10/3/2024   Is it helping?

## 2024-10-23 ENCOUNTER — PATIENT MESSAGE (OUTPATIENT)
Dept: PRIMARY CARE CLINIC | Age: 44
End: 2024-10-23

## 2024-10-23 DIAGNOSIS — G43.909 MIGRAINE WITHOUT STATUS MIGRAINOSUS, NOT INTRACTABLE, UNSPECIFIED MIGRAINE TYPE: Primary | ICD-10-CM

## 2024-11-09 DIAGNOSIS — F41.9 ANXIETY: ICD-10-CM

## 2024-11-09 DIAGNOSIS — F90.9 ATTENTION DEFICIT HYPERACTIVITY DISORDER (ADHD), UNSPECIFIED ADHD TYPE: ICD-10-CM

## 2024-11-11 RX ORDER — DEXTROAMPHETAMINE SACCHARATE, AMPHETAMINE ASPARTATE MONOHYDRATE, DEXTROAMPHETAMINE SULFATE AND AMPHETAMINE SULFATE 7.5; 7.5; 7.5; 7.5 MG/1; MG/1; MG/1; MG/1
30 CAPSULE, EXTENDED RELEASE ORAL DAILY
Qty: 30 CAPSULE | Refills: 0 | Status: SHIPPED | OUTPATIENT
Start: 2024-11-11 | End: 2024-12-11

## 2024-11-11 RX ORDER — CLONAZEPAM 1 MG/1
TABLET ORAL
Qty: 45 TABLET | Refills: 0 | Status: SHIPPED | OUTPATIENT
Start: 2024-11-11 | End: 2024-12-07

## 2024-11-11 NOTE — TELEPHONE ENCOUNTER
Provider needs to review PDMP    PDMP Monitoring:    Last PDMP Greg as Reviewed ([place]):  Review User Review Instant Review Result   SUKHWINDER SLADE 7/15/2024 12:02 PM Reviewed PDMP [1]     Urine Drug Screenings (1 yr)       POCT Rapid Drug Screen  Collected: 8/9/2023  2:08 PM (Final result)                  Medication Contract and Consent for Opioid Use Documents Filed        No documents found

## 2024-11-14 ENCOUNTER — PATIENT MESSAGE (OUTPATIENT)
Dept: PRIMARY CARE CLINIC | Age: 44
End: 2024-11-14

## 2024-11-14 DIAGNOSIS — R51.9 ACUTE NONINTRACTABLE HEADACHE, UNSPECIFIED HEADACHE TYPE: ICD-10-CM

## 2024-11-14 RX ORDER — BUTALBITAL, ACETAMINOPHEN AND CAFFEINE 50; 325; 40 MG/1; MG/1; MG/1
1 TABLET ORAL EVERY 6 HOURS PRN
Qty: 30 TABLET | Refills: 0 | Status: SHIPPED | OUTPATIENT
Start: 2024-11-14

## 2024-11-25 RX ORDER — NORETHINDRONE ACETATE AND ETHINYL ESTRADIOL AND FERROUS FUMARATE 1.5-30(21)
1 KIT ORAL DAILY
Qty: 3 PACKET | Refills: 3 | Status: SHIPPED | OUTPATIENT
Start: 2024-11-25

## 2024-11-25 RX ORDER — TOPIRAMATE 200 MG/1
200 TABLET, FILM COATED ORAL 2 TIMES DAILY
Qty: 180 TABLET | Refills: 0 | Status: SHIPPED | OUTPATIENT
Start: 2024-11-25

## 2024-12-09 ENCOUNTER — OFFICE VISIT (OUTPATIENT)
Dept: NEUROSURGERY | Age: 44
End: 2024-12-09
Payer: COMMERCIAL

## 2024-12-09 ENCOUNTER — HOSPITAL ENCOUNTER (OUTPATIENT)
Dept: GENERAL RADIOLOGY | Age: 44
Discharge: HOME | End: 2024-12-09
Payer: COMMERCIAL

## 2024-12-09 VITALS
DIASTOLIC BLOOD PRESSURE: 79 MMHG | WEIGHT: 122 LBS | SYSTOLIC BLOOD PRESSURE: 117 MMHG | HEIGHT: 66 IN | HEART RATE: 103 BPM | BODY MASS INDEX: 19.61 KG/M2

## 2024-12-09 DIAGNOSIS — M50.30 DDD (DEGENERATIVE DISC DISEASE), CERVICAL: ICD-10-CM

## 2024-12-09 DIAGNOSIS — R51.9 FREQUENT HEADACHES: ICD-10-CM

## 2024-12-09 DIAGNOSIS — M50.20 CERVICAL DISC HERNIATION: Primary | ICD-10-CM

## 2024-12-09 DIAGNOSIS — M54.12 CERVICAL RADICULOPATHY: ICD-10-CM

## 2024-12-09 DIAGNOSIS — M54.2 NECK PAIN: ICD-10-CM

## 2024-12-09 DIAGNOSIS — R20.0 BILATERAL HAND NUMBNESS: ICD-10-CM

## 2024-12-09 PROCEDURE — 99214 OFFICE O/P EST MOD 30 MIN: CPT | Performed by: NURSE PRACTITIONER

## 2024-12-09 PROCEDURE — 72050 X-RAY EXAM NECK SPINE 4/5VWS: CPT

## 2024-12-09 ASSESSMENT — ENCOUNTER SYMPTOMS
GASTROINTESTINAL NEGATIVE: 1
RESPIRATORY NEGATIVE: 1
EYES NEGATIVE: 1

## 2024-12-09 NOTE — PROGRESS NOTES
[facility]  Office Visit      Chief Complaint   Patient presents with    New Patient    Results     Review XR cervical     Neck Pain     Patient states that she has neck pain primarily on the left side of her neck. She states the pain does radiate into her head and she experiences debilitating headaches. Patient states that she does use a TENS unit but she doesn't think it helps much. Patient takes flexeril and tylenol OTC for pain relief. [facility] She has not tried PT.     Numbness     Patient states she has numbness and tingling in BUE throughout her arms and fingertips.        HISTORY OF PRESENT ILLNESS:    Dionne Guzman is a 44 y.o. female with a clotting disorder, ADHD, and migrainse who presents with neck pain that has been ongoing for a little over 2 years.  She is currently established with our pain management team here at [facility] and receives Botox for migraines. She currently see's [facility] for headaches and has been referred to [facility] for April of 2025.  She has apparently been evaluated by Dr. Lay in the past for her cervical spine.  MRI of the cervical spine in 2023 revealed a disc herniation/osteophyte at C5-6 with severe right-sided foraminal stenosis.  It was thought that she may have benefited from cervical epidurals however due to her clotting disorder they did not wish to proceed with injections. She is here for a second opinion.      The pain does radiate into the bilateral trapezius regions. Her pain is mostly located in the neck.  The patient complains of numbness of the bilateral 2nd, 3d, 4th digits. States they are very sensitive.  She does not have numbness in the fingertips, trouble using hands to perform fine motor tasks or ataxia.          The patient has underwent a non-operative treatment course that has included:  NSAIDs   told her not to take but does take sometimes  Tylenol  Muscle Relaxers - flexeril  Oral Steroids -

## 2024-12-09 NOTE — PROGRESS NOTES
Review of Systems   Constitutional: Negative.    HENT: Negative.     Eyes: Negative.    Respiratory: Negative.     Cardiovascular: Negative.    Gastrointestinal: Negative.    Genitourinary: Negative.    Musculoskeletal:  Positive for myalgias and neck pain.   Skin: Negative.    Neurological:  Positive for headaches.   Endo/Heme/Allergies: Negative.    Psychiatric/Behavioral: Negative.

## 2024-12-10 ENCOUNTER — NURSE ONLY (OUTPATIENT)
Dept: PRIMARY CARE CLINIC | Age: 44
End: 2024-12-10
Payer: COMMERCIAL

## 2024-12-10 DIAGNOSIS — F41.9 ANXIETY: ICD-10-CM

## 2024-12-10 DIAGNOSIS — Z79.899 MEDICATION MANAGEMENT: Primary | ICD-10-CM

## 2024-12-10 DIAGNOSIS — F90.9 ATTENTION DEFICIT HYPERACTIVITY DISORDER (ADHD), UNSPECIFIED ADHD TYPE: ICD-10-CM

## 2024-12-10 LAB
ALCOHOL URINE: ABNORMAL
AMPHETAMINE SCREEN URINE: POSITIVE
BARBITURATE SCREEN URINE: ABNORMAL
BENZODIAZEPINE SCREEN, URINE: ABNORMAL
BUPRENORPHINE URINE: ABNORMAL
COCAINE METABOLITE SCREEN URINE: ABNORMAL
FENTANYL SCREEN, URINE: ABNORMAL
GABAPENTIN SCREEN, URINE: ABNORMAL
MDMA, URINE: ABNORMAL
METHADONE SCREEN, URINE: ABNORMAL
METHAMPHETAMINE, URINE: ABNORMAL
OPIATE SCREEN URINE: ABNORMAL
OXYCODONE SCREEN URINE: ABNORMAL
PHENCYCLIDINE SCREEN URINE: ABNORMAL
PROPOXYPHENE SCREEN, URINE: ABNORMAL
SYNTHETIC CANNABINOIDS(K2) SCREEN, URINE: ABNORMAL
THC SCREEN, URINE: ABNORMAL
TRAMADOL SCREEN URINE: ABNORMAL
TRICYCLIC ANTIDEPRESSANTS, UR: ABNORMAL

## 2024-12-10 PROCEDURE — 80305 DRUG TEST PRSMV DIR OPT OBS: CPT | Performed by: FAMILY MEDICINE

## 2024-12-10 RX ORDER — CLONAZEPAM 1 MG/1
TABLET ORAL
Qty: 45 TABLET | Refills: 0 | Status: SHIPPED | OUTPATIENT
Start: 2024-12-10 | End: 2025-01-05

## 2024-12-10 RX ORDER — DEXTROAMPHETAMINE SACCHARATE, AMPHETAMINE ASPARTATE MONOHYDRATE, DEXTROAMPHETAMINE SULFATE AND AMPHETAMINE SULFATE 7.5; 7.5; 7.5; 7.5 MG/1; MG/1; MG/1; MG/1
30 CAPSULE, EXTENDED RELEASE ORAL DAILY
Qty: 30 CAPSULE | Refills: 0 | Status: SHIPPED | OUTPATIENT
Start: 2024-12-10 | End: 2025-01-09

## 2024-12-10 NOTE — TELEPHONE ENCOUNTER
Provider needs to review PDMP/messaged about UDS & contract    PDMP Monitoring:    Last PDMP Greg as Reviewed ([place]):  Review User Review Instant Review Result   SUKHWINDER SLADE 7/15/2024 12:02 PM Reviewed PDMP [1]     Urine Drug Screenings (1 yr)       POCT Rapid Drug Screen  Collected: 8/9/2023  2:08 PM (Final result)                  Medication Contract and Consent for Opioid Use Documents Filed        No documents found

## 2024-12-16 DIAGNOSIS — R51.9 ACUTE NONINTRACTABLE HEADACHE, UNSPECIFIED HEADACHE TYPE: ICD-10-CM

## 2024-12-16 RX ORDER — BUTALBITAL, ACETAMINOPHEN AND CAFFEINE 50; 325; 40 MG/1; MG/1; MG/1
1 TABLET ORAL EVERY 6 HOURS PRN
Qty: 30 TABLET | Refills: 0 | Status: SHIPPED | OUTPATIENT
Start: 2024-12-16

## 2024-12-16 NOTE — TELEPHONE ENCOUNTER
PDMP Monitoring:    Last PDMP Greg as Reviewed ([place]):  Review User Review Instant Review Result   SUKHWINDER SLADE 12/10/2024  2:25 PM Reviewed PDMP [1]     Urine Drug Screenings (1 yr)       POCT Rapid Drug Screen  Collected: 12/10/2024  2:16 PM (Final result)              POCT Rapid Drug Screen  Collected: 8/9/2023  2:08 PM (Final result)                  Medication Contract and Consent for Opioid Use Documents Filed       Patient Documents       Type of Document Status Date Received Received By Description    Medication Contract Received 12/10/2024  2:17 PM VEDA SHORE 12-10-24 Medication agreement

## 2025-01-09 DIAGNOSIS — R51.9 ACUTE NONINTRACTABLE HEADACHE, UNSPECIFIED HEADACHE TYPE: ICD-10-CM

## 2025-01-09 DIAGNOSIS — F41.9 ANXIETY: ICD-10-CM

## 2025-01-09 DIAGNOSIS — F90.9 ATTENTION DEFICIT HYPERACTIVITY DISORDER (ADHD), UNSPECIFIED ADHD TYPE: ICD-10-CM

## 2025-01-09 RX ORDER — CLONAZEPAM 1 MG/1
TABLET ORAL
Qty: 45 TABLET | Refills: 0 | OUTPATIENT
Start: 2025-01-09 | End: 2025-02-04

## 2025-01-09 RX ORDER — BUTALBITAL, ACETAMINOPHEN AND CAFFEINE 50; 325; 40 MG/1; MG/1; MG/1
1 TABLET ORAL EVERY 6 HOURS PRN
Qty: 30 TABLET | Refills: 0 | OUTPATIENT
Start: 2025-01-09

## 2025-01-09 RX ORDER — DEXTROAMPHETAMINE SACCHARATE, AMPHETAMINE ASPARTATE MONOHYDRATE, DEXTROAMPHETAMINE SULFATE AND AMPHETAMINE SULFATE 7.5; 7.5; 7.5; 7.5 MG/1; MG/1; MG/1; MG/1
30 CAPSULE, EXTENDED RELEASE ORAL DAILY
Qty: 30 CAPSULE | Refills: 0 | OUTPATIENT
Start: 2025-01-09 | End: 2025-02-08

## 2025-01-13 DIAGNOSIS — F41.9 ANXIETY: ICD-10-CM

## 2025-01-13 DIAGNOSIS — F90.9 ATTENTION DEFICIT HYPERACTIVITY DISORDER (ADHD), UNSPECIFIED ADHD TYPE: ICD-10-CM

## 2025-01-13 DIAGNOSIS — R51.9 ACUTE NONINTRACTABLE HEADACHE, UNSPECIFIED HEADACHE TYPE: ICD-10-CM

## 2025-01-13 RX ORDER — DEXTROAMPHETAMINE SACCHARATE, AMPHETAMINE ASPARTATE MONOHYDRATE, DEXTROAMPHETAMINE SULFATE AND AMPHETAMINE SULFATE 7.5; 7.5; 7.5; 7.5 MG/1; MG/1; MG/1; MG/1
30 CAPSULE, EXTENDED RELEASE ORAL DAILY
Qty: 30 CAPSULE | Refills: 0 | Status: SHIPPED | OUTPATIENT
Start: 2025-01-13 | End: 2025-02-12

## 2025-01-13 RX ORDER — CLONAZEPAM 1 MG/1
TABLET ORAL
Qty: 45 TABLET | Refills: 0 | Status: SHIPPED | OUTPATIENT
Start: 2025-01-13 | End: 2025-02-08

## 2025-01-13 RX ORDER — BUTALBITAL, ACETAMINOPHEN AND CAFFEINE 50; 325; 40 MG/1; MG/1; MG/1
1 TABLET ORAL EVERY 6 HOURS PRN
Qty: 30 TABLET | Refills: 0 | Status: SHIPPED | OUTPATIENT
Start: 2025-01-13

## 2025-01-17 NOTE — PROGRESS NOTES
Primary Physician: Marla Cortez MD    HISTORY OF PRESENT ILLNESS:  Ms. Dionne Guzman is 44 y.o. female with hx of unspecified bleeding disorder thought to be ITP, ADHD, migraines and chronic neck pain. She has mainly neck pain but also has pain in her bilateral upper extremities. She has numbness in the BL 2-4th digits. MRI of her neck from 2023 has shown a disc herniation/osteophyte at C5-6 with severe right sided foraminal stenosis. She has been thought to be high risk for epidural injections due to her clotting disorder.    We have been treating her chronic migraines with botox injections. She has also had TPI to her cervical paraspinal muscles.   Pain History:      Location:       Quality:       Severity: /10      Onset:       Duration:       Relieving Factors:      Exacerbating Factors:    Red Flags:      Progressive neurologic deficits:      Bowel and bladder function:      Unexplained Weight loss:      Cancer history:      Trauma:      Fevers / chills:    Current Pain/Psych/Sleep Medications:  - adderall  -atogepant  -fioricet -40 q 6 hours prn for migraines (PCP)  -clonazepam 1mg BID prn for anxiety (PCP)  -cyclobenzaprine 10mg TID prn  -topamax 200mg q BID  -ubrelvy 100mg q daily    Prior PharmacotherapyName, dose, response  Acetaminophen  NSAIDS/steroids  Muscle Relaxants  Anti-neuropathic (tiffanie, AEDs)   Antidepressants (TCA, SNRI, SSRI)  Opioids  Topicals (lido, diclofenac)  Supplements (ALA, turmeric, LDN)  *NT = not tried, NE = not effective, AE = adverse effects        Opioid Risk Assessment:      Taking opioids?       Providing Analgesia?      Improved Activity?      Aberrant behavior?      Adverse events?    Opioid Risk Tool (ORT):      Family Hx Substance Abuse (EtOH, Illicits, Rx drugs 1 each) -      Personal Hx Substance Abuse (EtOH, Illicits, Rx drugs 1 each) -       Age (16-45 years) (1) -      Psychological Disease (1 each) -        Scoring Total -              ( <3 = low risk;

## 2025-01-20 ENCOUNTER — TELEPHONE (OUTPATIENT)
Dept: PAIN MANAGEMENT | Age: 45
End: 2025-01-20

## 2025-01-20 ENCOUNTER — HOSPITAL ENCOUNTER (OUTPATIENT)
Dept: PAIN MANAGEMENT | Age: 45
Discharge: HOME | End: 2025-01-20

## 2025-01-20 ENCOUNTER — OFFICE VISIT (OUTPATIENT)
Age: 45
End: 2025-01-20
Payer: COMMERCIAL

## 2025-01-20 VITALS
HEIGHT: 66 IN | WEIGHT: 127 LBS | OXYGEN SATURATION: 98 % | BODY MASS INDEX: 20.41 KG/M2 | DIASTOLIC BLOOD PRESSURE: 72 MMHG | HEART RATE: 102 BPM | RESPIRATION RATE: 18 BRPM | SYSTOLIC BLOOD PRESSURE: 110 MMHG | TEMPERATURE: 98.1 F

## 2025-01-20 DIAGNOSIS — Z11.59 SCREENING FOR VIRAL DISEASE: ICD-10-CM

## 2025-01-20 DIAGNOSIS — R09.81 NASAL CONGESTION: ICD-10-CM

## 2025-01-20 DIAGNOSIS — R11.0 NAUSEA: ICD-10-CM

## 2025-01-20 DIAGNOSIS — J02.9 SORE THROAT: ICD-10-CM

## 2025-01-20 DIAGNOSIS — R05.1 ACUTE COUGH: ICD-10-CM

## 2025-01-20 DIAGNOSIS — U07.1 COVID: Primary | ICD-10-CM

## 2025-01-20 LAB
INFLUENZA A ANTIBODY: NORMAL
INFLUENZA B ANTIBODY: NORMAL
Lab: ABNORMAL
QC PASS/FAIL: ABNORMAL
S PYO AG THROAT QL: NORMAL
SARS-COV-2, POC: DETECTED

## 2025-01-20 PROCEDURE — 99213 OFFICE O/P EST LOW 20 MIN: CPT

## 2025-01-20 PROCEDURE — 87811 SARS-COV-2 COVID19 W/OPTIC: CPT

## 2025-01-20 PROCEDURE — 87804 INFLUENZA ASSAY W/OPTIC: CPT

## 2025-01-20 PROCEDURE — 87880 STREP A ASSAY W/OPTIC: CPT

## 2025-01-20 RX ORDER — DEXTROMETHORPHAN HYDROBROMIDE AND PROMETHAZINE HYDROCHLORIDE 15; 6.25 MG/5ML; MG/5ML
5 SYRUP ORAL NIGHTLY
Qty: 35 ML | Refills: 0 | Status: SHIPPED | OUTPATIENT
Start: 2025-01-20 | End: 2025-01-27

## 2025-01-20 RX ORDER — ONDANSETRON 4 MG/1
4 TABLET, ORALLY DISINTEGRATING ORAL 3 TIMES DAILY PRN
Qty: 21 TABLET | Refills: 0 | Status: SHIPPED | OUTPATIENT
Start: 2025-01-20

## 2025-01-20 RX ORDER — LORATADINE PSEUDOEPHEDRINE SULFATE 10; 240 MG/1; MG/1
1 TABLET, EXTENDED RELEASE ORAL DAILY
Qty: 10 TABLET | Refills: 0 | Status: SHIPPED | OUTPATIENT
Start: 2025-01-20 | End: 2025-01-30

## 2025-01-20 ASSESSMENT — ENCOUNTER SYMPTOMS
EYE ITCHING: 0
TROUBLE SWALLOWING: 0
CHEST TIGHTNESS: 0
COLOR CHANGE: 0
CONSTIPATION: 0
ABDOMINAL PAIN: 0
COUGH: 1
NAUSEA: 1
APNEA: 0
ABDOMINAL DISTENTION: 0
SINUS PRESSURE: 0
DIARRHEA: 0
RHINORRHEA: 0
VOMITING: 0
SHORTNESS OF BREATH: 0
SORE THROAT: 0
WHEEZING: 0
SINUS PAIN: 0
EYE DISCHARGE: 0
EYE PAIN: 0

## 2025-01-20 NOTE — PROGRESS NOTES
[facility]  [facility]  [address] [place] [address] DRIVE  [facility] [address]  Dept: [phone]  Dept Fax: [phone]  Loc: [phone]    Dionne Guzman is a 44 y.o. female who presents today for her medical conditions/complaints as noted below.  Dionne Guzman is c/o of Congestion, Cough, Generalized Body Aches, and Fever (X1 week )        HPI:     Dionne Guzman presents with complaints of congestion, cough, body aches, and fever. Patient states her symptoms initially started about a week ago, but they have gotten significantly worse in the past 3 days. She states her fever has been running about 102 F. She has been taking dayquil, nyquil, tylenol/ibuprofen, and other OTC sinus medications and has had no relief. She denies any shortness of breath or wheezing. No known sick contacts. She is ill-appearing, but she is stable.    Denies any recent antibiotic or steroid administration.      Past Medical History:   Diagnosis Date    ADHD (attention deficit hyperactivity disorder)     Clotting disorder (HCC)     factor 14, 11    GERD (gastroesophageal reflux disease)     Headache      Past Surgical History:   Procedure Laterality Date    BONE MARROW BIOPSY N/A 01/10/2024    BONE MARROW ASPIRATION BIOPSY performed by Odalys Winston APRN at [facility] OR    BREAST ENHANCEMENT SURGERY      CHOLECYSTECTOMY      COLONOSCOPY  2005    \"NORMAL\" PER PT RECALL    COLONOSCOPY  2019    [place]- \" normal\" per pt recall    COLONOSCOPY N/A 03/04/2024    Dr GUSTAVO [facility]-8 yr (age 52) recall    DE COLONOSCOPY FLX DX W/COLLJ SPEC WHEN PFRMD N/A 07/23/2018    Dr Montes[place], 14 yr (age 52) recall    DE EGD TRANSORAL BIOPSY SINGLE/MULTIPLE N/A 07/23/2018    Dr Montes (-) Cathy, (+) Barrettt's, (-) dysplasia--1st dx--1 yr recall    TONSILLECTOMY AND ADENOIDECTOMY      UPPER GASTROINTESTINAL ENDOSCOPY  2005    \"NORMAL\" PER PT RECALL    UPPER GASTROINTESTINAL ENDOSCOPY  2019    [place]- \"normal\" per pt

## 2025-01-20 NOTE — PATIENT INSTRUCTIONS
- COVID positive.  - Negative Strep and Flu test.  - Claritin D sent to pharmacy; take as prescribed.  - Offered to send in Flonase, but patient states she has some at home.   - Cough medicine sent to pharmacy; take as prescribed.  - Medications such as Zyrtec or Claritin may help with symptoms.   - Nasal decongestant, such as Flonase/Afrin as needed.  - OTC cough medicine like Delsym/Robitussin if applicable.  - Tylenol/Motrin as needed for body aches/fevers unless contraindicated.  - Multivitamin is recommended to help boost the immune system.  - Drink plenty of water to stay hydrated.  - May use humidifier as needed while sleeping.  - Allow adequate rest.  - Encourage deep breathing exercises.  - Recommended staying home until fever free for at least 24 hours without medication and symptoms are improving.  - Work note provided; return on Wednesday.  - Return to the clinic or follow up with PCP if symptoms worsen or fail to improve.

## 2025-01-23 ENCOUNTER — PATIENT MESSAGE (OUTPATIENT)
Dept: PRIMARY CARE CLINIC | Age: 45
End: 2025-01-23

## 2025-01-23 ENCOUNTER — OFFICE VISIT (OUTPATIENT)
Dept: PRIMARY CARE CLINIC | Age: 45
End: 2025-01-23

## 2025-01-23 VITALS
DIASTOLIC BLOOD PRESSURE: 84 MMHG | RESPIRATION RATE: 16 BRPM | TEMPERATURE: 98 F | WEIGHT: 125 LBS | BODY MASS INDEX: 20.09 KG/M2 | HEART RATE: 114 BPM | HEIGHT: 66 IN | SYSTOLIC BLOOD PRESSURE: 118 MMHG | OXYGEN SATURATION: 100 %

## 2025-01-23 DIAGNOSIS — U07.1 COVID-19: Primary | ICD-10-CM

## 2025-01-23 RX ORDER — BETAMETHASONE SODIUM PHOSPHATE AND BETAMETHASONE ACETATE 3; 3 MG/ML; MG/ML
12 INJECTION, SUSPENSION INTRA-ARTICULAR; INTRALESIONAL; INTRAMUSCULAR; SOFT TISSUE ONCE
Status: COMPLETED | OUTPATIENT
Start: 2025-01-23 | End: 2025-01-23

## 2025-01-23 RX ORDER — GUAIFENESIN 600 MG/1
600 TABLET, EXTENDED RELEASE ORAL 2 TIMES DAILY
Qty: 30 TABLET | Refills: 0 | Status: SHIPPED | OUTPATIENT
Start: 2025-01-23 | End: 2025-02-07

## 2025-01-23 RX ORDER — PSEUDOEPHEDRINE HCL 120 MG/1
120 TABLET, FILM COATED, EXTENDED RELEASE ORAL EVERY 12 HOURS
Qty: 14 TABLET | Refills: 0 | Status: SHIPPED | OUTPATIENT
Start: 2025-01-23 | End: 2025-01-30

## 2025-01-23 RX ORDER — DEXTROMETHORPHAN HYDROBROMIDE AND PROMETHAZINE HYDROCHLORIDE 15; 6.25 MG/5ML; MG/5ML
5 SYRUP ORAL 4 TIMES DAILY PRN
Qty: 240 ML | Refills: 0 | Status: SHIPPED | OUTPATIENT
Start: 2025-01-23 | End: 2025-01-30

## 2025-01-23 RX ADMIN — BETAMETHASONE SODIUM PHOSPHATE AND BETAMETHASONE ACETATE 12 MG: 3; 3 INJECTION, SUSPENSION INTRA-ARTICULAR; INTRALESIONAL; INTRAMUSCULAR; SOFT TISSUE at 14:50

## 2025-01-23 SDOH — ECONOMIC STABILITY: INCOME INSECURITY: IN THE LAST 12 MONTHS, WAS THERE A TIME WHEN YOU WERE NOT ABLE TO PAY THE MORTGAGE OR RENT ON TIME?: NO

## 2025-01-23 SDOH — ECONOMIC STABILITY: FOOD INSECURITY: WITHIN THE PAST 12 MONTHS, THE FOOD YOU BOUGHT JUST DIDN'T LAST AND YOU DIDN'T HAVE MONEY TO GET MORE.: NEVER TRUE

## 2025-01-23 SDOH — ECONOMIC STABILITY: FOOD INSECURITY: WITHIN THE PAST 12 MONTHS, YOU WORRIED THAT YOUR FOOD WOULD RUN OUT BEFORE YOU GOT MONEY TO BUY MORE.: NEVER TRUE

## 2025-01-23 SDOH — ECONOMIC STABILITY: TRANSPORTATION INSECURITY
IN THE PAST 12 MONTHS, HAS THE LACK OF TRANSPORTATION KEPT YOU FROM MEDICAL APPOINTMENTS OR FROM GETTING MEDICATIONS?: NO

## 2025-01-23 ASSESSMENT — ENCOUNTER SYMPTOMS
EYE ITCHING: 0
RHINORRHEA: 1
DIARRHEA: 0
SINUS PRESSURE: 0
COUGH: 1
EYE REDNESS: 0
EYE DISCHARGE: 0
CHEST TIGHTNESS: 0
WHEEZING: 0
VOMITING: 0
COLOR CHANGE: 0
NAUSEA: 0
ABDOMINAL PAIN: 0

## 2025-01-23 ASSESSMENT — PATIENT HEALTH QUESTIONNAIRE - PHQ9
SUM OF ALL RESPONSES TO PHQ9 QUESTIONS 1 & 2: 2
1. LITTLE INTEREST OR PLEASURE IN DOING THINGS: SEVERAL DAYS
1. LITTLE INTEREST OR PLEASURE IN DOING THINGS: SEVERAL DAYS
SUM OF ALL RESPONSES TO PHQ QUESTIONS 1-9: 2
SUM OF ALL RESPONSES TO PHQ QUESTIONS 1-9: 2
2. FEELING DOWN, DEPRESSED OR HOPELESS: SEVERAL DAYS
SUM OF ALL RESPONSES TO PHQ QUESTIONS 1-9: 2
2. FEELING DOWN, DEPRESSED OR HOPELESS: SEVERAL DAYS
SUM OF ALL RESPONSES TO PHQ9 QUESTIONS 1 & 2: 2
SUM OF ALL RESPONSES TO PHQ QUESTIONS 1-9: 2

## 2025-01-23 NOTE — PROGRESS NOTES
SUBJECTIVE:    Patient ID: Dionne Guzman is a 44 y.o. female.    HPI:   Patient is seen today for complaints of cough and congestion.  This been going on since January 15.  She went to urgent care on the 20th and was positive for COVID at that time.  She states that she feels that symptoms may be worsening slightly.  She states that she is not really getting any better.  She is still coughing but is not really able to cough anything up it does feel like her chest is tight and wheezing some.  She states that they prescribed her Promethazine DM and Claritin-D.  She really does not feel these are doing much for her.  She states that she still feels tired and rundown.  The cough is worse at night.  She states the Promethazine DM is not really helping much    Past Medical History:   Diagnosis Date    ADHD (attention deficit hyperactivity disorder)     Clotting disorder (HCC)     factor 14, 11    GERD (gastroesophageal reflux disease)     Headache       Current Outpatient Medications on File Prior to Visit   Medication Sig Dispense Refill    loratadine-pseudoephedrine (CLARITIN-D 24 HOUR)  MG per extended release tablet Take 1 tablet by mouth daily for 10 days 10 tablet 0    promethazine-dextromethorphan (PROMETHAZINE-DM) 6.25-15 MG/5ML syrup Take 5 mLs by mouth nightly for 7 days 35 mL 0    ondansetron (ZOFRAN-ODT) 4 MG disintegrating tablet Take 1 tablet by mouth 3 times daily as needed for Nausea or Vomiting 21 tablet 0    amphetamine-dextroamphetamine (ADDERALL XR) 30 MG extended release capsule Take 1 capsule by mouth daily for 30 days. 30 capsule 0    clonazePAM (KLONOPIN) 1 MG tablet TAKE 1 TABLET BY MOUTH TWICE DAILY AS NEEDED FOR ANXIETY. MAX DAILY AMOUNT 2 MG 45 tablet 0    butalbital-acetaminophen-caffeine (FIORICET, ESGIC) -40 MG per tablet Take 1 tablet by mouth every 6 hours as needed for Headaches 30 tablet 0    norethindrone-ethinyl estradiol-iron (DEBBIE WILKINS 1.5/30) 1.5-30 MG-MCG tablet TAKE 1

## 2025-01-24 RX ORDER — ALBUTEROL SULFATE 90 UG/1
2 INHALANT RESPIRATORY (INHALATION) 4 TIMES DAILY PRN
Qty: 18 G | Refills: 0 | Status: SHIPPED | OUTPATIENT
Start: 2025-01-24

## 2025-01-27 ENCOUNTER — TELEPHONE (OUTPATIENT)
Dept: HEMATOLOGY | Age: 45
End: 2025-01-27

## 2025-01-27 NOTE — TELEPHONE ENCOUNTER
I called patient and left detailed voicemail about their appointment on 01/30/2025. I made patient aware not to arrive any earlier than the appointment time and to come at the time of the follow up not the time of the lab appointment if it is different than the follow up appt time. I also made patient aware to eat and drink plenty of water to hydrate properly before coming to these appointments because this will make their lab draw much easier. Made patient aware that we are now located at the [facility] at [address] [place] [address]. Located between [facility] and the [facility].

## 2025-01-28 RX ORDER — METHYLPREDNISOLONE 4 MG/1
TABLET ORAL
Qty: 1 KIT | Refills: 0 | Status: SHIPPED | OUTPATIENT
Start: 2025-01-28 | End: 2025-02-03

## 2025-01-29 DIAGNOSIS — E53.8 B12 DEFICIENCY: ICD-10-CM

## 2025-01-29 DIAGNOSIS — D69.3 IDIOPATHIC THROMBOCYTOPENIC PURPURA (HCC): Primary | ICD-10-CM

## 2025-01-29 DIAGNOSIS — R88.8 VERY LOW IRON STORES IN BONE MARROW: ICD-10-CM

## 2025-01-29 NOTE — PROGRESS NOTES
NEUTROABS 1.84 05/12/2025     Lab Results   Component Value Date     05/12/2025    K 4.4 05/12/2025     (H) 05/12/2025    CO2 20 (L) 05/12/2025    BUN 12 05/12/2025    CREATININE 0.9 05/12/2025    GLUCOSE 84 05/12/2025    CALCIUM 8.4 (L) 05/12/2025    BILITOT <0.2 05/12/2025    ALKPHOS 34 (L) 05/12/2025    AST 14 05/12/2025    ALT 11 05/12/2025    LABGLOM 82 05/12/2025    GFRAA >59 09/10/2020    GLOB 2.5 01/26/2024       ASSESSMENT/PLAN:      1.  Primary idiopathic thrombocytopenic purpura (ITP), The diagnosis of ITP is made from exclusion of multiple diagnoses to include autoimmune disorder, lymphoma, splenomegaly, myelodysplastic syndrome, viral infections, plasma cell myeloma, alcohol or medication induced, and metastatic carcinoma; confirmed on bone marrow aspiration biopsy on 1/10/2024.      CBC today reports a stable platelet count of 142,000 with a normal hemoglobin of 14.0 and hematocrit of 43.5 today, 5/12/2025    - Contact the clinic with any bleeding  -Conservative monitoring of platelet count  - Previously a referral was made to [facility] for further evaluation and recommendations for menstrual cycle-cancelled or no showed x3    2.  Intermittent leukopenia with neutropenia: Currently resolved with a WBC of 3.51, lymphocytes 35.9% and an ANC of 1.84 today.  Denies any febrile illness or infectious process    -Contact the clinic with any febrile illness and CBC can be obtained    3. Bleeding disorder:  (work-up has been negative for von Willebrand disease on 9/7/2019 and on 4/6/2023)    Denied any bleeding to include melena, epistaxis, hemoptysis, hematuria or hematochezia.        - If Dionne experiences any bleeding diatheses will make a referral to the Hemophilia clinic to see ALVERTO Dietrich for further evaluation and recommendations.    4.  Bone marrow aspiration biopsy on 1/10/2024 reported no stainable storage iron and reports history of iron deficiency with intolerance to oral iron and

## 2025-02-03 RX ORDER — NORETHINDRONE ACETATE AND ETHINYL ESTRADIOL 1.5-30(21)
1 KIT ORAL DAILY
Qty: 3 PACKET | Refills: 3 | Status: SHIPPED | OUTPATIENT
Start: 2025-02-03

## 2025-02-12 DIAGNOSIS — F90.9 ATTENTION DEFICIT HYPERACTIVITY DISORDER (ADHD), UNSPECIFIED ADHD TYPE: ICD-10-CM

## 2025-02-12 DIAGNOSIS — R51.9 ACUTE NONINTRACTABLE HEADACHE, UNSPECIFIED HEADACHE TYPE: ICD-10-CM

## 2025-02-12 DIAGNOSIS — F41.9 ANXIETY: ICD-10-CM

## 2025-02-12 RX ORDER — BUTALBITAL, ACETAMINOPHEN AND CAFFEINE 50; 325; 40 MG/1; MG/1; MG/1
1 TABLET ORAL EVERY 6 HOURS PRN
Qty: 30 TABLET | Refills: 0 | Status: SHIPPED | OUTPATIENT
Start: 2025-02-12

## 2025-02-12 RX ORDER — DEXTROAMPHETAMINE SACCHARATE, AMPHETAMINE ASPARTATE MONOHYDRATE, DEXTROAMPHETAMINE SULFATE AND AMPHETAMINE SULFATE 7.5; 7.5; 7.5; 7.5 MG/1; MG/1; MG/1; MG/1
30 CAPSULE, EXTENDED RELEASE ORAL DAILY
Qty: 30 CAPSULE | Refills: 0 | Status: SHIPPED | OUTPATIENT
Start: 2025-02-12 | End: 2025-03-14

## 2025-02-12 RX ORDER — TOPIRAMATE 200 MG/1
200 TABLET, FILM COATED ORAL 2 TIMES DAILY
Qty: 180 TABLET | Refills: 0 | Status: SHIPPED | OUTPATIENT
Start: 2025-02-12

## 2025-02-12 RX ORDER — CLONAZEPAM 1 MG/1
TABLET ORAL
Qty: 45 TABLET | Refills: 0 | Status: SHIPPED | OUTPATIENT
Start: 2025-02-12 | End: 2025-03-10

## 2025-02-12 NOTE — TELEPHONE ENCOUNTER
PDMP Monitoring:    Last PDMP Greg as Reviewed ([place]):  Review User Review Instant Review Result   SUKHWINDER SLADE 12/10/2024  2:25 PM Reviewed PDMP [1]       Last office visit for requested medication : 9/18/2024    Next office visit : 3/18/2025     Last UDS:   Benzodiazepine Screen, Urine   Date Value Ref Range Status   12/10/2024 neg  Final     Buprenorphine Urine   Date Value Ref Range Status   12/10/2024 neg  Final     Cocaine Metabolite Screen, Urine   Date Value Ref Range Status   12/10/2024 neg  Final     Gabapentin Screen, Urine   Date Value Ref Range Status   08/09/2023 n  Final     Oxycodone Screen, Ur   Date Value Ref Range Status   12/10/2024 neg  Final     Propoxyphene Screen, Urine   Date Value Ref Range Status   08/09/2023 n  Final     THC Screen, Urine   Date Value Ref Range Status   12/10/2024 neg  Final     Tricyclic Antidepressants, Urine   Date Value Ref Range Status   08/09/2023 n  Final       Medication Contract and Consent for Opioid Use Documents Filed       Patient Documents       Type of Document Status Date Received Received By Description    Medication Contract Received 12/10/2024  2:17 PM VEDA SHORE 12-10-24 Medication agreement                    Requested Prescriptions     Pending Prescriptions Disp Refills    topiramate (TOPAMAX) 200 MG tablet 180 tablet 0     Sig: Take 1 tablet by mouth 2 times daily    clonazePAM (KLONOPIN) 1 MG tablet 45 tablet 0     Sig: TAKE 1 TABLET BY MOUTH TWICE DAILY AS NEEDED FOR ANXIETY. MAX DAILY AMOUNT 2 MG    butalbital-acetaminophen-caffeine (FIORICET, ESGIC) -40 MG per tablet 30 tablet 0     Sig: Take 1 tablet by mouth every 6 hours as needed for Headaches         Please approve or refuse this medication.   Nafisa Flores MA

## 2025-02-12 NOTE — TELEPHONE ENCOUNTER
PDMP Monitoring:    Last PDMP Greg as Reviewed ([place]):  Review User Review Instant Review Result   SUKHWINDER SLADE 12/10/2024  2:25 PM Reviewed PDMP [1]       Last office visit for requested medication : 3/26/2024    Next office visit : 3/18/2025     Last UDS:   Benzodiazepine Screen, Urine   Date Value Ref Range Status   12/10/2024 neg  Final     Buprenorphine Urine   Date Value Ref Range Status   12/10/2024 neg  Final     Cocaine Metabolite Screen, Urine   Date Value Ref Range Status   12/10/2024 neg  Final     Gabapentin Screen, Urine   Date Value Ref Range Status   08/09/2023 n  Final     Oxycodone Screen, Ur   Date Value Ref Range Status   12/10/2024 neg  Final     Propoxyphene Screen, Urine   Date Value Ref Range Status   08/09/2023 n  Final     THC Screen, Urine   Date Value Ref Range Status   12/10/2024 neg  Final     Tricyclic Antidepressants, Urine   Date Value Ref Range Status   08/09/2023 n  Final       Medication Contract and Consent for Opioid Use Documents Filed       Patient Documents       Type of Document Status Date Received Received By Description    Medication Contract Received 12/10/2024  2:17 PM VEDA SHORE 12-10-24 Medication agreement                    Requested Prescriptions     Pending Prescriptions Disp Refills    amphetamine-dextroamphetamine (ADDERALL XR) 30 MG extended release capsule 30 capsule 0     Sig: Take 1 capsule by mouth daily for 30 days.         Please approve or refuse this medication.   Nafisa Flores MA   1st attempt left message regarding smoking cessation quit 4 episode.

## 2025-03-12 DIAGNOSIS — R51.9 ACUTE NONINTRACTABLE HEADACHE, UNSPECIFIED HEADACHE TYPE: ICD-10-CM

## 2025-03-12 DIAGNOSIS — G43.109 MIGRAINE WITH AURA AND WITHOUT STATUS MIGRAINOSUS, NOT INTRACTABLE: ICD-10-CM

## 2025-03-12 DIAGNOSIS — F41.9 ANXIETY: ICD-10-CM

## 2025-03-12 DIAGNOSIS — F90.9 ATTENTION DEFICIT HYPERACTIVITY DISORDER (ADHD), UNSPECIFIED ADHD TYPE: ICD-10-CM

## 2025-03-12 RX ORDER — ATOGEPANT 60 MG/1
1 TABLET ORAL DAILY
Qty: 30 TABLET | Refills: 2 | Status: SHIPPED | OUTPATIENT
Start: 2025-03-12

## 2025-03-12 RX ORDER — DEXTROAMPHETAMINE SACCHARATE, AMPHETAMINE ASPARTATE MONOHYDRATE, DEXTROAMPHETAMINE SULFATE AND AMPHETAMINE SULFATE 7.5; 7.5; 7.5; 7.5 MG/1; MG/1; MG/1; MG/1
30 CAPSULE, EXTENDED RELEASE ORAL DAILY
Qty: 30 CAPSULE | Refills: 0 | Status: SHIPPED | OUTPATIENT
Start: 2025-03-12 | End: 2025-04-11

## 2025-03-12 RX ORDER — BUTALBITAL, ACETAMINOPHEN AND CAFFEINE 50; 325; 40 MG/1; MG/1; MG/1
1 TABLET ORAL EVERY 6 HOURS PRN
Qty: 30 TABLET | Refills: 0 | Status: SHIPPED | OUTPATIENT
Start: 2025-03-12

## 2025-03-12 RX ORDER — CLONAZEPAM 1 MG/1
TABLET ORAL
Qty: 45 TABLET | Refills: 0 | Status: SHIPPED | OUTPATIENT
Start: 2025-03-12 | End: 2025-04-07

## 2025-03-12 RX ORDER — ONDANSETRON 8 MG/1
8 TABLET, FILM COATED ORAL EVERY 8 HOURS PRN
Qty: 20 TABLET | Refills: 1 | Status: SHIPPED | OUTPATIENT
Start: 2025-03-12

## 2025-04-11 DIAGNOSIS — F90.9 ATTENTION DEFICIT HYPERACTIVITY DISORDER (ADHD), UNSPECIFIED ADHD TYPE: ICD-10-CM

## 2025-04-11 DIAGNOSIS — R51.9 ACUTE NONINTRACTABLE HEADACHE, UNSPECIFIED HEADACHE TYPE: ICD-10-CM

## 2025-04-11 DIAGNOSIS — F41.9 ANXIETY: ICD-10-CM

## 2025-04-11 DIAGNOSIS — G43.109 MIGRAINE WITH AURA AND WITHOUT STATUS MIGRAINOSUS, NOT INTRACTABLE: ICD-10-CM

## 2025-04-11 RX ORDER — BUTALBITAL, ACETAMINOPHEN AND CAFFEINE 50; 325; 40 MG/1; MG/1; MG/1
1 TABLET ORAL EVERY 6 HOURS PRN
Qty: 30 TABLET | Refills: 0 | OUTPATIENT
Start: 2025-04-11

## 2025-04-11 RX ORDER — ATOGEPANT 60 MG/1
1 TABLET ORAL DAILY
Qty: 30 TABLET | Refills: 2 | Status: SHIPPED | OUTPATIENT
Start: 2025-04-11

## 2025-04-11 RX ORDER — ONDANSETRON 8 MG/1
8 TABLET, FILM COATED ORAL EVERY 8 HOURS PRN
Qty: 20 TABLET | Refills: 1 | Status: SHIPPED | OUTPATIENT
Start: 2025-04-11

## 2025-04-11 RX ORDER — CLONAZEPAM 1 MG/1
TABLET ORAL
Qty: 45 TABLET | Refills: 0 | OUTPATIENT
Start: 2025-04-11 | End: 2025-05-07

## 2025-04-11 RX ORDER — DEXTROAMPHETAMINE SACCHARATE, AMPHETAMINE ASPARTATE MONOHYDRATE, DEXTROAMPHETAMINE SULFATE AND AMPHETAMINE SULFATE 7.5; 7.5; 7.5; 7.5 MG/1; MG/1; MG/1; MG/1
30 CAPSULE, EXTENDED RELEASE ORAL DAILY
Qty: 30 CAPSULE | Refills: 0 | OUTPATIENT
Start: 2025-04-11 | End: 2025-05-11

## 2025-04-11 RX ORDER — NORETHINDRONE ACETATE AND ETHINYL ESTRADIOL 1.5-30(21)
1 KIT ORAL DAILY
Qty: 3 PACKET | Refills: 3 | Status: SHIPPED | OUTPATIENT
Start: 2025-04-11

## 2025-04-11 NOTE — TELEPHONE ENCOUNTER
Provider needs to review PDMP    PDMP Monitoring:    Last PDMP Greg as Reviewed ([place]):  Review User Review Instant Review Result   SUKHWINDER SLADE 12/10/2024  2:25 PM Reviewed PDMP [1]     Urine Drug Screenings (1 yr)       POCT Rapid Drug Screen  Collected: 12/10/2024  2:16 PM (Final result)              POCT Rapid Drug Screen  Collected: 8/9/2023  2:08 PM (Final result)                  Medication Contract and Consent for Opioid Use Documents Filed       Patient Documents       Type of Document Status Date Received Received By Description    Medication Contract Received 12/10/2024  2:17 PM VEDA SHORE 12-10-24 Medication agreement                    
90

## 2025-04-14 ENCOUNTER — OFFICE VISIT (OUTPATIENT)
Dept: PRIMARY CARE CLINIC | Age: 45
End: 2025-04-14
Payer: COMMERCIAL

## 2025-04-14 VITALS
BODY MASS INDEX: 19.44 KG/M2 | WEIGHT: 121 LBS | HEART RATE: 89 BPM | DIASTOLIC BLOOD PRESSURE: 84 MMHG | TEMPERATURE: 97.4 F | OXYGEN SATURATION: 99 % | SYSTOLIC BLOOD PRESSURE: 120 MMHG | HEIGHT: 66 IN | RESPIRATION RATE: 16 BRPM

## 2025-04-14 DIAGNOSIS — R51.9 ACUTE NONINTRACTABLE HEADACHE, UNSPECIFIED HEADACHE TYPE: ICD-10-CM

## 2025-04-14 DIAGNOSIS — Z79.899 MEDICATION MANAGEMENT: ICD-10-CM

## 2025-04-14 DIAGNOSIS — F90.9 ATTENTION DEFICIT HYPERACTIVITY DISORDER (ADHD), UNSPECIFIED ADHD TYPE: ICD-10-CM

## 2025-04-14 DIAGNOSIS — G43.109 MIGRAINE WITH AURA AND WITHOUT STATUS MIGRAINOSUS, NOT INTRACTABLE: Primary | ICD-10-CM

## 2025-04-14 DIAGNOSIS — F41.9 ANXIETY: ICD-10-CM

## 2025-04-14 PROCEDURE — 99214 OFFICE O/P EST MOD 30 MIN: CPT | Performed by: FAMILY MEDICINE

## 2025-04-14 RX ORDER — DEXTROAMPHETAMINE SACCHARATE, AMPHETAMINE ASPARTATE MONOHYDRATE, DEXTROAMPHETAMINE SULFATE AND AMPHETAMINE SULFATE 7.5; 7.5; 7.5; 7.5 MG/1; MG/1; MG/1; MG/1
30 CAPSULE, EXTENDED RELEASE ORAL DAILY
Qty: 30 CAPSULE | Refills: 0 | Status: SHIPPED | OUTPATIENT
Start: 2025-04-14 | End: 2025-05-14

## 2025-04-14 RX ORDER — FREMANEZUMAB-VFRM 225 MG/1.5ML
INJECTION SUBCUTANEOUS
Qty: 1.5 ML | Refills: 3 | Status: SHIPPED | OUTPATIENT
Start: 2025-04-14

## 2025-04-14 RX ORDER — CLONAZEPAM 1 MG/1
TABLET ORAL
Qty: 45 TABLET | Refills: 0 | Status: SHIPPED | OUTPATIENT
Start: 2025-04-14 | End: 2025-05-10

## 2025-04-14 RX ORDER — BUTALBITAL, ACETAMINOPHEN AND CAFFEINE 50; 325; 40 MG/1; MG/1; MG/1
1 TABLET ORAL EVERY 6 HOURS PRN
Qty: 30 TABLET | Refills: 0 | Status: SHIPPED | OUTPATIENT
Start: 2025-04-14

## 2025-04-14 RX ORDER — NORETHINDRONE ACETATE AND ETHINYL ESTRADIOL 1.5-30(21)
1 KIT ORAL DAILY
Qty: 3 PACKET | Refills: 3 | Status: SHIPPED | OUTPATIENT
Start: 2025-04-14

## 2025-04-14 RX ORDER — UBROGEPANT 100 MG/1
TABLET ORAL
Qty: 16 TABLET | Refills: 3 | Status: SHIPPED | OUTPATIENT
Start: 2025-04-14

## 2025-04-14 ASSESSMENT — ENCOUNTER SYMPTOMS
DIARRHEA: 0
VOMITING: 0
EYE DISCHARGE: 0
NAUSEA: 0
COUGH: 0
BACK PAIN: 0
WHEEZING: 0
ABDOMINAL PAIN: 0
COLOR CHANGE: 0

## 2025-04-14 NOTE — PROGRESS NOTES
SUBJECTIVE:    Patient ID: Dionne Guzman is a 44 y.o. female.    History of Present Illness  The patient presents for a medication refill and med management.    Refills are requested for Adderall, Klonopin, Fioricet, birth control, Ajovy, and Ubrelvy. All medications are reported as effective.    Severe headaches are reported. An upcoming appointment with a neurologist in [place] is scheduled for 04/22/2025. Previous consultations at [place] did not yield significant findings. Botox treatment has been attempted for headaches without relief.      Past Medical History:   Diagnosis Date    ADHD (attention deficit hyperactivity disorder)     Anxiety     Clotting disorder     factor 14, 11    GERD (gastroesophageal reflux disease)     Headache     Irritable bowel syndrome       Current Outpatient Medications on File Prior to Visit   Medication Sig Dispense Refill    ondansetron (ZOFRAN) 8 MG tablet Take 1 tablet by mouth every 8 hours as needed for Nausea or Vomiting 20 tablet 1    Atogepant (QULIPTA) 60 MG TABS Take 1 tablet by mouth daily 30 tablet 2    topiramate (TOPAMAX) 200 MG tablet Take 1 tablet by mouth 2 times daily 180 tablet 0    albuterol sulfate HFA (VENTOLIN HFA) 108 (90 Base) MCG/ACT inhaler Inhale 2 puffs into the lungs 4 times daily as needed for Wheezing 18 g 0    ondansetron (ZOFRAN-ODT) 4 MG disintegrating tablet Take 1 tablet by mouth 3 times daily as needed for Nausea or Vomiting 21 tablet 0    ondansetron (ZOFRAN) 4 MG tablet Take 1 tablet by mouth 3 times daily as needed for Nausea or Vomiting 15 tablet 0     Current Facility-Administered Medications on File Prior to Visit   Medication Dose Route Frequency Provider Last Rate Last Admin    sodium chloride (PF) 0.9 % injection 4.5 mL  4.5 mL Other Once Karime Adhikari, APRN - MANDY        onabotulinumtoxinA (BOTOX) injection 155 Units  155 Units IntraMUSCular Once Karime Adhikari APRN - CNP         Allergies   Allergen Reactions    Iodides Itching

## 2025-04-25 RX ORDER — TOPIRAMATE 200 MG/1
200 TABLET, FILM COATED ORAL 2 TIMES DAILY
Qty: 180 TABLET | Refills: 0 | OUTPATIENT
Start: 2025-04-25

## 2025-04-28 RX ORDER — TOPIRAMATE 200 MG/1
200 TABLET, FILM COATED ORAL 2 TIMES DAILY
Qty: 180 TABLET | Refills: 0 | Status: SHIPPED | OUTPATIENT
Start: 2025-04-28

## 2025-05-06 ENCOUNTER — TELEPHONE (OUTPATIENT)
Dept: HEMATOLOGY | Age: 45
End: 2025-05-06

## 2025-05-06 NOTE — TELEPHONE ENCOUNTER
Called pt. to remind them of appointment on 05/12/2025 and had to leave a detailed voicemail with appointment date and time. Reminded patient to just come at appointment time, and to not come at the lab appointment time. Reminded patient that we will not check them in any more than 30 minutes before appointment time. We have now moved to the [facility] that is located between our old office and the ER at the [facility]. Letting the Pt know that our front entrance faces the [place] and leaving our address. Reminded pt to eat well and be well hydrated for their labs.

## 2025-05-12 ENCOUNTER — OFFICE VISIT (OUTPATIENT)
Dept: HEMATOLOGY | Age: 45
End: 2025-05-12
Payer: COMMERCIAL

## 2025-05-12 ENCOUNTER — HOSPITAL ENCOUNTER (OUTPATIENT)
Dept: INFUSION THERAPY | Age: 45
Discharge: HOME | End: 2025-05-12
Payer: COMMERCIAL

## 2025-05-12 VITALS
HEART RATE: 92 BPM | BODY MASS INDEX: 20.33 KG/M2 | WEIGHT: 126.5 LBS | DIASTOLIC BLOOD PRESSURE: 66 MMHG | SYSTOLIC BLOOD PRESSURE: 92 MMHG | OXYGEN SATURATION: 98 % | TEMPERATURE: 98 F | RESPIRATION RATE: 16 BRPM | HEIGHT: 66 IN

## 2025-05-12 DIAGNOSIS — R88.8 VERY LOW IRON STORES IN BONE MARROW: ICD-10-CM

## 2025-05-12 DIAGNOSIS — E53.8 B12 DEFICIENCY: ICD-10-CM

## 2025-05-12 DIAGNOSIS — D69.3 IDIOPATHIC THROMBOCYTOPENIC PURPURA (HCC): ICD-10-CM

## 2025-05-12 DIAGNOSIS — D69.3 IDIOPATHIC THROMBOCYTOPENIC PURPURA (HCC): Primary | ICD-10-CM

## 2025-05-12 DIAGNOSIS — D72.819 LEUKOPENIA, UNSPECIFIED TYPE: ICD-10-CM

## 2025-05-12 DIAGNOSIS — Z87.42 HISTORY OF ABNORMAL MENSTRUAL CYCLE: ICD-10-CM

## 2025-05-12 LAB
ALBUMIN SERPL-MCNC: 4.1 G/DL (ref 3.5–5.2)
ALP SERPL-CCNC: 34 U/L (ref 35–104)
ALT SERPL-CCNC: 11 U/L (ref 5–33)
ANION GAP SERPL CALCULATED.3IONS-SCNC: 11 MMOL/L (ref 7–19)
AST SERPL-CCNC: 14 U/L (ref 5–32)
BASOPHILS # BLD: 0.02 K/UL (ref 0–0.2)
BASOPHILS NFR BLD: 0.6 % (ref 0–1)
BILIRUB SERPL-MCNC: <0.2 MG/DL (ref 0–1.2)
BUN SERPL-MCNC: 12 MG/DL (ref 6–20)
CALCIUM SERPL-MCNC: 8.4 MG/DL (ref 8.6–10)
CHLORIDE SERPL-SCNC: 111 MMOL/L (ref 98–107)
CO2 SERPL-SCNC: 20 MMOL/L (ref 22–29)
CREAT SERPL-MCNC: 0.9 MG/DL (ref 0.5–0.9)
EOSINOPHIL # BLD: 0.02 K/UL (ref 0–0.6)
EOSINOPHIL NFR BLD: 0.6 % (ref 0–5)
ERYTHROCYTE [DISTWIDTH] IN BLOOD BY AUTOMATED COUNT: 12.2 % (ref 11.5–14.5)
FERRITIN SERPL-MCNC: 27.5 NG/ML (ref 13–150)
GLUCOSE SERPL-MCNC: 84 MG/DL (ref 70–99)
HCT VFR BLD AUTO: 43.5 % (ref 37–47)
HGB BLD-MCNC: 14 G/DL (ref 12–16)
IRON SATN MFR SERPL: 29 % (ref 15–50)
IRON SERPL-MCNC: 97 UG/DL (ref 37–145)
LYMPHOCYTES # BLD: 1.26 K/UL (ref 1.1–4.5)
LYMPHOCYTES NFR BLD: 35.9 % (ref 20–40)
MCH RBC QN AUTO: 31.6 PG (ref 27–31)
MCHC RBC AUTO-ENTMCNC: 32.2 G/DL (ref 33–37)
MCV RBC AUTO: 98.2 FL (ref 81–99)
MONOCYTES # BLD: 0.36 K/UL (ref 0–0.9)
MONOCYTES NFR BLD: 10.3 % (ref 1–10)
NEUTROPHILS # BLD: 1.84 K/UL (ref 1.5–7.5)
NEUTS SEG NFR BLD: 52.3 % (ref 50–65)
PLATELET # BLD AUTO: 142 K/UL (ref 130–400)
PMV BLD AUTO: 9.7 FL (ref 9.4–12.3)
POTASSIUM SERPL-SCNC: 4.4 MMOL/L (ref 3.5–5.1)
PROT SERPL-MCNC: 6.5 G/DL (ref 6.4–8.3)
RBC # BLD AUTO: 4.43 M/UL (ref 4.2–5.4)
SODIUM SERPL-SCNC: 142 MMOL/L (ref 136–145)
TIBC SERPL-MCNC: 332 UG/DL (ref 250–400)
VIT B12 SERPL-MCNC: 377 PG/ML (ref 232–1245)
WBC # BLD AUTO: 3.51 K/UL (ref 4.8–10.8)

## 2025-05-12 PROCEDURE — 85025 COMPLETE CBC W/AUTO DIFF WBC: CPT

## 2025-05-12 PROCEDURE — 82728 ASSAY OF FERRITIN: CPT

## 2025-05-12 PROCEDURE — 99214 OFFICE O/P EST MOD 30 MIN: CPT | Performed by: NURSE PRACTITIONER

## 2025-05-12 PROCEDURE — 83540 ASSAY OF IRON: CPT

## 2025-05-12 PROCEDURE — 80053 COMPREHEN METABOLIC PANEL: CPT

## 2025-05-12 PROCEDURE — 83550 IRON BINDING TEST: CPT

## 2025-05-12 PROCEDURE — 82607 VITAMIN B-12: CPT

## 2025-05-12 PROCEDURE — 99212 OFFICE O/P EST SF 10 MIN: CPT

## 2025-05-12 PROCEDURE — 36415 COLL VENOUS BLD VENIPUNCTURE: CPT

## 2025-05-13 DIAGNOSIS — F90.9 ATTENTION DEFICIT HYPERACTIVITY DISORDER (ADHD), UNSPECIFIED ADHD TYPE: ICD-10-CM

## 2025-05-13 DIAGNOSIS — G43.109 MIGRAINE WITH AURA AND WITHOUT STATUS MIGRAINOSUS, NOT INTRACTABLE: ICD-10-CM

## 2025-05-13 DIAGNOSIS — R51.9 ACUTE NONINTRACTABLE HEADACHE, UNSPECIFIED HEADACHE TYPE: ICD-10-CM

## 2025-05-13 DIAGNOSIS — F41.9 ANXIETY: ICD-10-CM

## 2025-05-13 RX ORDER — BUTALBITAL, ACETAMINOPHEN AND CAFFEINE 50; 325; 40 MG/1; MG/1; MG/1
1 TABLET ORAL EVERY 6 HOURS PRN
Qty: 30 TABLET | Refills: 0 | Status: SHIPPED | OUTPATIENT
Start: 2025-05-13

## 2025-05-13 RX ORDER — DEXTROAMPHETAMINE SACCHARATE, AMPHETAMINE ASPARTATE MONOHYDRATE, DEXTROAMPHETAMINE SULFATE AND AMPHETAMINE SULFATE 7.5; 7.5; 7.5; 7.5 MG/1; MG/1; MG/1; MG/1
30 CAPSULE, EXTENDED RELEASE ORAL DAILY
Qty: 30 CAPSULE | Refills: 0 | Status: SHIPPED | OUTPATIENT
Start: 2025-05-13 | End: 2025-06-12

## 2025-05-13 RX ORDER — UBROGEPANT 100 MG/1
TABLET ORAL
Qty: 16 TABLET | Refills: 3 | Status: SHIPPED | OUTPATIENT
Start: 2025-05-13

## 2025-05-13 RX ORDER — ONDANSETRON 8 MG/1
8 TABLET, FILM COATED ORAL EVERY 8 HOURS PRN
Qty: 20 TABLET | Refills: 1 | Status: SHIPPED | OUTPATIENT
Start: 2025-05-13

## 2025-05-13 RX ORDER — CLONAZEPAM 1 MG/1
TABLET ORAL
Qty: 45 TABLET | Refills: 0 | Status: SHIPPED | OUTPATIENT
Start: 2025-05-13 | End: 2025-06-10

## 2025-05-14 ENCOUNTER — RESULTS FOLLOW-UP (OUTPATIENT)
Dept: HEMATOLOGY | Age: 45
End: 2025-05-14

## 2025-05-17 ASSESSMENT — ENCOUNTER SYMPTOMS
SORE THROAT: 0
CONSTIPATION: 0
NAUSEA: 0
EYES NEGATIVE: 1
EYE REDNESS: 0
EYE DISCHARGE: 0
GASTROINTESTINAL NEGATIVE: 1
RESPIRATORY NEGATIVE: 1
SHORTNESS OF BREATH: 0
DIARRHEA: 0
BACK PAIN: 0
ABDOMINAL PAIN: 0
VOMITING: 0
EYE PAIN: 0
WHEEZING: 0
BLOOD IN STOOL: 0
COUGH: 0

## 2025-06-12 DIAGNOSIS — F41.9 ANXIETY: ICD-10-CM

## 2025-06-12 DIAGNOSIS — F90.9 ATTENTION DEFICIT HYPERACTIVITY DISORDER (ADHD), UNSPECIFIED ADHD TYPE: ICD-10-CM

## 2025-06-12 DIAGNOSIS — R51.9 ACUTE NONINTRACTABLE HEADACHE, UNSPECIFIED HEADACHE TYPE: ICD-10-CM

## 2025-06-12 RX ORDER — ONDANSETRON 8 MG/1
8 TABLET, FILM COATED ORAL EVERY 8 HOURS PRN
Qty: 20 TABLET | Refills: 1 | Status: SHIPPED | OUTPATIENT
Start: 2025-06-12

## 2025-06-12 RX ORDER — CLONAZEPAM 1 MG/1
TABLET ORAL
Qty: 45 TABLET | Refills: 0 | Status: SHIPPED | OUTPATIENT
Start: 2025-06-12 | End: 2025-07-10

## 2025-06-12 RX ORDER — DEXTROAMPHETAMINE SACCHARATE, AMPHETAMINE ASPARTATE MONOHYDRATE, DEXTROAMPHETAMINE SULFATE AND AMPHETAMINE SULFATE 7.5; 7.5; 7.5; 7.5 MG/1; MG/1; MG/1; MG/1
30 CAPSULE, EXTENDED RELEASE ORAL DAILY
Qty: 30 CAPSULE | Refills: 0 | Status: SHIPPED | OUTPATIENT
Start: 2025-06-12 | End: 2025-07-12

## 2025-06-12 RX ORDER — BUTALBITAL, ACETAMINOPHEN AND CAFFEINE 50; 325; 40 MG/1; MG/1; MG/1
1 TABLET ORAL EVERY 6 HOURS PRN
Qty: 30 TABLET | Refills: 0 | Status: SHIPPED | OUTPATIENT
Start: 2025-06-12

## 2025-06-12 RX ORDER — NORETHINDRONE ACETATE AND ETHINYL ESTRADIOL 1.5-30(21)
1 KIT ORAL DAILY
Qty: 3 PACKET | Refills: 3 | Status: SHIPPED | OUTPATIENT
Start: 2025-06-12

## 2025-06-12 NOTE — TELEPHONE ENCOUNTER
PDMP Monitoring:    Last PDMP Greg as Reviewed ([place]):  Review User Review Instant Review Result   YANY SUKHWINDER 4/14/2025  9:56 AM Reviewed PDMP [1]     Urine Drug Screenings (1 yr)       POCT Rapid Drug Screen  Collected: 12/10/2024  2:16 PM (Final result)              POCT Rapid Drug Screen  Collected: 8/9/2023  2:08 PM (Final result)                  Medication Contract and Consent for Opioid Use Documents Filed       Patient Documents       Type of Document Status Date Received Received By Description    Medication Contract Received 12/10/2024  2:17 PM VEDA SHORE 12-10-24 Medication agreement

## 2025-06-30 ENCOUNTER — TRANSCRIBE ORDERS (OUTPATIENT)
Dept: ADMINISTRATIVE | Facility: HOSPITAL | Age: 45
End: 2025-06-30
Payer: COMMERCIAL

## 2025-06-30 DIAGNOSIS — G43.719 INTRACTABLE CHRONIC MIGRAINE WITHOUT AURA AND WITHOUT STATUS MIGRAINOSUS: Primary | ICD-10-CM

## 2025-07-03 DIAGNOSIS — G43.109 MIGRAINE WITH AURA AND WITHOUT STATUS MIGRAINOSUS, NOT INTRACTABLE: ICD-10-CM

## 2025-07-07 RX ORDER — UBROGEPANT 100 MG/1
TABLET ORAL
Qty: 16 TABLET | Refills: 3 | Status: SHIPPED | OUTPATIENT
Start: 2025-07-07

## 2025-07-09 ENCOUNTER — HOSPITAL ENCOUNTER (EMERGENCY)
Age: 45
Discharge: HOME | End: 2025-07-09
Attending: EMERGENCY MEDICINE
Payer: COMMERCIAL

## 2025-07-09 ENCOUNTER — APPOINTMENT (OUTPATIENT)
Dept: CT IMAGING | Age: 45
End: 2025-07-09
Payer: COMMERCIAL

## 2025-07-09 VITALS
HEIGHT: 67 IN | SYSTOLIC BLOOD PRESSURE: 103 MMHG | RESPIRATION RATE: 13 BRPM | BODY MASS INDEX: 18.99 KG/M2 | HEART RATE: 71 BPM | OXYGEN SATURATION: 91 % | TEMPERATURE: 98.1 F | WEIGHT: 121 LBS | DIASTOLIC BLOOD PRESSURE: 72 MMHG

## 2025-07-09 DIAGNOSIS — R10.13 ABDOMINAL PAIN, EPIGASTRIC: Primary | ICD-10-CM

## 2025-07-09 DIAGNOSIS — K92.0 HEMATEMESIS WITH NAUSEA: ICD-10-CM

## 2025-07-09 DIAGNOSIS — R19.7 DIARRHEA, UNSPECIFIED TYPE: ICD-10-CM

## 2025-07-09 LAB
ALBUMIN SERPL-MCNC: 4 G/DL (ref 3.5–5.2)
ALP SERPL-CCNC: 35 U/L (ref 35–104)
ALT SERPL-CCNC: 12 U/L (ref 10–35)
ANION GAP SERPL CALCULATED.3IONS-SCNC: 11 MMOL/L (ref 8–16)
APTT PPP: 25.4 SEC (ref 26–36.2)
AST SERPL-CCNC: 14 U/L (ref 10–35)
BASOPHILS # BLD: 0 K/UL (ref 0–0.2)
BASOPHILS NFR BLD: 0.4 % (ref 0–1)
BILIRUB SERPL-MCNC: <0.2 MG/DL (ref 0.2–1.2)
BUN SERPL-MCNC: 16 MG/DL (ref 6–20)
CALCIUM SERPL-MCNC: 8.4 MG/DL (ref 8.6–10)
CHLORIDE SERPL-SCNC: 111 MMOL/L (ref 98–107)
CO2 SERPL-SCNC: 18 MMOL/L (ref 22–29)
CREAT SERPL-MCNC: 0.9 MG/DL (ref 0.5–0.9)
EOSINOPHIL # BLD: 0 K/UL (ref 0–0.6)
EOSINOPHIL NFR BLD: 0.2 % (ref 0–5)
ERYTHROCYTE [DISTWIDTH] IN BLOOD BY AUTOMATED COUNT: 12.9 % (ref 11.5–14.5)
GLUCOSE SERPL-MCNC: 90 MG/DL (ref 70–99)
HCG SERPL QL: NEGATIVE
HCT VFR BLD AUTO: 41.2 % (ref 37–47)
HGB BLD-MCNC: 13.5 G/DL (ref 12–16)
IMM GRANULOCYTES # BLD: 0 K/UL
INR PPP: 1.12 (ref 0.88–1.18)
LIPASE SERPL-CCNC: 35 U/L (ref 13–60)
LYMPHOCYTES # BLD: 1.5 K/UL (ref 1.1–4.5)
LYMPHOCYTES NFR BLD: 31.3 % (ref 20–40)
MCH RBC QN AUTO: 31.5 PG (ref 27–31)
MCHC RBC AUTO-ENTMCNC: 32.8 G/DL (ref 33–37)
MCV RBC AUTO: 96 FL (ref 81–99)
MONOCYTES # BLD: 0.3 K/UL (ref 0–0.9)
MONOCYTES NFR BLD: 6.2 % (ref 0–10)
NEUTROPHILS # BLD: 3 K/UL (ref 1.5–7.5)
NEUTS SEG NFR BLD: 61.7 % (ref 50–65)
PLATELET # BLD AUTO: 157 K/UL (ref 130–400)
PMV BLD AUTO: 9.9 FL (ref 9.4–12.3)
POTASSIUM SERPL-SCNC: 3.7 MMOL/L (ref 3.5–5.1)
PROT SERPL-MCNC: 6.4 G/DL (ref 6.4–8.3)
PROTHROMBIN TIME: 14.3 SEC (ref 12–14.6)
RBC # BLD AUTO: 4.29 M/UL (ref 4.2–5.4)
SODIUM SERPL-SCNC: 140 MMOL/L (ref 136–145)
WBC # BLD AUTO: 4.8 K/UL (ref 4.8–10.8)

## 2025-07-09 PROCEDURE — 85730 THROMBOPLASTIN TIME PARTIAL: CPT

## 2025-07-09 PROCEDURE — 99284 EMERGENCY DEPT VISIT MOD MDM: CPT

## 2025-07-09 PROCEDURE — 96375 TX/PRO/DX INJ NEW DRUG ADDON: CPT

## 2025-07-09 PROCEDURE — 96376 TX/PRO/DX INJ SAME DRUG ADON: CPT

## 2025-07-09 PROCEDURE — 36415 COLL VENOUS BLD VENIPUNCTURE: CPT

## 2025-07-09 PROCEDURE — 85025 COMPLETE CBC W/AUTO DIFF WBC: CPT

## 2025-07-09 PROCEDURE — 83690 ASSAY OF LIPASE: CPT

## 2025-07-09 PROCEDURE — 96374 THER/PROPH/DIAG INJ IV PUSH: CPT

## 2025-07-09 PROCEDURE — 2580000003 HC RX 258: Performed by: EMERGENCY MEDICINE

## 2025-07-09 PROCEDURE — 85610 PROTHROMBIN TIME: CPT

## 2025-07-09 PROCEDURE — 6360000002 HC RX W HCPCS: Performed by: EMERGENCY MEDICINE

## 2025-07-09 PROCEDURE — 6370000000 HC RX 637 (ALT 250 FOR IP): Performed by: EMERGENCY MEDICINE

## 2025-07-09 PROCEDURE — 84703 CHORIONIC GONADOTROPIN ASSAY: CPT

## 2025-07-09 PROCEDURE — 80053 COMPREHEN METABOLIC PANEL: CPT

## 2025-07-09 PROCEDURE — 74176 CT ABD & PELVIS W/O CONTRAST: CPT

## 2025-07-09 RX ORDER — PANTOPRAZOLE SODIUM 40 MG/10ML
40 INJECTION, POWDER, LYOPHILIZED, FOR SOLUTION INTRAVENOUS ONCE
Status: COMPLETED | OUTPATIENT
Start: 2025-07-09 | End: 2025-07-09

## 2025-07-09 RX ORDER — MORPHINE SULFATE 4 MG/ML
4 INJECTION, SOLUTION INTRAMUSCULAR; INTRAVENOUS ONCE
Refills: 0 | Status: COMPLETED | OUTPATIENT
Start: 2025-07-09 | End: 2025-07-09

## 2025-07-09 RX ORDER — SODIUM CHLORIDE, SODIUM LACTATE, POTASSIUM CHLORIDE, AND CALCIUM CHLORIDE .6; .31; .03; .02 G/100ML; G/100ML; G/100ML; G/100ML
1000 INJECTION, SOLUTION INTRAVENOUS ONCE
Status: COMPLETED | OUTPATIENT
Start: 2025-07-09 | End: 2025-07-09

## 2025-07-09 RX ORDER — SUCRALFATE 1 G/1
1 TABLET ORAL 4 TIMES DAILY
Qty: 28 TABLET | Refills: 0 | Status: SHIPPED | OUTPATIENT
Start: 2025-07-09 | End: 2025-07-16

## 2025-07-09 RX ORDER — PANTOPRAZOLE SODIUM 40 MG/1
40 TABLET, DELAYED RELEASE ORAL DAILY
Qty: 30 TABLET | Refills: 1 | Status: SHIPPED | OUTPATIENT
Start: 2025-07-09

## 2025-07-09 RX ORDER — ONDANSETRON 4 MG/1
4 TABLET, ORALLY DISINTEGRATING ORAL 3 TIMES DAILY PRN
Qty: 21 TABLET | Refills: 0 | Status: SHIPPED | OUTPATIENT
Start: 2025-07-09

## 2025-07-09 RX ORDER — ONDANSETRON 2 MG/ML
4 INJECTION INTRAMUSCULAR; INTRAVENOUS ONCE
Status: COMPLETED | OUTPATIENT
Start: 2025-07-09 | End: 2025-07-09

## 2025-07-09 RX ADMIN — SODIUM CHLORIDE, SODIUM LACTATE, POTASSIUM CHLORIDE, AND CALCIUM CHLORIDE 1000 ML: 600; 310; 30; 20 INJECTION, SOLUTION INTRAVENOUS at 19:48

## 2025-07-09 RX ADMIN — ONDANSETRON 4 MG: 2 INJECTION, SOLUTION INTRAMUSCULAR; INTRAVENOUS at 19:44

## 2025-07-09 RX ADMIN — MORPHINE SULFATE 4 MG: 4 INJECTION, SOLUTION INTRAMUSCULAR; INTRAVENOUS at 19:44

## 2025-07-09 RX ADMIN — PANTOPRAZOLE SODIUM 40 MG: 40 INJECTION, POWDER, FOR SOLUTION INTRAVENOUS at 19:43

## 2025-07-09 RX ADMIN — MORPHINE SULFATE 4 MG: 4 INJECTION, SOLUTION INTRAMUSCULAR; INTRAVENOUS at 21:11

## 2025-07-09 RX ADMIN — LIDOCAINE HYDROCHLORIDE: 20 SOLUTION ORAL at 20:31

## 2025-07-09 ASSESSMENT — LIFESTYLE VARIABLES
HOW MANY STANDARD DRINKS CONTAINING ALCOHOL DO YOU HAVE ON A TYPICAL DAY: PATIENT DOES NOT DRINK
HOW OFTEN DO YOU HAVE A DRINK CONTAINING ALCOHOL: NEVER

## 2025-07-09 ASSESSMENT — ENCOUNTER SYMPTOMS
VOMITING: 1
DIARRHEA: 1
BLOOD IN STOOL: 0
SORE THROAT: 0
COUGH: 0
BACK PAIN: 0
NAUSEA: 1
SHORTNESS OF BREATH: 0
RHINORRHEA: 0
ABDOMINAL PAIN: 1

## 2025-07-09 ASSESSMENT — PAIN SCALES - GENERAL: PAINLEVEL_OUTOF10: 3

## 2025-07-09 ASSESSMENT — PAIN DESCRIPTION - LOCATION: LOCATION: ABDOMEN

## 2025-07-09 ASSESSMENT — PAIN - FUNCTIONAL ASSESSMENT: PAIN_FUNCTIONAL_ASSESSMENT: 0-10

## 2025-07-10 ENCOUNTER — TELEPHONE (OUTPATIENT)
Dept: PRIMARY CARE CLINIC | Age: 45
End: 2025-07-10

## 2025-07-10 ENCOUNTER — OFFICE VISIT (OUTPATIENT)
Dept: PRIMARY CARE CLINIC | Age: 45
End: 2025-07-10
Payer: COMMERCIAL

## 2025-07-10 VITALS
HEART RATE: 92 BPM | DIASTOLIC BLOOD PRESSURE: 74 MMHG | OXYGEN SATURATION: 100 % | HEIGHT: 67 IN | TEMPERATURE: 97.6 F | SYSTOLIC BLOOD PRESSURE: 110 MMHG | WEIGHT: 127 LBS | RESPIRATION RATE: 16 BRPM | BODY MASS INDEX: 19.93 KG/M2

## 2025-07-10 DIAGNOSIS — K52.9 INFLAMMATORY BOWEL DISEASE: ICD-10-CM

## 2025-07-10 DIAGNOSIS — R19.7 DIARRHEA, UNSPECIFIED TYPE: Primary | ICD-10-CM

## 2025-07-10 DIAGNOSIS — F90.9 ATTENTION DEFICIT HYPERACTIVITY DISORDER (ADHD), UNSPECIFIED ADHD TYPE: ICD-10-CM

## 2025-07-10 DIAGNOSIS — R51.9 ACUTE NONINTRACTABLE HEADACHE, UNSPECIFIED HEADACHE TYPE: ICD-10-CM

## 2025-07-10 DIAGNOSIS — F39 MOOD DISORDER: ICD-10-CM

## 2025-07-10 DIAGNOSIS — K92.0 HEMATEMESIS, UNSPECIFIED WHETHER NAUSEA PRESENT: ICD-10-CM

## 2025-07-10 DIAGNOSIS — Z91.013 SHELLFISH ALLERGY: ICD-10-CM

## 2025-07-10 DIAGNOSIS — R11.10 VOMITING, UNSPECIFIED VOMITING TYPE, UNSPECIFIED WHETHER NAUSEA PRESENT: ICD-10-CM

## 2025-07-10 DIAGNOSIS — F41.9 ANXIETY: ICD-10-CM

## 2025-07-10 PROCEDURE — 99214 OFFICE O/P EST MOD 30 MIN: CPT | Performed by: FAMILY MEDICINE

## 2025-07-10 RX ORDER — CLONAZEPAM 1 MG/1
TABLET ORAL
Qty: 45 TABLET | Refills: 0 | Status: SHIPPED | OUTPATIENT
Start: 2025-07-10 | End: 2025-08-07

## 2025-07-10 RX ORDER — BUTALBITAL, ACETAMINOPHEN AND CAFFEINE 50; 325; 40 MG/1; MG/1; MG/1
1 TABLET ORAL EVERY 6 HOURS PRN
Qty: 30 TABLET | Refills: 0 | Status: SHIPPED | OUTPATIENT
Start: 2025-07-10

## 2025-07-10 RX ORDER — EPINEPHRINE 0.3 MG/.3ML
0.3 INJECTION SUBCUTANEOUS ONCE
Qty: 2 EACH | Refills: 0 | Status: SHIPPED | OUTPATIENT
Start: 2025-07-10 | End: 2025-07-10

## 2025-07-10 RX ORDER — DEXTROAMPHETAMINE SACCHARATE, AMPHETAMINE ASPARTATE MONOHYDRATE, DEXTROAMPHETAMINE SULFATE AND AMPHETAMINE SULFATE 7.5; 7.5; 7.5; 7.5 MG/1; MG/1; MG/1; MG/1
30 CAPSULE, EXTENDED RELEASE ORAL DAILY
Qty: 30 CAPSULE | Refills: 0 | Status: SHIPPED | OUTPATIENT
Start: 2025-07-10 | End: 2025-08-09

## 2025-07-10 ASSESSMENT — ENCOUNTER SYMPTOMS
NAUSEA: 1
EYE DISCHARGE: 0
WHEEZING: 0
VOMITING: 1
BACK PAIN: 0
ABDOMINAL PAIN: 0
COLOR CHANGE: 0
COUGH: 0
DIARRHEA: 1

## 2025-07-10 NOTE — ED PROVIDER NOTES
[facility] EMERGENCY DEPARTMENT  eMERGENCY dEPARTMENT eNCOUnter      Pt Name: Dionne Guzman  MRN: [id number]  Birthdate 1980  Date of evaluation: 7/9/2025  Provider: BENITO LEWIS MD    CHIEF COMPLAINT       Chief Complaint   Patient presents with    Vomiting     Emesis x 3 days,  bloody stools started today    Abdominal Pain     Abd pain x 3 days, with diarrhea         HISTORY OF PRESENT ILLNESS   (Location/Symptom, Timing/Onset,Context/Setting, Quality, Duration, Modifying Factors, Severity)  Note limiting factors.   Dionne Guzman is a 44 y.o. female who presents to the emergency department for concern of 2 episodes of hematemesis and acidic stools the last several days.  She denies any dark or bloody stools.  Complains of epigastric abdominal discomfort.  States hematemesis episodes or bright red blood denies any clots states it is somewhat mixed in and floating with emesis does not describe any massive hematemesis.  She has dealt with similar epigastric discomfort in the past and has been diagnosed with GERD and Sorensen's esophagus.  Denies any history of peptic ulcer disease and denies any NSAID abuse does use some Tylenol.  No alcohol abuse.  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  Estimates it has been several years since her last endoscopy.    HPI    NursingNotes were reviewed.    REVIEW OF SYSTEMS    (2-9 systems for level 4, 10 or more for level 5)     Review of Systems   Constitutional:  Negative for chills and fever.   HENT:  Negative for rhinorrhea and sore throat.    Respiratory:  Negative for cough and shortness of breath.    Cardiovascular:  Negative for chest pain.   Gastrointestinal:  Positive for abdominal pain, diarrhea, nausea and vomiting. Negative for blood in stool.   Genitourinary:  Negative for dysuria and frequency.   Musculoskeletal:  Negative for back pain and neck pain.   Neurological:  Negative for dizziness and headaches.   All other systems reviewed and are

## 2025-07-10 NOTE — PROGRESS NOTES
SUBJECTIVE:    Patient ID: Dionne Guzman is a 44 y.o. female.    History of Present Illness  The patient presents for evaluation of vomiting blood, diarrhea, allergic reaction, and anxiety.    She began experiencing hematemesis yesterday, which was preceded by persistent diarrhea that started 4 days ago. The diarrhea has since subsided. She reports no recent illness or travel, except for a trip to [place]. She sought emergency care last night where a CT scan was performed, but no stool test was conducted. An endoscopy and colonoscopy were recommended. She was prescribed Zofran in the ER and is requesting refills.    She experienced a severe allergic reaction characterized by swelling of her eyes, throat, and tongue, as well as blister formation. She did not seek emergency care for this but managed it with double doses of Benadryl. She is allergic to contrast dye and had an allergic reaction 2 weeks ago.    She is currently taking Klonopin as needed for anxiety and is considering daily medication due to sleep disturbances. She has previously tried Celexa, Zoloft, Lexapro, Wellbutrin, and Prozac for anxiety but discontinued them due to side effects. She has not tried Trintellix.      Past Medical History:   Diagnosis Date    ADHD (attention deficit hyperactivity disorder)     Anxiety     Clotting disorder     factor 14, 11    GERD (gastroesophageal reflux disease)     Headache     Irritable bowel syndrome       Current Outpatient Medications on File Prior to Visit   Medication Sig Dispense Refill    pantoprazole (PROTONIX) 40 MG tablet Take 1 tablet by mouth daily 30 tablet 1    ondansetron (ZOFRAN-ODT) 4 MG disintegrating tablet Take 1 tablet by mouth 3 times daily as needed for Nausea or Vomiting 21 tablet 0    sucralfate (CARAFATE) 1 GM tablet Take 1 tablet by mouth 4 times daily for 7 days 28 tablet 0    UBRELVY 100 MG TABS TAKE 1 TABLET BY MOUTH DAILY AS NEEDED & MAY REPEAT ONCE IN 4 HOURS IF NEEDED 16 tablet 3

## 2025-07-15 ENCOUNTER — TELEPHONE (OUTPATIENT)
Dept: PRIMARY CARE CLINIC | Age: 45
End: 2025-07-15

## 2025-07-15 NOTE — TELEPHONE ENCOUNTER
Pt states Trintellex has been denied. Pharmacy states Paxil or Prozac is covered. Pt asking to be changed to either one. [facility].

## 2025-07-16 RX ORDER — PAROXETINE 10 MG/1
10 TABLET, FILM COATED ORAL DAILY
Qty: 30 TABLET | Refills: 3 | Status: SHIPPED | OUTPATIENT
Start: 2025-07-16

## 2025-07-21 ENCOUNTER — HOSPITAL ENCOUNTER (OUTPATIENT)
Dept: MRI IMAGING | Facility: HOSPITAL | Age: 45
Discharge: HOME | End: 2025-07-21
Admitting: PHYSICIAN ASSISTANT
Payer: COMMERCIAL

## 2025-07-21 DIAGNOSIS — G43.719 INTRACTABLE CHRONIC MIGRAINE WITHOUT AURA AND WITHOUT STATUS MIGRAINOSUS: ICD-10-CM

## 2025-07-21 PROCEDURE — 70551 MRI BRAIN STEM W/O DYE: CPT

## 2025-07-28 ENCOUNTER — OFFICE VISIT (OUTPATIENT)
Dept: GASTROENTEROLOGY | Age: 45
End: 2025-07-28
Payer: COMMERCIAL

## 2025-07-28 VITALS
BODY MASS INDEX: 20.4 KG/M2 | DIASTOLIC BLOOD PRESSURE: 70 MMHG | HEIGHT: 67 IN | HEART RATE: 108 BPM | SYSTOLIC BLOOD PRESSURE: 122 MMHG | WEIGHT: 130 LBS | OXYGEN SATURATION: 98 %

## 2025-07-28 DIAGNOSIS — R11.0 CHRONIC NAUSEA: ICD-10-CM

## 2025-07-28 DIAGNOSIS — R93.5 ABNORMAL ABDOMINAL CT SCAN: ICD-10-CM

## 2025-07-28 DIAGNOSIS — R93.5 ABNORMAL ABDOMINAL CT SCAN: Primary | ICD-10-CM

## 2025-07-28 LAB
CRP SERPL-MCNC: 6.3 MG/L (ref 0–5)
ERYTHROCYTE [SEDIMENTATION RATE] IN BLOOD BY WESTERGREN METHOD: 2 MM/HR (ref 0–20)

## 2025-07-28 PROCEDURE — 99214 OFFICE O/P EST MOD 30 MIN: CPT | Performed by: NURSE PRACTITIONER

## 2025-07-28 ASSESSMENT — ENCOUNTER SYMPTOMS
ABDOMINAL PAIN: 0
ABDOMINAL DISTENTION: 0
CHOKING: 0
DIARRHEA: 1
BLOOD IN STOOL: 0
ANAL BLEEDING: 0
SHORTNESS OF BREATH: 0
NAUSEA: 1
VOMITING: 1
TROUBLE SWALLOWING: 0
COUGH: 0
CONSTIPATION: 0
RECTAL PAIN: 0

## 2025-07-28 NOTE — PROGRESS NOTES
Subjective:     Patient ID: Dionne Guzman is a 44 y.o. female  PCP: Marla Cortez MD  Referring Provider: Marla Cortez*    HPI  Patient presents to the office today with the following complaints: Other (DIARRHEA; VOMITING; HEMATEMESIS; IBD)      History of Present Illness  The patient presents for evaluation of nausea and vomiting.    She reports a gradual improvement in her condition. Her symptoms began on 07/10/2025, prompting an emergency room visit where she was administered morphine, which unfortunately exacerbated her chest pain. She was also given Milk of Magnesia and lidocaine to drink, which initially provided relief but was followed by a recurrence of pain. The administration of additional morphine intensified her discomfort. Upon returning home, she experienced persistent symptoms for 10 days, including inability to retain food or liquids, leading to vomiting of bile. She did not notice any blood in her vomit. Her stools were watery and green, with no evidence of black, tarry, or bloody stools. She also reported abdominal pain. A CT scan revealed thickening in the initial part of her small intestine. Her bowel movements have been irregular due to medication, and her diarrhea has ceased. She has been unable to eat and last vomited on 07/26/2025. A stool test conducted by PCP yielded negative results.    Prior to this episode, she frequently experienced nausea and could only consume small amounts of food. She also reported dry heaving and phlegm production. During her first emergency room visit, she vomited blood, but subsequent episodes were characterized by phlegm production. She was prescribed Zofran for nausea and continues to take Protonix (pantoprazole). She underwent endoscopy and colonoscopy last year due to bleeding.    SOCIAL HISTORY  Marital Status: The patient has a fiancé.  Diet: The patient followed the BRAT diet.    FAMILY HISTORY  - Aunt: Crohn's disease  - Uncle:

## 2025-07-29 DIAGNOSIS — R93.5 ABNORMAL ABDOMINAL CT SCAN: ICD-10-CM

## 2025-07-29 DIAGNOSIS — R11.0 CHRONIC NAUSEA: ICD-10-CM

## 2025-08-02 LAB — CALPROTECTIN STL-MCNT: 73 UG/G

## 2025-08-07 ENCOUNTER — ANESTHESIA EVENT (OUTPATIENT)
Dept: OPERATING ROOM | Age: 45
End: 2025-08-07

## 2025-08-07 ENCOUNTER — HOSPITAL ENCOUNTER (OUTPATIENT)
Age: 45
Setting detail: OUTPATIENT SURGERY
Discharge: HOME | End: 2025-08-07
Attending: INTERNAL MEDICINE | Admitting: INTERNAL MEDICINE

## 2025-08-07 ENCOUNTER — ANESTHESIA (OUTPATIENT)
Dept: OPERATING ROOM | Age: 45
End: 2025-08-07

## 2025-08-07 ENCOUNTER — APPOINTMENT (OUTPATIENT)
Dept: OPERATING ROOM | Age: 45
End: 2025-08-07
Attending: INTERNAL MEDICINE

## 2025-08-07 ENCOUNTER — HOSPITAL ENCOUNTER (OUTPATIENT)
Age: 45
Setting detail: SPECIMEN
Discharge: HOME | End: 2025-08-07

## 2025-08-07 VITALS
WEIGHT: 122 LBS | TEMPERATURE: 98.3 F | HEIGHT: 66 IN | DIASTOLIC BLOOD PRESSURE: 74 MMHG | RESPIRATION RATE: 18 BRPM | OXYGEN SATURATION: 100 % | BODY MASS INDEX: 19.61 KG/M2 | HEART RATE: 86 BPM | SYSTOLIC BLOOD PRESSURE: 103 MMHG

## 2025-08-07 PROCEDURE — 45380 COLONOSCOPY AND BIOPSY: CPT

## 2025-08-07 PROCEDURE — 43239 EGD BIOPSY SINGLE/MULTIPLE: CPT

## 2025-08-07 RX ORDER — FENTANYL CITRATE 50 UG/ML
INJECTION, SOLUTION INTRAMUSCULAR; INTRAVENOUS
Status: DISCONTINUED | OUTPATIENT
Start: 2025-08-07 | End: 2025-08-07 | Stop reason: SDUPTHER

## 2025-08-07 RX ORDER — PROPOFOL 10 MG/ML
INJECTION, EMULSION INTRAVENOUS
Status: DISCONTINUED | OUTPATIENT
Start: 2025-08-07 | End: 2025-08-07 | Stop reason: SDUPTHER

## 2025-08-07 RX ORDER — PANTOPRAZOLE SODIUM 40 MG/1
40 TABLET, DELAYED RELEASE ORAL 2 TIMES DAILY
Qty: 30 TABLET | Refills: 3 | Status: SHIPPED | OUTPATIENT
Start: 2025-08-07

## 2025-08-07 RX ORDER — SODIUM CHLORIDE, SODIUM LACTATE, POTASSIUM CHLORIDE, CALCIUM CHLORIDE 600; 310; 30; 20 MG/100ML; MG/100ML; MG/100ML; MG/100ML
INJECTION, SOLUTION INTRAVENOUS CONTINUOUS
Status: DISCONTINUED | OUTPATIENT
Start: 2025-08-07 | End: 2025-08-07 | Stop reason: HOSPADM

## 2025-08-07 RX ORDER — LIDOCAINE HYDROCHLORIDE 10 MG/ML
INJECTION, SOLUTION EPIDURAL; INFILTRATION; INTRACAUDAL; PERINEURAL
Status: DISCONTINUED | OUTPATIENT
Start: 2025-08-07 | End: 2025-08-07 | Stop reason: SDUPTHER

## 2025-08-07 RX ADMIN — SODIUM CHLORIDE, SODIUM LACTATE, POTASSIUM CHLORIDE, CALCIUM CHLORIDE: 600; 310; 30; 20 INJECTION, SOLUTION INTRAVENOUS at 12:28

## 2025-08-07 RX ADMIN — LIDOCAINE HYDROCHLORIDE 50 MG: 10 INJECTION, SOLUTION EPIDURAL; INFILTRATION; INTRACAUDAL; PERINEURAL at 13:18

## 2025-08-07 RX ADMIN — FENTANYL CITRATE 100 MCG: 50 INJECTION, SOLUTION INTRAMUSCULAR; INTRAVENOUS at 13:18

## 2025-08-07 RX ADMIN — PROPOFOL 500 MG: 10 INJECTION, EMULSION INTRAVENOUS at 13:18

## 2025-08-07 ASSESSMENT — PAIN - FUNCTIONAL ASSESSMENT
PAIN_FUNCTIONAL_ASSESSMENT: NONE - DENIES PAIN
PAIN_FUNCTIONAL_ASSESSMENT: 0-10

## 2025-08-11 DIAGNOSIS — F90.9 ATTENTION DEFICIT HYPERACTIVITY DISORDER (ADHD), UNSPECIFIED ADHD TYPE: ICD-10-CM

## 2025-08-11 DIAGNOSIS — R51.9 ACUTE NONINTRACTABLE HEADACHE, UNSPECIFIED HEADACHE TYPE: ICD-10-CM

## 2025-08-11 DIAGNOSIS — F41.9 ANXIETY: ICD-10-CM

## 2025-08-11 DIAGNOSIS — G43.109 MIGRAINE WITH AURA AND WITHOUT STATUS MIGRAINOSUS, NOT INTRACTABLE: ICD-10-CM

## 2025-08-11 RX ORDER — BUTALBITAL, ACETAMINOPHEN AND CAFFEINE 50; 325; 40 MG/1; MG/1; MG/1
1 TABLET ORAL EVERY 6 HOURS PRN
Qty: 30 TABLET | Refills: 0 | Status: SHIPPED | OUTPATIENT
Start: 2025-08-11

## 2025-08-11 RX ORDER — DEXTROAMPHETAMINE SACCHARATE, AMPHETAMINE ASPARTATE MONOHYDRATE, DEXTROAMPHETAMINE SULFATE AND AMPHETAMINE SULFATE 7.5; 7.5; 7.5; 7.5 MG/1; MG/1; MG/1; MG/1
30 CAPSULE, EXTENDED RELEASE ORAL DAILY
Qty: 30 CAPSULE | Refills: 0 | Status: SHIPPED | OUTPATIENT
Start: 2025-08-11 | End: 2025-09-10

## 2025-08-11 RX ORDER — ONDANSETRON 8 MG/1
8 TABLET, FILM COATED ORAL EVERY 8 HOURS PRN
Qty: 20 TABLET | Refills: 1 | Status: SHIPPED | OUTPATIENT
Start: 2025-08-11

## 2025-08-11 RX ORDER — CLONAZEPAM 1 MG/1
TABLET ORAL
Qty: 45 TABLET | Refills: 0 | Status: SHIPPED | OUTPATIENT
Start: 2025-08-11 | End: 2025-09-08

## 2025-08-11 RX ORDER — UBROGEPANT 100 MG/1
TABLET ORAL
Qty: 16 TABLET | Refills: 3 | Status: SHIPPED | OUTPATIENT
Start: 2025-08-11

## 2025-08-11 RX ORDER — TOPIRAMATE 200 MG/1
200 TABLET, FILM COATED ORAL 2 TIMES DAILY
Qty: 180 TABLET | Refills: 0 | Status: SHIPPED | OUTPATIENT
Start: 2025-08-11

## (undated) DEVICE — Device

## (undated) DEVICE — MANIFOLD SUCT SMK EVAC SGL PRT DISP NEPTUNE 2

## (undated) DEVICE — COLON KIT WITH 1.1 OZ ORCA HYDRA SEAL 2 GOWN

## (undated) DEVICE — CANNULA NSL AD L7FT DIV O2 CO2 W/ M LUERLOCK TRMPT CONN

## (undated) DEVICE — ADAPTER CLEANING PORPOISE CLEANING

## (undated) DEVICE — ENDO KIT,LOURDES HOSPITAL: Brand: MEDLINE INDUSTRIES, INC.

## (undated) DEVICE — FORCEPS BX 240CM 2.4MM L NDL RAD JAW 4 M00513334

## (undated) DEVICE — SINGLE PORT MANIFOLD: Brand: NEPTUNE 2

## (undated) DEVICE — BITE BLOCK ENDOSCP AD 60 FR W/ ADJ STRP PLAS GRN BLOX

## (undated) DEVICE — CLEANING SPONGE: Brand: KOALA™

## (undated) DEVICE — BRUSH ENDOSCP 2 END CHN HEDGEHOG

## (undated) DEVICE — SUPPLEMENT DIGESTIVE H2O SOL GI-EASE